# Patient Record
Sex: MALE | Race: WHITE | NOT HISPANIC OR LATINO | Employment: OTHER | URBAN - METROPOLITAN AREA
[De-identification: names, ages, dates, MRNs, and addresses within clinical notes are randomized per-mention and may not be internally consistent; named-entity substitution may affect disease eponyms.]

---

## 2017-02-21 DIAGNOSIS — Z09 SURGERY FOLLOW-UP EXAMINATION: ICD-10-CM

## 2017-02-22 RX ORDER — BACLOFEN 10 MG/1
TABLET ORAL
Qty: 270 TABLET | Refills: 1 | Status: SHIPPED | OUTPATIENT
Start: 2017-02-22 | End: 2017-12-06

## 2017-04-18 ENCOUNTER — HOSPITAL ENCOUNTER (OUTPATIENT)
Dept: GENERAL RADIOLOGY | Facility: HOSPITAL | Age: 72
Discharge: HOME | End: 2017-04-18
Attending: NEUROLOGICAL SURGERY | Admitting: NEUROLOGICAL SURGERY

## 2017-04-18 DIAGNOSIS — Z09 SURGERY FOLLOW-UP EXAMINATION: ICD-10-CM

## 2017-04-18 PROCEDURE — 72114 X-RAY EXAM L-S SPINE BENDING: CPT

## 2017-04-20 ENCOUNTER — OFFICE VISIT (OUTPATIENT)
Dept: NEUROSURGERY | Facility: CLINIC | Age: 72
End: 2017-04-20

## 2017-04-20 VITALS
HEIGHT: 72 IN | BODY MASS INDEX: 34.95 KG/M2 | WEIGHT: 258 LBS | DIASTOLIC BLOOD PRESSURE: 83 MMHG | HEART RATE: 68 BPM | SYSTOLIC BLOOD PRESSURE: 143 MMHG

## 2017-04-20 DIAGNOSIS — G89.29 CHRONIC MIDLINE LOW BACK PAIN WITHOUT SCIATICA: Primary | ICD-10-CM

## 2017-04-20 DIAGNOSIS — M54.50 CHRONIC MIDLINE LOW BACK PAIN WITHOUT SCIATICA: Primary | ICD-10-CM

## 2017-04-20 PROCEDURE — 99213 OFFICE O/P EST LOW 20 MIN: CPT | Performed by: NEUROLOGICAL SURGERY

## 2017-04-20 NOTE — PROGRESS NOTES
Subjective   Patient ID: Isaac Suarez is a 71 y.o. male is here today for follow-up. with new XR. He is almost 7 months out from a L4-5 lumbar discectomy and fusion on 9/23/16. He has back pain. It is increased when he ambulates longer distances.    History of Present Illness     This patient returns today.  He is feeling pretty good overall.  He does not have a lot of pain.  He does have some pain in his back when he walks any distance at all.    The following portions of the patient's history were reviewed and updated as appropriate: allergies, current medications, past family history, past medical history, past social history, past surgical history and problem list.    Review of Systems   Respiratory: Negative for chest tightness and shortness of breath.    Cardiovascular: Negative for chest pain.   Musculoskeletal: Positive for back pain.   All other systems reviewed and are negative.      Objective   Physical Exam   Constitutional: He is oriented to person, place, and time. He appears well-developed and well-nourished.   Neurological: He is oriented to person, place, and time.     Neurologic Exam     Mental Status   Oriented to person, place, and time.       Assessment/Plan   Independent Review of Radiographic Studies:      I reviewed his plain films done on April 18 myself.  These show good alignment of the construct at L4 5.  There appears to be good bone formation laterally over the transverse processes but not a lot of bone yet in the disc space.  There is no movement on flexion and extension.    Medical Decision Making:      I told the patient about the imaging.  I told him that from my point of view he should just stick with his exercise program.  He is to call me if any other problems develop.  Otherwise I will check him one more time in about 6 months.    Isaac was seen today for back pain.    Diagnoses and all orders for this visit:    Chronic midline low back pain without sciatica  -     XR Spine  Cervical Complete With Flex Ext; Future    Return in about 6 months (around 10/20/2017).

## 2017-09-20 ENCOUNTER — HOSPITAL ENCOUNTER (OUTPATIENT)
Dept: GENERAL RADIOLOGY | Facility: HOSPITAL | Age: 72
Discharge: HOME | End: 2017-09-20
Attending: NEUROLOGICAL SURGERY | Admitting: NEUROLOGICAL SURGERY

## 2017-09-20 ENCOUNTER — HOSPITAL ENCOUNTER (OUTPATIENT)
Dept: GENERAL RADIOLOGY | Facility: HOSPITAL | Age: 72
Discharge: HOME | End: 2017-09-20
Attending: NEUROLOGICAL SURGERY

## 2017-09-20 DIAGNOSIS — M54.50 CHRONIC MIDLINE LOW BACK PAIN WITHOUT SCIATICA: Primary | ICD-10-CM

## 2017-09-20 DIAGNOSIS — G89.29 CHRONIC MIDLINE LOW BACK PAIN WITHOUT SCIATICA: Primary | ICD-10-CM

## 2017-09-20 DIAGNOSIS — G89.29 CHRONIC MIDLINE LOW BACK PAIN WITHOUT SCIATICA: ICD-10-CM

## 2017-09-20 DIAGNOSIS — M54.50 CHRONIC MIDLINE LOW BACK PAIN WITHOUT SCIATICA: ICD-10-CM

## 2017-09-20 PROCEDURE — 72114 X-RAY EXAM L-S SPINE BENDING: CPT

## 2017-09-28 ENCOUNTER — OFFICE VISIT (OUTPATIENT)
Dept: NEUROSURGERY | Facility: CLINIC | Age: 72
End: 2017-09-28

## 2017-09-28 VITALS
BODY MASS INDEX: 36.57 KG/M2 | WEIGHT: 270 LBS | SYSTOLIC BLOOD PRESSURE: 116 MMHG | HEART RATE: 66 BPM | HEIGHT: 72 IN | DIASTOLIC BLOOD PRESSURE: 77 MMHG

## 2017-09-28 DIAGNOSIS — M54.50 CHRONIC MIDLINE LOW BACK PAIN WITHOUT SCIATICA: Primary | ICD-10-CM

## 2017-09-28 DIAGNOSIS — G89.29 CHRONIC MIDLINE LOW BACK PAIN WITHOUT SCIATICA: Primary | ICD-10-CM

## 2017-09-28 PROCEDURE — 99213 OFFICE O/P EST LOW 20 MIN: CPT | Performed by: NEUROLOGICAL SURGERY

## 2017-09-28 RX ORDER — ALPRAZOLAM 0.5 MG/1
TABLET ORAL
Qty: 3 TABLET | Refills: 0 | OUTPATIENT
Start: 2017-09-28 | End: 2017-11-21

## 2017-09-28 NOTE — PROGRESS NOTES
Subjective   Patient ID: Isaac Suarez is a 72 y.o. male is here today for follow-up with new XR. He is a year out from a L4-5 lumbar discectomy and fusion on 9/23/16. He had increased back pain with long distance walks. He also has bilateral hip pain that will wake him up while he is sleeping.    History of Present Illness    This patient is doing well overall.  He doesn't have any specific complaints other than if he has to walk more than about 50 yards he has to take a cane because he gets pain in his back.  He has pain in his hips it wakes him up at night and he has seen an orthopedic surgeon who says his hips are okay.    The following portions of the patient's history were reviewed and updated as appropriate: allergies, current medications, past family history, past medical history, past social history, past surgical history and problem list.    Review of Systems   Respiratory: Negative for chest tightness and shortness of breath.    Cardiovascular: Negative for chest pain.   Musculoskeletal: Positive for arthralgias (Bilateral hip pain) and back pain.   Neurological: Positive for numbness (Left big toe).   Psychiatric/Behavioral: Positive for sleep disturbance.   All other systems reviewed and are negative.      Objective   Physical Exam   Constitutional: He is oriented to person, place, and time. He appears well-developed and well-nourished.   Neurological: He is oriented to person, place, and time.     Neurologic Exam     Mental Status   Oriented to person, place, and time.       Assessment/Plan   Independent Review of Radiographic Studies:      I reviewed x-rays done of his lumbar spine last week.  These look okay with good bone formation laterally and also what appears to be good bone formation in the disc space.    Medical Decision Making:      I told the patient that I think a lot of the trouble he has walking is simply a result of damage done to the nerves prior to decompressing them.  Nonetheless I  think we should probably check a new MRI just to be sure that there isn't something else going on.  I will see him back once that has been done.    Isaac was seen today for back pain and hip pain.    Diagnoses and all orders for this visit:    Chronic midline low back pain without sciatica  -     MRI Lumbar Spine With & Without Contrast; Future      Return for After radiology test.

## 2017-09-28 NOTE — PATIENT INSTRUCTIONS

## 2017-10-03 ENCOUNTER — HOSPITAL ENCOUNTER (OUTPATIENT)
Dept: MRI IMAGING | Facility: HOSPITAL | Age: 72
Discharge: HOME | End: 2017-10-03
Attending: NEUROLOGICAL SURGERY

## 2017-10-09 ENCOUNTER — HOSPITAL ENCOUNTER (OUTPATIENT)
Dept: MRI IMAGING | Facility: HOSPITAL | Age: 72
Discharge: HOME | End: 2017-10-09
Attending: NEUROLOGICAL SURGERY | Admitting: NEUROLOGICAL SURGERY

## 2017-10-09 DIAGNOSIS — M54.50 CHRONIC MIDLINE LOW BACK PAIN WITHOUT SCIATICA: ICD-10-CM

## 2017-10-09 DIAGNOSIS — G89.29 CHRONIC MIDLINE LOW BACK PAIN WITHOUT SCIATICA: ICD-10-CM

## 2017-10-09 LAB — CREAT BLDA-MCNC: 1.4 MG/DL (ref 0.6–1.3)

## 2017-10-09 PROCEDURE — 72158 MRI LUMBAR SPINE W/O & W/DYE: CPT

## 2017-10-09 PROCEDURE — 82565 ASSAY OF CREATININE: CPT

## 2017-10-09 PROCEDURE — A9577 INJ MULTIHANCE: HCPCS | Performed by: NEUROLOGICAL SURGERY

## 2017-10-09 PROCEDURE — 0 GADOBENATE DIMEGLUMINE 529 MG/ML SOLUTION: Performed by: NEUROLOGICAL SURGERY

## 2017-10-09 RX ADMIN — GADOBENATE DIMEGLUMINE 20 ML: 529 INJECTION, SOLUTION INTRAVENOUS at 09:45

## 2017-11-21 ENCOUNTER — OFFICE VISIT (OUTPATIENT)
Dept: NEUROSURGERY | Facility: CLINIC | Age: 72
End: 2017-11-21

## 2017-11-21 VITALS — HEART RATE: 72 BPM | DIASTOLIC BLOOD PRESSURE: 77 MMHG | SYSTOLIC BLOOD PRESSURE: 129 MMHG

## 2017-11-21 DIAGNOSIS — M46.1 BILATERAL SACROILIITIS (HCC): Primary | ICD-10-CM

## 2017-11-21 PROCEDURE — 99213 OFFICE O/P EST LOW 20 MIN: CPT | Performed by: NEUROLOGICAL SURGERY

## 2017-11-21 NOTE — PROGRESS NOTES
Subjective   Patient ID: Isaac Suarez is a 72 y.o. male is here today for follow-up with a new Lumbar MRI ordered for increased back pain that radiates into his bilateral hips.    History of Present Illness    This patient continues with pain primarily in his hips.  He said it is particularly bad at night when he is lying on his hips.  After a couple of hours he has to turned over to the other hip.    The following portions of the patient's history were reviewed and updated as appropriate: allergies, current medications, past family history, past medical history, past social history, past surgical history and problem list.    Review of Systems   Respiratory: Negative for chest tightness and shortness of breath.    Cardiovascular: Negative for chest pain.   Musculoskeletal: Positive for arthralgias ( bilateral hip pain) and back pain.   Neurological: Positive for numbness.   All other systems reviewed and are negative.      Objective   Physical Exam   Constitutional: He is oriented to person, place, and time. He appears well-developed and well-nourished.   Neurological: He is oriented to person, place, and time.     Neurologic Exam     Mental Status   Oriented to person, place, and time.       Assessment/Plan   Independent Review of Radiographic Studies:      I again reviewed his plain films which were done on the 20th subsequent time her.  This shows good bone formation laterally at L4 5.  I reviewed his MRI which was done on 9 October.  This looks okay at T12-L1 and L1 2.  L2-3 is also fairly open.  L3 4 is a little bit downward but not much.  L4 5 is totally open and L5-S1 shows just some degenerative change.    Medical Decision Making:      I told the patient some of the pain could be coming from his sacroiliac joint.  I suggested that we go ahead and get someone to do an injection in that area. I will have him call me and let me know how they work once they have been done.  I also told him he has to lose  olayinka Gray was seen today for back pain.    Diagnoses and all orders for this visit:    Bilateral sacroiliitis  -     SI Joint Injection    Return for Recheck and call after treatment or consultation.

## 2017-12-06 ENCOUNTER — ANESTHESIA (OUTPATIENT)
Dept: PAIN MEDICINE | Facility: HOSPITAL | Age: 72
End: 2017-12-06

## 2017-12-06 ENCOUNTER — HOSPITAL ENCOUNTER (OUTPATIENT)
Dept: GENERAL RADIOLOGY | Facility: HOSPITAL | Age: 72
Discharge: HOME | End: 2017-12-06

## 2017-12-06 ENCOUNTER — HOSPITAL ENCOUNTER (OUTPATIENT)
Dept: PAIN MEDICINE | Facility: HOSPITAL | Age: 72
Discharge: HOME | End: 2017-12-06
Attending: NEUROLOGICAL SURGERY | Admitting: NEUROLOGICAL SURGERY

## 2017-12-06 ENCOUNTER — ANESTHESIA EVENT (OUTPATIENT)
Dept: PAIN MEDICINE | Facility: HOSPITAL | Age: 72
End: 2017-12-06

## 2017-12-06 VITALS
SYSTOLIC BLOOD PRESSURE: 140 MMHG | RESPIRATION RATE: 16 BRPM | WEIGHT: 275 LBS | TEMPERATURE: 98.1 F | OXYGEN SATURATION: 96 % | HEIGHT: 72 IN | BODY MASS INDEX: 37.25 KG/M2 | DIASTOLIC BLOOD PRESSURE: 87 MMHG | HEART RATE: 74 BPM

## 2017-12-06 DIAGNOSIS — R52 PAIN: ICD-10-CM

## 2017-12-06 DIAGNOSIS — M46.1 BILATERAL SACROILIITIS (HCC): ICD-10-CM

## 2017-12-06 PROCEDURE — 25010000002 MIDAZOLAM PER 1 MG: Performed by: ANESTHESIOLOGY

## 2017-12-06 PROCEDURE — 25010000002 FENTANYL CITRATE (PF) 100 MCG/2ML SOLUTION: Performed by: ANESTHESIOLOGY

## 2017-12-06 PROCEDURE — 25010000002 METHYLPREDNISOLONE PER 80 MG: Performed by: ANESTHESIOLOGY

## 2017-12-06 PROCEDURE — C1755 CATHETER, INTRASPINAL: HCPCS

## 2017-12-06 RX ORDER — SODIUM CHLORIDE 0.9 % (FLUSH) 0.9 %
1-10 SYRINGE (ML) INJECTION AS NEEDED
Status: DISCONTINUED | OUTPATIENT
Start: 2017-12-06 | End: 2017-12-07 | Stop reason: HOSPADM

## 2017-12-06 RX ORDER — LIDOCAINE HYDROCHLORIDE 10 MG/ML
0.5 INJECTION, SOLUTION INFILTRATION; PERINEURAL ONCE AS NEEDED
Status: CANCELLED | OUTPATIENT
Start: 2017-12-06

## 2017-12-06 RX ORDER — FENTANYL CITRATE 50 UG/ML
50 INJECTION, SOLUTION INTRAMUSCULAR; INTRAVENOUS AS NEEDED
Status: DISCONTINUED | OUTPATIENT
Start: 2017-12-06 | End: 2017-12-07 | Stop reason: HOSPADM

## 2017-12-06 RX ORDER — METHYLPREDNISOLONE ACETATE 80 MG/ML
80 INJECTION, SUSPENSION INTRA-ARTICULAR; INTRALESIONAL; INTRAMUSCULAR; SOFT TISSUE ONCE
Status: COMPLETED | OUTPATIENT
Start: 2017-12-06 | End: 2017-12-06

## 2017-12-06 RX ORDER — MIDAZOLAM HYDROCHLORIDE 1 MG/ML
1 INJECTION INTRAMUSCULAR; INTRAVENOUS AS NEEDED
Status: DISCONTINUED | OUTPATIENT
Start: 2017-12-06 | End: 2017-12-07 | Stop reason: HOSPADM

## 2017-12-06 RX ORDER — LIDOCAINE HYDROCHLORIDE 10 MG/ML
1 INJECTION, SOLUTION INFILTRATION; PERINEURAL ONCE AS NEEDED
Status: DISCONTINUED | OUTPATIENT
Start: 2017-12-06 | End: 2017-12-07 | Stop reason: HOSPADM

## 2017-12-06 RX ORDER — BUPIVACAINE HYDROCHLORIDE 5 MG/ML
10 INJECTION, SOLUTION EPIDURAL; INTRACAUDAL ONCE
Status: COMPLETED | OUTPATIENT
Start: 2017-12-06 | End: 2017-12-06

## 2017-12-06 RX ADMIN — Medication 1 MG: at 13:35

## 2017-12-06 RX ADMIN — FENTANYL CITRATE 100 MCG: 50 INJECTION INTRAMUSCULAR; INTRAVENOUS at 13:36

## 2017-12-06 RX ADMIN — METHYLPREDNISOLONE ACETATE 80 MG: 80 INJECTION, SUSPENSION INTRA-ARTICULAR; INTRALESIONAL; INTRAMUSCULAR; SOFT TISSUE at 13:43

## 2017-12-06 RX ADMIN — BUPIVACAINE HYDROCHLORIDE 10 ML: 5 INJECTION, SOLUTION EPIDURAL; INTRACAUDAL; PERINEURAL at 13:42

## 2017-12-06 NOTE — ANESTHESIA PROCEDURE NOTES
PAIN SI joint injection    Patient location during procedure: Pain Clinic  Start time: 12/6/2017 1:35 PM  Stop time: 12/6/2017 1:48 PM    Reason for block: procedure for pain  Performed by  Anesthesiologist: RAGHAV STEPHEN  Preanesthetic Checklist  Completed: patient identified, site marked, surgical consent, pre-op evaluation, timeout performed, IV checked, risks and benefits discussed and monitors and equipment checked  Prep:  Patient position: supine  Sterile barriers:mask, gloves and cap  Prep: ChloraPrep  Patient monitoring: blood pressure monitoring, continuous pulse oximetry and EKG  Procedure:  Sedation:yes  Guidance:fluoroscopy  Contrast Medium:no  Location:Bilateral  Needle Gauge:22 G  Medications:  Analgesia: 40 mg each side.  Comment:4 cc 0.5% bupivacaine each side    Post Assessment  Patient Tolerance:comfortable throughout block  Complications:no

## 2017-12-06 NOTE — H&P
[facility]    History and Physical    Patient Name: Isaac Suarez  :  1945  MRN:  [id number]  Date of Admission: 2017    Subjective     Patient is a 72 y.o. male presents with chief complaint of chronic, moderate, severe buttock pain.  Onset of symptoms was gradual starting several years ago.  Symptoms are associated/aggravated by activity or sleeping on side. Symptoms improve with TENS unit and OTC meds. He had a lumbar decompression one year ago and repeat workup didn't show any significant problem. Dr Frias suggested that it may be sacroiliitis and requested SI jt injections.    The following portions of the patients history were reviewed and updated as appropriate: current medications, allergies, past medical history, past surgical history, past family history, past social history and problem list                Objective     Past Medical History:   Past Medical History:   Diagnosis Date   • Arthritis    • Cancer     PROSTATE   • Chronic back pain     when walking and lying down   • History of prostate cancer     RADIATION TX   • Hyperlipidemia    • Hypertension    • Low back pain    • Lumbar stenosis      Past Surgical History:   Past Surgical History:   Procedure Laterality Date   • CATARACT EXTRACTION Right    • COLONOSCOPY N/A 8/15/2016    Procedure: COLONOSCOPY INTO CECUM WITH COLD SNARE POLYPECTOMY;  Surgeon: Loc Lee MD;  Location: [facility] ENDOSCOPY;  Service:    • HERNIA REPAIR      bilat inguinal   • LUMBAR DISCECTOMY FUSION INSTRUMENTATION N/A 2016    Procedure: L 4-5 LUMBAR DISCECTOMY FUSION WITH INSTRUMENTATION;  Surgeon: Michael Frias MD;  Location: [facility] OR;  Service:    • ROTATOR CUFF REPAIR Bilateral     metal in both   • TOTAL SHOULDER ARTHROPLASTY Bilateral      Family History:   Family History   Problem Relation Age of Onset   • Cancer Father      BRAIN     Social History:   Social History   Substance Use Topics   • Smoking status: Former Smoker      Packs/day: 2.00     Years: 12.00     Types: Cigarettes     Quit date: 1976   • Smokeless tobacco: Never Used   • Alcohol use No      Comment: SOCIALLY       Vital Signs Range for the last 24 hours  Temperature:     Temp Source:     BP:     Pulse:     Respirations:     SPO2:     O2 Amount (l/min):     O2 Devices     Weight:           --------------------------------------------------------------------------------    Current Outpatient Prescriptions   Medication Sig Dispense Refill   • furosemide (LASIX) 20 MG tablet Take 20 mg by mouth every morning.     • lisinopril (PRINIVIL,ZESTRIL) 10 MG tablet Take 10 mg by mouth every morning.     • aspirin 81 MG EC tablet Take 81 mg by mouth every morning. STOPPED 9/12/16     • bisacodyl (DULCOLAX) 10 MG suppository Insert 1 suppository into the rectum daily. 1 suppository 0     Current Facility-Administered Medications   Medication Dose Route Frequency Provider Last Rate Last Dose   • sodium chloride 0.9 % flush 1-10 mL  1-10 mL Intravenous PRN Oz Gomez MD           --------------------------------------------------------------------------------  Assessment/Plan      Anesthesia Evaluation            Airway   Mallampati: II  Dental - normal exam     Pulmonary     breath sounds clear to auscultation  Cardiovascular     Rate: normal        Neuro/Psych- neuro exam normal  GI/Hepatic/Renal/Endo      Musculoskeletal     (-) straight leg test  Abdominal    Substance History      OB/GYN          Other                                   Diagnosis and Plan    Treatment Plan  ASA 3      Procedures: Nerve block type: Sacro iliac Joint injections., With fluoroscopy,       Anesthetic plan and risks discussed with patient.          Diagnosis     * Bilateral sacroiliitis [M46.1]

## 2017-12-07 ENCOUNTER — TELEPHONE (OUTPATIENT)
Dept: NEUROSURGERY | Facility: CLINIC | Age: 72
End: 2017-12-07

## 2017-12-07 NOTE — TELEPHONE ENCOUNTER
Patient is aware of what Dr. Frias said. He will call us if he needs anything further.      ----- Message from Michael Frias MD sent at 12/7/2017  5:29 PM EST -----  Regarding: RE: SI INJECTION  That is great.  That means that is the source of the problem.  If the pain returns he should follow-up with pain management for further injections.  Please let him know.  ----- Message -----     From: Nika Giles MA     Sent: 12/7/2017   3:52 PM       To: Michael Frias MD  Subject: SI INJECTION                                     Patient called to report that he had his first SI injection yesterday and he has received a significant amount of relief. He stated that you wanted to know how the first one went.

## 2017-12-14 ENCOUNTER — TRANSCRIBE ORDERS (OUTPATIENT)
Dept: PAIN MEDICINE | Facility: HOSPITAL | Age: 72
End: 2017-12-14

## 2017-12-14 DIAGNOSIS — M46.1 BILATERAL SACROILIITIS (HCC): Primary | ICD-10-CM

## 2018-01-03 ENCOUNTER — HOSPITAL ENCOUNTER (OUTPATIENT)
Dept: PAIN MEDICINE | Facility: HOSPITAL | Age: 73
Discharge: HOME | End: 2018-01-03
Admitting: NEUROLOGICAL SURGERY

## 2018-01-03 ENCOUNTER — HOSPITAL ENCOUNTER (OUTPATIENT)
Dept: GENERAL RADIOLOGY | Facility: HOSPITAL | Age: 73
Discharge: HOME | End: 2018-01-03

## 2018-01-03 ENCOUNTER — ANESTHESIA EVENT (OUTPATIENT)
Dept: PAIN MEDICINE | Facility: HOSPITAL | Age: 73
End: 2018-01-03

## 2018-01-03 ENCOUNTER — ANESTHESIA (OUTPATIENT)
Dept: PAIN MEDICINE | Facility: HOSPITAL | Age: 73
End: 2018-01-03

## 2018-01-03 VITALS
HEART RATE: 74 BPM | OXYGEN SATURATION: 96 % | RESPIRATION RATE: 16 BRPM | BODY MASS INDEX: 37.24 KG/M2 | WEIGHT: 274.91 LBS | DIASTOLIC BLOOD PRESSURE: 76 MMHG | SYSTOLIC BLOOD PRESSURE: 128 MMHG | HEIGHT: 72 IN

## 2018-01-03 DIAGNOSIS — M46.1 BILATERAL SACROILIITIS (HCC): ICD-10-CM

## 2018-01-03 DIAGNOSIS — R52 PAIN: ICD-10-CM

## 2018-01-03 PROCEDURE — C1755 CATHETER, INTRASPINAL: HCPCS

## 2018-01-03 PROCEDURE — 25010000002 METHYLPREDNISOLONE PER 80 MG: Performed by: ANESTHESIOLOGY

## 2018-01-03 RX ORDER — LIDOCAINE HYDROCHLORIDE 10 MG/ML
1 INJECTION, SOLUTION INFILTRATION; PERINEURAL ONCE AS NEEDED
Status: DISCONTINUED | OUTPATIENT
Start: 2018-01-03 | End: 2018-01-04 | Stop reason: HOSPADM

## 2018-01-03 RX ORDER — FENTANYL CITRATE 50 UG/ML
50 INJECTION, SOLUTION INTRAMUSCULAR; INTRAVENOUS AS NEEDED
Status: DISCONTINUED | OUTPATIENT
Start: 2018-01-03 | End: 2018-01-04 | Stop reason: HOSPADM

## 2018-01-03 RX ORDER — SODIUM CHLORIDE 0.9 % (FLUSH) 0.9 %
1-10 SYRINGE (ML) INJECTION AS NEEDED
Status: DISCONTINUED | OUTPATIENT
Start: 2018-01-03 | End: 2018-01-04 | Stop reason: HOSPADM

## 2018-01-03 RX ORDER — METHYLPREDNISOLONE ACETATE 80 MG/ML
80 INJECTION, SUSPENSION INTRA-ARTICULAR; INTRALESIONAL; INTRAMUSCULAR; SOFT TISSUE ONCE
Status: COMPLETED | OUTPATIENT
Start: 2018-01-03 | End: 2018-01-03

## 2018-01-03 RX ORDER — BUPIVACAINE HYDROCHLORIDE 2.5 MG/ML
10 INJECTION, SOLUTION EPIDURAL; INFILTRATION; INTRACAUDAL ONCE
Status: COMPLETED | OUTPATIENT
Start: 2018-01-03 | End: 2018-01-03

## 2018-01-03 RX ORDER — MIDAZOLAM HYDROCHLORIDE 1 MG/ML
1 INJECTION INTRAMUSCULAR; INTRAVENOUS AS NEEDED
Status: DISCONTINUED | OUTPATIENT
Start: 2018-01-03 | End: 2018-01-04 | Stop reason: HOSPADM

## 2018-01-03 RX ADMIN — METHYLPREDNISOLONE ACETATE 80 MG: 80 INJECTION, SUSPENSION INTRA-ARTICULAR; INTRALESIONAL; INTRAMUSCULAR; SOFT TISSUE at 10:54

## 2018-01-03 RX ADMIN — BUPIVACAINE HYDROCHLORIDE 10 ML: 2.5 INJECTION, SOLUTION EPIDURAL; INFILTRATION; INTRACAUDAL at 10:54

## 2018-01-03 NOTE — H&P (VIEW-ONLY)
Psychiatric    History and Physical    Patient Name: Isaac Suarez  :  1945  MRN:  [id number]  Date of Admission: 2017    Subjective     Patient is a 72 y.o. male presents with chief complaint of chronic, moderate, severe buttock pain.  Onset of symptoms was gradual starting several years ago.  Symptoms are associated/aggravated by activity or sleeping on side. Symptoms improve with TENS unit and OTC meds. He had a lumbar decompression one year ago and repeat workup didn't show any significant problem. Dr Frias suggested that it may be sacroiliitis and requested SI jt injections.    The following portions of the patients history were reviewed and updated as appropriate: current medications, allergies, past medical history, past surgical history, past family history, past social history and problem list                Objective     Past Medical History:   Past Medical History:   Diagnosis Date   • Arthritis    • Cancer     PROSTATE   • Chronic back pain     when walking and lying down   • History of prostate cancer     RADIATION TX   • Hyperlipidemia    • Hypertension    • Low back pain    • Lumbar stenosis      Past Surgical History:   Past Surgical History:   Procedure Laterality Date   • CATARACT EXTRACTION Right    • COLONOSCOPY N/A 8/15/2016    Procedure: COLONOSCOPY INTO CECUM WITH COLD SNARE POLYPECTOMY;  Surgeon: Loc Lee MD;  Location: [facility] ENDOSCOPY;  Service:    • HERNIA REPAIR      bilat inguinal   • LUMBAR DISCECTOMY FUSION INSTRUMENTATION N/A 2016    Procedure: L 4-5 LUMBAR DISCECTOMY FUSION WITH INSTRUMENTATION;  Surgeon: Michael Frias MD;  Location: [facility] OR;  Service:    • ROTATOR CUFF REPAIR Bilateral     metal in both   • TOTAL SHOULDER ARTHROPLASTY Bilateral      Family History:   Family History   Problem Relation Age of Onset   • Cancer Father      BRAIN     Social History:   Social History   Substance Use Topics   • Smoking status: Former Smoker      Packs/day: 2.00     Years: 12.00     Types: Cigarettes     Quit date: 1976   • Smokeless tobacco: Never Used   • Alcohol use No      Comment: SOCIALLY       Vital Signs Range for the last 24 hours  Temperature:     Temp Source:     BP:     Pulse:     Respirations:     SPO2:     O2 Amount (l/min):     O2 Devices     Weight:           --------------------------------------------------------------------------------    Current Outpatient Prescriptions   Medication Sig Dispense Refill   • furosemide (LASIX) 20 MG tablet Take 20 mg by mouth every morning.     • lisinopril (PRINIVIL,ZESTRIL) 10 MG tablet Take 10 mg by mouth every morning.     • aspirin 81 MG EC tablet Take 81 mg by mouth every morning. STOPPED 9/12/16     • bisacodyl (DULCOLAX) 10 MG suppository Insert 1 suppository into the rectum daily. 1 suppository 0     Current Facility-Administered Medications   Medication Dose Route Frequency Provider Last Rate Last Dose   • sodium chloride 0.9 % flush 1-10 mL  1-10 mL Intravenous PRN Oz Gomez MD           --------------------------------------------------------------------------------  Assessment/Plan      Anesthesia Evaluation            Airway   Mallampati: II  Dental - normal exam     Pulmonary     breath sounds clear to auscultation  Cardiovascular     Rate: normal        Neuro/Psych- neuro exam normal  GI/Hepatic/Renal/Endo      Musculoskeletal     (-) straight leg test  Abdominal    Substance History      OB/GYN          Other                                   Diagnosis and Plan    Treatment Plan  ASA 3      Procedures: Nerve block type: Sacro iliac Joint injections., With fluoroscopy,       Anesthetic plan and risks discussed with patient.          Diagnosis     * Bilateral sacroiliitis [M46.1]

## 2018-01-03 NOTE — ANESTHESIA PROCEDURE NOTES
PAIN SI joint injection    Patient location during procedure: Pain Clinic  Start time: 1/3/2018 10:44 AM  Stop time: 1/3/2018 10:58 AM    Reason for block: procedure for pain  Performed by  Anesthesiologist: BILLY ORELLANA  Preanesthetic Checklist  Completed: patient identified, site marked, surgical consent, pre-op evaluation, timeout performed, IV checked, risks and benefits discussed and monitors and equipment checked  Prep:  Patient position: supine  Sterile barriers:mask, gloves and cap  Prep: ChloraPrep  Patient monitoring: blood pressure monitoring, continuous pulse oximetry and EKG  Procedure:  Sedation:no  Guidance:fluoroscopy  Contrast Medium:no  Location:Bilateral  Needle Type:Tuohy  Needle gauge: 20.  Aspiration:negative  Medications:  Depomedrol:40 mg (in each joint space)  Comment:Bupiv 0.25% 3cc in each joint space    Post Assessment  Injection Assessment: negative  Patient Tolerance:comfortable throughout block  Complications:no

## 2018-01-03 NOTE — DISCHARGE INSTRUCTIONS
Lumbar Epidural Steroid Injection Instructions  Plan includes:  1.  Sacro Iliac Joint  steroid injections, up to 3, spaced 1-2 weeks apart.  If pain control is acceptable after 1 or 2 injections, it was discussed with the patient that they may return for the subsequent injections if and when their pain returns.  The risks were discussed with the patient including failure of relief, worsening pain, Headache (post dural puncture headache), bleeding (epidural hematoma) and infection (epidural abscess or skin infection).  2.  Physical therapy exercises at home as prescribed by physical therapy or from the pain clinic handout (given to the patient).  Continuation of these exercises every day, or multiple times per week, even when the patient has good pain relief, was stressed to the patient as a preventative measure to decrease the frequency and severity of future pain episodes.  3.  Continue pain medicines as already prescribed.  If patient not currently taking any, it is recommended to begin Acetaminophen 1000 mg po q 8 hours.  If other medicines containing Acetaminophen are currently prescribed, maintain daily dose at 3000 mg.    4.  If they can tolerate NSAIDS, it is recommended to take Ibuprofen 600 mg po q 6 hours for 7 days during pain exacerbations.  Alternatively, they may substitute an NSAID of their choice (e.g. Aleve).  This may be taken at the same time as Acetaminophen.  5.  Heat and ice to the affected area as tolerated for pain control.  It was discussed that heating pads can cause burns.  6.  Daily low impact exercise such as walking or water exercise was recommended to maintain overall health and aid in weight control.   7.  Follow up as needed for subsequent injections.  8.  Patient was counseled to abstain from tobacco products.

## 2018-01-03 NOTE — INTERVAL H&P NOTE
[facility]  H&P reviewed. No changes since last visit.  Patient states   % improvement for over a week after the first procedure/injection.  The pain has since recurred but is not as bad as the first time    Diagnosis     * Bilateral sacroiliitis [M46.1]      Airway assessed since last visit. Airway class equals: 2.

## 2019-07-09 ENCOUNTER — TELEPHONE (OUTPATIENT)
Dept: FAMILY MEDICINE CLINIC | Facility: CLINIC | Age: 74
End: 2019-07-09

## 2019-07-09 DIAGNOSIS — R60.9 EDEMA, UNSPECIFIED TYPE: ICD-10-CM

## 2019-07-09 DIAGNOSIS — I10 ESSENTIAL HYPERTENSION: Primary | ICD-10-CM

## 2019-07-09 RX ORDER — FUROSEMIDE 20 MG/1
20 TABLET ORAL EVERY MORNING
Qty: 30 TABLET | Refills: 3 | Status: SHIPPED | OUTPATIENT
Start: 2019-07-09 | End: 2019-08-20 | Stop reason: SDUPTHER

## 2019-07-09 RX ORDER — LISINOPRIL 10 MG/1
10 TABLET ORAL EVERY MORNING
Qty: 30 TABLET | Refills: 2 | Status: SHIPPED | OUTPATIENT
Start: 2019-07-09 | End: 2019-08-20 | Stop reason: SDUPTHER

## 2019-07-09 NOTE — TELEPHONE ENCOUNTER
PATIENT IS UPSET & WANTS A CALL BACK FROM [facility] BEFORE THE END OF THE DAY AS WELL AS HIS LISINOPRIL 10MG & FUROSEMIDE 20MG CALLED INTO [facility] [phone]. HE STATES THAT HE HAS BEEN WITHOUT HIS MEDICATION FOR 6 DAYS & THAT THE PHARM HAS HAD TROUBLE FINDING US.

## 2019-08-20 ENCOUNTER — OFFICE VISIT (OUTPATIENT)
Dept: FAMILY MEDICINE CLINIC | Facility: CLINIC | Age: 74
End: 2019-08-20

## 2019-08-20 VITALS
BODY MASS INDEX: 31.77 KG/M2 | HEART RATE: 77 BPM | SYSTOLIC BLOOD PRESSURE: 118 MMHG | TEMPERATURE: 98.5 F | WEIGHT: 234.6 LBS | DIASTOLIC BLOOD PRESSURE: 74 MMHG | OXYGEN SATURATION: 97 % | HEIGHT: 72 IN

## 2019-08-20 DIAGNOSIS — R74.01 ELEVATED TRANSAMINASE LEVEL: ICD-10-CM

## 2019-08-20 DIAGNOSIS — I10 ESSENTIAL HYPERTENSION: ICD-10-CM

## 2019-08-20 DIAGNOSIS — R25.2 LEG CRAMPS: Primary | ICD-10-CM

## 2019-08-20 DIAGNOSIS — E78.5 HYPERLIPIDEMIA, UNSPECIFIED HYPERLIPIDEMIA TYPE: ICD-10-CM

## 2019-08-20 DIAGNOSIS — R60.9 EDEMA, UNSPECIFIED TYPE: ICD-10-CM

## 2019-08-20 PROCEDURE — 99214 OFFICE O/P EST MOD 30 MIN: CPT | Performed by: INTERNAL MEDICINE

## 2019-08-20 RX ORDER — FUROSEMIDE 20 MG/1
20 TABLET ORAL EVERY MORNING
Qty: 90 TABLET | Refills: 1 | Status: SHIPPED | OUTPATIENT
Start: 2019-08-20 | End: 2019-11-26 | Stop reason: SDUPTHER

## 2019-08-20 RX ORDER — LISINOPRIL 10 MG/1
10 TABLET ORAL EVERY MORNING
Qty: 90 TABLET | Refills: 1 | Status: SHIPPED | OUTPATIENT
Start: 2019-08-20 | End: 2020-04-29

## 2019-08-20 NOTE — PROGRESS NOTES
Subjective   Isaac Suarez is a 73 y.o. male.     Chief Complaint   Patient presents with   • Muscle Pain     both feet   • Med Refill   • Hyperlipidemia   • Hypertension       History of Present Illness   Follow-up for cholesterol.  Currently has been feeling well without any myalgias, muscle aches, weakness, numbness, chest pain, short of breath or other issues.  Currently is adherent with medication regimen and denies medication side effects. Is due for lab follow-up.  Follow-up for hypertension.  Currently has been feeling well and asymptomatic without any headaches,vision changes, cough, chest pain, shortness of breath, swelling, focal neurologic deficit, memory loss or syncope.  Has been taking the medications regularly and adherent with the regimen, Denies medication side effects and no significant interval events.  Home BP has been doing good but cannot remember the numbers.  Has been following a diet and loosing weight with his wife.  Patient has been having cramps and pain in the feet/ ankles and distal legs mainly at night in bed but sometimes during the day.  Has been worsening over the last several months.  Tried pickle juice, OTC creams.      The following portions of the patient's history were reviewed and updated as appropriate: allergies, current medications, past family history, past medical history, past social history, past surgical history and problem list.    Past Medical History:   Diagnosis Date   • Abnormal MRI, lumbar spine    • Allergic rhinitis    • Arthritis    • Bilateral hearing loss    • Borderline blood pressure    • BPH (benign prostatic hyperplasia)    • Cancer (CMS/HCC)     PROSTATE   • Chronic back pain     when walking and lying down   • Chronic kidney disease (CKD), stage I    • Cough    • Edema    • Elevated transaminase level    • Erectile dysfunction    • External hemorrhoids    • GERD (gastroesophageal reflux disease)    • High cholesterol    • History of colon polyps    •  History of depression    • History of prostate cancer     RADIATION [place]   • Hyperlipidemia    • Hypertension    • Leg cramps    • Low back pain    • Lumbar stenosis    • Muscle spasm    • Prostate cancer (CMS/HCC)    • Renal cyst    • Statin intolerance    • Vitamin D deficiency        Past Surgical History:   Procedure Laterality Date   • CATARACT EXTRACTION Right    • COLONOSCOPY N/A 8/15/2016    Procedure: COLONOSCOPY INTO CECUM WITH COLD SNARE POLYPECTOMY;  Surgeon: Loc Lee MD;  Location: [facility] ENDOSCOPY;  Service:    • HERNIA REPAIR      bilat inguinal   • INSERTION PROSTATE RADIATION SEED     • LUMBAR DISCECTOMY FUSION INSTRUMENTATION N/A 2016    Procedure: L 4-5 LUMBAR DISCECTOMY FUSION WITH INSTRUMENTATION;  Surgeon: Michael Frias MD;  Location: [facility] MAIN OR;  Service:    • PROSTATE BIOPSY      Needle biopsy   • ROTATOR CUFF REPAIR Bilateral     metal in both   • TOTAL SHOULDER ARTHROPLASTY Bilateral    • VASECTOMY         Family History   Problem Relation Age of Onset   • Cancer Father         BRAIN   • Esophageal cancer Other        Social History     Socioeconomic History   • Marital status:      Spouse name: Not on file   • Number of children: Not on file   • Years of education: 12TH GRADE   • Highest education level: Not on file   Occupational History   • Occupation: RETIRED   Tobacco Use   • Smoking status: Former Smoker     Packs/day: 2.00     Years: 12.00     Pack years: 24.00     Types: Cigarettes     Last attempt to quit:      Years since quittin.6   • Smokeless tobacco: Never Used   Substance and Sexual Activity   • Alcohol use: Yes     Comment: SOCIALLY-Drinks 7 or less drinks per week   • Drug use: No   • Sexual activity: Defer       Review of Systems   Constitutional: Negative for activity change, appetite change, fatigue, fever, unexpected weight gain and unexpected weight loss.   HENT: Negative for nosebleeds, rhinorrhea, trouble swallowing and voice  change.    Eyes: Negative for visual disturbance.   Respiratory: Negative for cough, chest tightness, shortness of breath and wheezing.    Cardiovascular: Negative for chest pain, palpitations and leg swelling.   Gastrointestinal: Negative for abdominal pain, blood in stool, constipation, diarrhea, nausea, vomiting, GERD and indigestion.   Genitourinary: Negative for dysuria, frequency and hematuria.   Musculoskeletal: Negative for arthralgias, back pain and myalgias.        Cramps in distal LE   Skin: Negative for rash and bruise.   Neurological: Negative for dizziness, tremors, weakness, light-headedness, numbness, headache and memory problem.   Hematological: Negative for adenopathy. Does not bruise/bleed easily.   Psychiatric/Behavioral: Negative for sleep disturbance and depressed mood. The patient is not nervous/anxious.        Objective   Vitals:    08/20/19 1716   BP: 118/74   Pulse: 77   Temp: 98.5 °F (36.9 °C)   SpO2: 97%     Body mass index is 31.82 kg/m².  Physical Exam   Constitutional: He is oriented to person, place, and time. He appears well-developed and well-nourished. No distress.   HENT:   Head: Normocephalic and atraumatic.   Right Ear: External ear normal.   Left Ear: External ear normal.   Nose: Nose normal.   Mouth/Throat: Oropharynx is clear and moist.   Eyes: Conjunctivae and EOM are normal. Pupils are equal, round, and reactive to light.   Neck: Normal range of motion. Neck supple. No tracheal deviation present. No thyromegaly present.   Cardiovascular: Normal rate, regular rhythm, normal heart sounds and intact distal pulses. Exam reveals no gallop and no friction rub.   No murmur heard.  Pulmonary/Chest: Effort normal and breath sounds normal. No respiratory distress.   Abdominal: Soft. Bowel sounds are normal. He exhibits no mass. There is no tenderness. There is no guarding.   Musculoskeletal: Normal range of motion. He exhibits no edema.   Lymphadenopathy:     He has no cervical  adenopathy.   Neurological: He is alert and oriented to person, place, and time. He displays normal reflexes. He exhibits normal muscle tone.   Skin: Skin is warm and dry. Capillary refill takes less than 2 seconds. No rash noted. He is not diaphoretic.   Psychiatric: He has a normal mood and affect. His behavior is normal. Judgment and thought content normal.   Nursing note and vitals reviewed.      Recent Results (from the past 2016 hour(s))   Comprehensive Metabolic Panel    Collection Time: 08/21/19  8:27 AM   Result Value Ref Range    Glucose 111 (H) 65 - 99 mg/dL    BUN 37 (H) 8 - 23 mg/dL    Creatinine 1.38 (H) 0.76 - 1.27 mg/dL    eGFR Non African Am 51 (L) >60 mL/min/1.73    eGFR African Am 61 >60 mL/min/1.73    BUN/Creatinine Ratio 26.8 (H) 7.0 - 25.0    Sodium 137 136 - 145 mmol/L    Potassium 4.9 3.5 - 5.2 mmol/L    Chloride 101 98 - 107 mmol/L    Total CO2 24.1 22.0 - 29.0 mmol/L    Calcium 9.3 8.6 - 10.5 mg/dL    Total Protein 6.6 6.0 - 8.5 g/dL    Albumin 4.20 3.50 - 5.20 g/dL    Globulin 2.4 gm/dL    A/G Ratio 1.8 g/dL    Total Bilirubin 0.4 0.2 - 1.2 mg/dL    Alkaline Phosphatase 60 39 - 117 U/L    AST (SGOT) 21 1 - 40 U/L    ALT (SGPT) 20 1 - 41 U/L     Assessment/Plan   Isaac was seen today for muscle pain, med refill, hyperlipidemia and hypertension.    Diagnoses and all orders for this visit:    Leg cramps  -     Comprehensive Metabolic Panel    Edema, unspecified type  -     furosemide (LASIX) 20 MG tablet; Take 1 tablet by mouth Every Morning.    Essential hypertension  -     lisinopril (PRINIVIL,ZESTRIL) 10 MG tablet; Take 1 tablet by mouth Every Morning.    Hyperlipidemia, unspecified hyperlipidemia type    Elevated transaminase level  -     Comprehensive Metabolic Panel

## 2019-08-21 ENCOUNTER — TELEPHONE (OUTPATIENT)
Dept: FAMILY MEDICINE CLINIC | Facility: CLINIC | Age: 74
End: 2019-08-21

## 2019-08-21 NOTE — TELEPHONE ENCOUNTER
Patient was seen on 8-20-19 and is checking on muscle relaxer to be called in to [facility]? Thinks it is Baclofen

## 2019-08-22 DIAGNOSIS — R25.2 LEG CRAMPS: Primary | ICD-10-CM

## 2019-08-22 LAB
ALBUMIN SERPL-MCNC: 4.2 G/DL (ref 3.5–5.2)
ALBUMIN/GLOB SERPL: 1.8 G/DL
ALP SERPL-CCNC: 60 U/L (ref 39–117)
ALT SERPL-CCNC: 20 U/L (ref 1–41)
AST SERPL-CCNC: 21 U/L (ref 1–40)
BILIRUB SERPL-MCNC: 0.4 MG/DL (ref 0.2–1.2)
BUN SERPL-MCNC: 37 MG/DL (ref 8–23)
BUN/CREAT SERPL: 26.8 (ref 7–25)
CALCIUM SERPL-MCNC: 9.3 MG/DL (ref 8.6–10.5)
CHLORIDE SERPL-SCNC: 101 MMOL/L (ref 98–107)
CO2 SERPL-SCNC: 24.1 MMOL/L (ref 22–29)
CREAT SERPL-MCNC: 1.38 MG/DL (ref 0.76–1.27)
GLOBULIN SER CALC-MCNC: 2.4 GM/DL
GLUCOSE SERPL-MCNC: 111 MG/DL (ref 65–99)
POTASSIUM SERPL-SCNC: 4.9 MMOL/L (ref 3.5–5.2)
PROT SERPL-MCNC: 6.6 G/DL (ref 6–8.5)
SODIUM SERPL-SCNC: 137 MMOL/L (ref 136–145)

## 2019-08-22 RX ORDER — BACLOFEN 10 MG/1
10 TABLET ORAL 2 TIMES DAILY PRN
Qty: 30 TABLET | Refills: 0 | Status: SHIPPED | OUTPATIENT
Start: 2019-08-22 | End: 2019-11-26

## 2019-09-23 ENCOUNTER — OFFICE VISIT (OUTPATIENT)
Dept: FAMILY MEDICINE CLINIC | Facility: CLINIC | Age: 74
End: 2019-09-23

## 2019-09-23 VITALS
WEIGHT: 224.2 LBS | SYSTOLIC BLOOD PRESSURE: 110 MMHG | TEMPERATURE: 97.5 F | OXYGEN SATURATION: 98 % | HEART RATE: 95 BPM | RESPIRATION RATE: 17 BRPM | BODY MASS INDEX: 30.41 KG/M2 | DIASTOLIC BLOOD PRESSURE: 80 MMHG

## 2019-09-23 DIAGNOSIS — J18.9 PNEUMONIA DUE TO INFECTIOUS ORGANISM, UNSPECIFIED LATERALITY, UNSPECIFIED PART OF LUNG: Primary | ICD-10-CM

## 2019-09-23 PROCEDURE — 99213 OFFICE O/P EST LOW 20 MIN: CPT | Performed by: FAMILY MEDICINE

## 2019-09-23 PROCEDURE — 71046 X-RAY EXAM CHEST 2 VIEWS: CPT | Performed by: FAMILY MEDICINE

## 2019-09-23 RX ORDER — DOXYCYCLINE HYCLATE 100 MG
100 TABLET ORAL 2 TIMES DAILY
Qty: 20 TABLET | Refills: 0 | Status: SHIPPED | OUTPATIENT
Start: 2019-09-23 | End: 2019-10-03

## 2019-09-23 RX ORDER — GUAIFENESIN 200 MG/10ML
200 LIQUID ORAL 3 TIMES DAILY PRN
Qty: 236 ML | Refills: 1 | Status: SHIPPED | OUTPATIENT
Start: 2019-09-23 | End: 2019-11-26

## 2019-09-23 NOTE — PROGRESS NOTES
Subjective   Isaac Suarez is a 74 y.o. male.     Chief Complaint   Patient presents with   • Pneumonia     went to the ER , [facility] on [address] , on 09/14/2019, got pnuemonia, was sent ATB and something for cough, but he is not any better    • chest congestion     all congestion on his chest        Cough since 9/17/19, got a Zpack and tessalon, clear sputum and no fever, non smoker, went to [place] no wheezing, + sneezing    Cough   This is a new problem. The current episode started 1 to 4 weeks ago. The problem has been unchanged. The problem occurs constantly. The cough is productive of sputum. Associated symptoms include nasal congestion and postnasal drip. Pertinent negatives include no chest pain, fever, hemoptysis or shortness of breath. Nothing aggravates the symptoms. He has tried nothing for the symptoms. The treatment provided no relief. His past medical history is significant for pneumonia. There is no history of asthma or COPD.          The following portions of the patient's history were reviewed and updated as appropriate: allergies, current medications, past family history, past medical history, past social history, past surgical history and problem list.    Past Medical History:   Diagnosis Date   • Abnormal MRI, lumbar spine    • Allergic rhinitis    • Arthritis    • Bilateral hearing loss    • Borderline blood pressure    • BPH (benign prostatic hyperplasia)    • Cancer (CMS/HCC)     PROSTATE   • Chronic back pain     when walking and lying down   • Chronic kidney disease (CKD), stage I    • Cough    • Edema    • Elevated transaminase level    • Erectile dysfunction    • External hemorrhoids    • GERD (gastroesophageal reflux disease)    • High cholesterol    • History of colon polyps    • History of depression    • History of prostate cancer     RADIATION [place]   • Hyperlipidemia    • Hypertension    • Leg cramps    • Low back pain    • Lumbar stenosis    • Muscle spasm    •  Prostate cancer (CMS/HCC)    • Renal cyst    • Statin intolerance    • Vitamin D deficiency        Past Surgical History:   Procedure Laterality Date   • CATARACT EXTRACTION Right    • COLONOSCOPY N/A 8/15/2016    Procedure: COLONOSCOPY INTO CECUM WITH COLD SNARE POLYPECTOMY;  Surgeon: Loc Lee MD;  Location: [facility] ENDOSCOPY;  Service:    • HERNIA REPAIR      bilat inguinal   • INSERTION PROSTATE RADIATION SEED     • LUMBAR DISCECTOMY FUSION INSTRUMENTATION N/A 2016    Procedure: L 4-5 LUMBAR DISCECTOMY FUSION WITH INSTRUMENTATION;  Surgeon: Michael Frias MD;  Location: [facility] MAIN OR;  Service:    • PROSTATE BIOPSY      Needle biopsy   • ROTATOR CUFF REPAIR Bilateral     metal in both   • TOTAL SHOULDER ARTHROPLASTY Bilateral    • VASECTOMY         Family History   Problem Relation Age of Onset   • Cancer Father         BRAIN   • Esophageal cancer Other        Social History     Socioeconomic History   • Marital status:      Spouse name: Not on file   • Number of children: Not on file   • Years of education: 12TH GRADE   • Highest education level: Not on file   Occupational History   • Occupation: RETIRED   Tobacco Use   • Smoking status: Former Smoker     Packs/day: 2.00     Years: 12.00     Pack years: 24.00     Types: Cigarettes     Last attempt to quit:      Years since quittin.7   • Smokeless tobacco: Never Used   Substance and Sexual Activity   • Alcohol use: Yes     Comment: SOCIALLY-Drinks 7 or less drinks per week   • Drug use: No   • Sexual activity: Defer       Review of Systems   Constitutional: Negative for fever.   HENT: Positive for postnasal drip.    Respiratory: Positive for cough. Negative for hemoptysis and shortness of breath.    Cardiovascular: Negative for chest pain.       Objective   Vitals:    19 1456   BP: 110/80   Pulse: 95   Resp: 17   Temp: 97.5 °F (36.4 °C)   SpO2: 98%     Body mass index is 30.41 kg/m².  Physical Exam   Constitutional: He  appears well-developed and well-nourished.   HENT:   Head: Normocephalic and atraumatic.   Right Ear: External ear normal.   Left Ear: External ear normal.   Mouth/Throat: Oropharynx is clear and moist.   Cardiovascular: Normal rate, regular rhythm and normal heart sounds. Exam reveals no gallop and no friction rub.   No murmur heard.  Pulmonary/Chest: Effort normal. No stridor. No respiratory distress. He has no wheezes. He has rales. He exhibits no tenderness.   + crackles Bilateral   Musculoskeletal: Normal range of motion.   Neurological: He is alert.   Skin: Skin is warm.   Nursing note and vitals reviewed.        Assessment/Plan   Isaac was seen today for pneumonia and chest congestion.    Diagnoses and all orders for this visit:    Pneumonia due to infectious organism, unspecified laterality, unspecified part of lung  -     XR Chest 2 View  -     guaifenesin (ROBITUSSIN) 100 MG/5ML liquid; Take 10 mL by mouth 3 (Three) Times a Day As Needed for Cough.  -     doxycycline (VIBRAMYICN) 100 MG tablet; Take 1 tablet by mouth 2 (Two) Times a Day for 10 days.      Patient agreed to go to the ER if no improvement

## 2019-10-01 ENCOUNTER — TELEPHONE (OUTPATIENT)
Dept: FAMILY MEDICINE CLINIC | Facility: CLINIC | Age: 74
End: 2019-10-01

## 2019-10-07 DIAGNOSIS — I10 ESSENTIAL HYPERTENSION: ICD-10-CM

## 2019-10-07 DIAGNOSIS — R60.9 EDEMA, UNSPECIFIED TYPE: ICD-10-CM

## 2019-10-07 RX ORDER — LISINOPRIL 10 MG/1
10 TABLET ORAL EVERY MORNING
Qty: 30 TABLET | Refills: 0 | Status: SHIPPED | OUTPATIENT
Start: 2019-10-07 | End: 2019-11-26

## 2019-10-07 RX ORDER — FUROSEMIDE 20 MG/1
20 TABLET ORAL EVERY MORNING
Qty: 30 TABLET | Refills: 0 | Status: SHIPPED | OUTPATIENT
Start: 2019-10-07 | End: 2019-11-26

## 2019-10-14 DIAGNOSIS — R60.9 EDEMA, UNSPECIFIED TYPE: ICD-10-CM

## 2019-10-14 RX ORDER — FUROSEMIDE 20 MG/1
20 TABLET ORAL EVERY MORNING
Qty: 90 TABLET | Refills: 0 | OUTPATIENT
Start: 2019-10-14

## 2019-11-26 ENCOUNTER — OFFICE VISIT (OUTPATIENT)
Dept: FAMILY MEDICINE CLINIC | Facility: CLINIC | Age: 74
End: 2019-11-26

## 2019-11-26 VITALS
HEART RATE: 56 BPM | WEIGHT: 238 LBS | TEMPERATURE: 97.6 F | HEIGHT: 72 IN | BODY MASS INDEX: 32.23 KG/M2 | DIASTOLIC BLOOD PRESSURE: 80 MMHG | SYSTOLIC BLOOD PRESSURE: 138 MMHG | OXYGEN SATURATION: 97 %

## 2019-11-26 DIAGNOSIS — E55.9 VITAMIN D DEFICIENCY: ICD-10-CM

## 2019-11-26 DIAGNOSIS — M62.838 MUSCLE SPASM: ICD-10-CM

## 2019-11-26 DIAGNOSIS — R25.2 LEG CRAMPS: ICD-10-CM

## 2019-11-26 DIAGNOSIS — E66.9 CLASS 1 OBESITY WITHOUT SERIOUS COMORBIDITY WITH BODY MASS INDEX (BMI) OF 32.0 TO 32.9 IN ADULT, UNSPECIFIED OBESITY TYPE: ICD-10-CM

## 2019-11-26 DIAGNOSIS — R60.9 EDEMA, UNSPECIFIED TYPE: ICD-10-CM

## 2019-11-26 DIAGNOSIS — Z00.00 MEDICARE ANNUAL WELLNESS VISIT, SUBSEQUENT: Primary | ICD-10-CM

## 2019-11-26 DIAGNOSIS — Z11.59 ENCOUNTER FOR HEPATITIS C SCREENING TEST FOR LOW RISK PATIENT: ICD-10-CM

## 2019-11-26 PROCEDURE — 99213 OFFICE O/P EST LOW 20 MIN: CPT | Performed by: INTERNAL MEDICINE

## 2019-11-26 PROCEDURE — G0439 PPPS, SUBSEQ VISIT: HCPCS | Performed by: INTERNAL MEDICINE

## 2019-11-26 RX ORDER — ROPINIROLE 0.25 MG/1
0.25 TABLET, FILM COATED ORAL NIGHTLY
Qty: 30 TABLET | Refills: 3 | Status: SHIPPED | OUTPATIENT
Start: 2019-11-26 | End: 2020-03-30

## 2019-11-26 RX ORDER — FUROSEMIDE 20 MG/1
20 TABLET ORAL EVERY MORNING
Qty: 90 TABLET | Refills: 1 | Status: SHIPPED | OUTPATIENT
Start: 2019-11-26 | End: 2020-05-26 | Stop reason: SDUPTHER

## 2019-11-26 NOTE — PATIENT INSTRUCTIONS
Exercising to Lose Weight  Exercise is structured, repetitive physical activity to improve fitness and health. Getting regular exercise is important for everyone. It is especially important if you are overweight. Being overweight increases your risk of heart disease, stroke, diabetes, high blood pressure, and several types of cancer. Reducing your calorie intake and exercising can help you lose weight.  Exercise is usually categorized as moderate or vigorous intensity. To lose weight, most people need to do a certain amount of moderate-intensity or vigorous-intensity exercise each week.  Moderate-intensity exercise    Moderate-intensity exercise is any activity that gets you moving enough to burn at least three times more energy (calories) than if you were sitting.  Examples of moderate exercise include:  · Walking a mile in 15 minutes.  · Doing light yard work.  · Biking at an easy pace.  Most people should get at least 150 minutes (2 hours and 30 minutes) a week of moderate-intensity exercise to maintain their body weight.  Vigorous-intensity exercise  Vigorous-intensity exercise is any activity that gets you moving enough to burn at least six times more calories than if you were sitting. When you exercise at this intensity, you should be working hard enough that you are not able to carry on a conversation.  Examples of vigorous exercise include:  · Running.  · Playing a team sport, such as football, basketball, and soccer.  · Jumping rope.  Most people should get at least 75 minutes (1 hour and 15 minutes) a week of vigorous-intensity exercise to maintain their body weight.  How can exercise affect me?  When you exercise enough to burn more calories than you eat, you lose weight. Exercise also reduces body fat and builds muscle. The more muscle you have, the more calories you burn. Exercise also:  · Improves mood.  · Reduces stress and tension.  · Improves your overall fitness, flexibility, and  endurance.  · Increases bone strength.  The amount of exercise you need to lose weight depends on:  · Your age.  · The type of exercise.  · Any health conditions you have.  · Your overall physical ability.  Talk to your health care provider about how much exercise you need and what types of activities are safe for you.  What actions can I take to lose weight?  Nutrition    · Make changes to your diet as told by your health care provider or diet and nutrition specialist (dietitian). This may include:  ? Eating fewer calories.  ? Eating more protein.  ? Eating less unhealthy fats.  ? Eating a diet that includes fresh fruits and vegetables, whole grains, low-fat dairy products, and lean protein.  ? Avoiding foods with added fat, salt, and sugar.  · Drink plenty of water while you exercise to prevent dehydration or heat stroke.  Activity  · Choose an activity that you enjoy and set realistic goals. Your health care provider can help you make an exercise plan that works for you.  · Exercise at a moderate or vigorous intensity most days of the week.  ? The intensity of exercise may vary from person to person. You can tell how intense a workout is for you by paying attention to your breathing and heartbeat. Most people will notice their breathing and heartbeat get faster with more intense exercise.  · Do resistance training twice each week, such as:  ? Push-ups.  ? Sit-ups.  ? Lifting weights.  ? Using resistance bands.  · Getting short amounts of exercise can be just as helpful as long structured periods of exercise. If you have trouble finding time to exercise, try to include exercise in your daily routine.  ? Get up, stretch, and walk around every 30 minutes throughout the day.  ? Go for a walk during your lunch break.  ? Park your car farther away from your destination.  ? If you take public transportation, get off one stop early and walk the rest of the way.  ? Make phone calls while standing up and walking  around.  ? Take the stairs instead of elevators or escalators.  · Wear comfortable clothes and shoes with good support.  · Do not exercise so much that you hurt yourself, feel dizzy, or get very short of breath.  Where to find more information  · [place] Department of Health and Human Services: www.hhs.gov  · Centers for Disease Control and Prevention (CDC): www.cdc.gov  Contact a health care provider:  · Before starting a new exercise program.  · If you have questions or concerns about your weight.  · If you have a medical problem that keeps you from exercising.  Get help right away if you have any of the following while exercising:  · Injury.  · Dizziness.  · Difficulty breathing or shortness of breath that does not go away when you stop exercising.  · Chest pain.  · Rapid heartbeat.  Summary  · Being overweight increases your risk of heart disease, stroke, diabetes, high blood pressure, and several types of cancer.  · Losing weight happens when you burn more calories than you eat.  · Reducing the amount of calories you eat in addition to getting regular moderate or vigorous exercise each week helps you lose weight.  This information is not intended to replace advice given to you by your health care provider. Make sure you discuss any questions you have with your health care provider.  Document Released: 01/20/2012 Document Revised: 12/31/2018 Document Reviewed: 12/31/2018  [employer] Patient Education © 2019 [employer]      Calorie Counting for Weight Loss  Calories are units of energy. Your body needs a certain amount of calories from food to keep you going throughout the day. When you eat more calories than your body needs, your body stores the extra calories as fat. When you eat fewer calories than your body needs, your body burns fat to get the energy it needs.  Calorie counting means keeping track of how many calories you eat and drink each day. Calorie counting can be helpful if you need to lose  weight. If you make sure to eat fewer calories than your body needs, you should lose weight. Ask your health care provider what a healthy weight is for you.  For calorie counting to work, you will need to eat the right number of calories in a day in order to lose a healthy amount of weight per week. A dietitian can help you determine how many calories you need in a day and will give you suggestions on how to reach your calorie goal.  · A healthy amount of weight to lose per week is usually 1-2 lb (0.5-0.9 kg). This usually means that your daily calorie intake should be reduced by 500-750 calories.  · Eating 1,200 - 1,500 calories per day can help most women lose weight.  · Eating 1,500 - 1,800 calories per day can help most men lose weight.  What is my plan?  My goal is to have __________ calories per day.  If I have this many calories per day, I should lose around __________ pounds per week.  What do I need to know about calorie counting?  In order to meet your daily calorie goal, you will need to:  · Find out how many calories are in each food you would like to eat. Try to do this before you eat.  · Decide how much of the food you plan to eat.  · Write down what you ate and how many calories it had. Doing this is called keeping a food log.  To successfully lose weight, it is important to balance calorie counting with a healthy lifestyle that includes regular activity. Aim for 150 minutes of moderate exercise (such as walking) or 75 minutes of vigorous exercise (such as running) each week.  Where do I find calorie information?    The number of calories in a food can be found on a Nutrition Facts label. If a food does not have a Nutrition Facts label, try to look up the calories online or ask your dietitian for help.  Remember that calories are listed per serving. If you choose to have more than one serving of a food, you will have to multiply the calories per serving by the amount of servings you plan to eat. For  "example, the label on a package of bread might say that a serving size is 1 slice and that there are 90 calories in a serving. If you eat 1 slice, you will have eaten 90 calories. If you eat 2 slices, you will have eaten 180 calories.  How do I keep a food log?  Immediately after each meal, record the following information in your food log:  · What you ate. Don't forget to include toppings, sauces, and other extras on the food.  · How much you ate. This can be measured in cups, ounces, or number of items.  · How many calories each food and drink had.  · The total number of calories in the meal.  Keep your food log near you, such as in a small notebook in your pocket, or use a mobile [place] or website. Some programs will calculate calories for you and show you how many calories you have left for the day to meet your goal.  What are some calorie counting tips?    · Use your calories on foods and drinks that will fill you up and not leave you hungry:  ? Some examples of foods that fill you up are nuts and nut butters, vegetables, lean proteins, and high-fiber foods like whole grains. High-fiber foods are foods with more than 5 g fiber per serving.  ? Drinks such as sodas, specialty coffee drinks, alcohol, and juices have a lot of calories, yet do not fill you up.  · Eat nutritious foods and avoid empty calories. Empty calories are calories you get from foods or beverages that do not have many vitamins or protein, such as candy, sweets, and soda. It is better to have a nutritious high-calorie food (such as an avocado) than a food with few nutrients (such as a bag of chips).  · Know how many calories are in the foods you eat most often. This will help you calculate calorie counts faster.  · Pay attention to calories in drinks. Low-calorie drinks include water and unsweetened drinks.  · Pay attention to nutrition labels for \"low fat\" or \"fat free\" foods. These foods sometimes have the same amount of calories or more calories " than the full fat versions. They also often have added sugar, starch, or salt, to make up for flavor that was removed with the fat.  · Find a way of tracking calories that works for you. Get creative. Try different apps or programs if writing down calories does not work for you.  What are some portion control tips?  · Know how many calories are in a serving. This will help you know how many servings of a certain food you can have.  · Use a measuring cup to measure serving sizes. You could also try weighing out portions on a kitchen scale. With time, you will be able to estimate serving sizes for some foods.  · Take some time to put servings of different foods on your favorite plates, bowls, and cups so you know what a serving looks like.  · Try not to eat straight from a bag or box. Doing this can lead to overeating. Put the amount you would like to eat in a cup or on a plate to make sure you are eating the right portion.  · Use smaller plates, glasses, and bowls to prevent overeating.  · Try not to multitask (for example, watch TV or use your computer) while eating. If it is time to eat, sit down at a table and enjoy your food. This will help you to know when you are full. It will also help you to be aware of what you are eating and how much you are eating.  What are tips for following this plan?  Reading food labels  · Check the calorie count compared to the serving size. The serving size may be smaller than what you are used to eating.  · Check the source of the calories. Make sure the food you are eating is high in vitamins and protein and low in saturated and trans fats.  Shopping  · Read nutrition labels while you shop. This will help you make healthy decisions before you decide to purchase your food.  · Make a grocery list and stick to it.  Cooking  · Try to cook your favorite foods in a healthier way. For example, try baking instead of frying.  · Use low-fat dairy products.  Meal planning  · Use more fruits  "and vegetables. Half of your plate should be fruits and vegetables.  · Include lean proteins like poultry and fish.  How do I count calories when eating out?  · Ask for smaller portion sizes.  · Consider sharing an entree and sides instead of getting your own entree.  · If you get your own entree, eat only half. Ask for a box at the beginning of your meal and put the rest of your entree in it so you are not tempted to eat it.  · If calories are listed on the menu, choose the lower calorie options.  · Choose dishes that include vegetables, fruits, whole grains, low-fat dairy products, and lean protein.  · Choose items that are boiled, broiled, grilled, or steamed. Stay away from items that are buttered, battered, fried, or served with cream sauce. Items labeled \"crispy\" are usually fried, unless stated otherwise.  · Choose water, low-fat milk, unsweetened iced tea, or other drinks without added sugar. If you want an alcoholic beverage, choose a lower calorie option such as a glass of wine or light beer.  · Ask for dressings, sauces, and syrups on the side. These are usually high in calories, so you should limit the amount you eat.  · If you want a salad, choose a garden salad and ask for grilled meats. Avoid extra toppings like viveros, cheese, or fried items. Ask for the dressing on the side, or ask for olive oil and vinegar or lemon to use as dressing.  · Estimate how many servings of a food you are given. For example, a serving of cooked rice is ½ cup or about the size of half a baseball. Knowing serving sizes will help you be aware of how much food you are eating at restaurants. The list below tells you how big or small some common portion sizes are based on everyday objects:  ? 1 oz--4 stacked dice.  ? 3 oz--1 deck of cards.  ? 1 tsp--1 die.  ? 1 Tbsp--½ a ping-pong ball.  ? 2 Tbsp--1 ping-pong ball.  ? ½ cup--½ baseball.  ? 1 cup--1 baseball.  Summary  · Calorie counting means keeping track of how many calories " you eat and drink each day. If you eat fewer calories than your body needs, you should lose weight.  · A healthy amount of weight to lose per week is usually 1-2 lb (0.5-0.9 kg). This usually means reducing your daily calorie intake by 500-750 calories.  · The number of calories in a food can be found on a Nutrition Facts label. If a food does not have a Nutrition Facts label, try to look up the calories online or ask your dietitian for help.  · Use your calories on foods and drinks that will fill you up, and not on foods and drinks that will leave you hungry.  · Use smaller plates, glasses, and bowls to prevent overeating.  This information is not intended to replace advice given to you by your health care provider. Make sure you discuss any questions you have with your health care provider.  Document Released: 2006 Document Revised: 2019 Document Reviewed: 2017  [employer] Interactive Patient Education © 2019 [employer]    Medicare Wellness  Personal Prevention Plan of Service     Date of Office Visit:  2019  Encounter Provider:  Guillermo Moran MD  Place of Service:  [facility]  Patient Name: Isaac Suarez  :  1945    As part of the Medicare Wellness portion of your visit today, we are providing you with this personalized preventive plan of services (PPPS). This plan is based upon recommendations of the [place] Preventive Services Task Force (USPSTF) and the Advisory Committee on Immunization Practices (ACIP).    This lists the preventive care services that should be considered, and provides dates of when you are due. Items listed as completed are up-to-date and do not require any further intervention.    Health Maintenance   Topic Date Due   • TDAP/TD VACCINES (1 - Tdap) 1965   • ZOSTER VACCINE (1 of 2) 1996   • HEPATITIS C SCREENING  2017   • MEDICARE ANNUAL WELLNESS  2019   • AAA SCREEN (ONE-TIME)  2019   •  COLONOSCOPY  08/15/2026   • INFLUENZA VACCINE  Completed   • PNEUMOCOCCAL VACCINES (65+ LOW/MEDIUM RISK)  Completed   • LIPID PANEL  Discontinued       Orders Placed This Encounter   Procedures   • Hepatitis C Antibody   • Vitamin D 25 Hydroxy   • Vitamin B12     Standing Status:   Future     Standing Expiration Date:   11/26/2020   • Folate     Standing Status:   Future     Standing Expiration Date:   11/26/2020   • CK     Standing Status:   Future     Standing Expiration Date:   11/26/2020   • CBC & Differential     Order Specific Question:   Manual Differential     Answer:   No       Return in about 6 months (around 5/26/2020) for Next scheduled follow up.

## 2019-11-26 NOTE — PROGRESS NOTES
Subsequent Medicare Wellness Visit   The ABC's of the Annual Wellness Visit    Chief Complaint   Patient presents with   • Spasms     feet and ankles    • Annual Exam       HPI:  Isaac Suarez, -1945, is a 74 y.o. male who presents for a Subsequent Medicare Wellness Visit.  Here for medicare subsequent visit.  Complains of many months of leg cramps increasing in frequency and duration.  Had CMP that was overall normal other than mild renal insufficiency.  Toes sometimes curl.  Cramps are never in the thigh.  Cannot take statins and is not taking any.  Tried stopping the lisinopril and the furosemide with no relief and he restarted them.  No rashes or sores.  Has 0-10 episodes a night that wake him from sleeping.  When sit still feels he needs to move the feet all the time.    Recent Hospitalizations:  No hospitalization(s) within the last year..    Current Medical Providers:  Patient Care Team:  Guillermo Moran MD as PCP - General (Internal Medicine)  Tyrel Molina MD (Inactive) as Consulting Physician (Pain Medicine)  Edward Starkey MD as Surgeon (Orthopedic Surgery)   Marisa - urology  Casseneli - spine surgery    Health Habits and Functional and Cognitive Screening and Depression Screening:  Functional & Cognitive Status 2019   Do you have difficulty preparing food and eating? No   Do you have difficulty bathing yourself, getting dressed or grooming yourself? No   Do you have difficulty using the toilet? No   Do you have difficulty moving around from place to place? No   Do you have trouble with steps or getting out of a bed or a chair? No   Current Diet Low Carb Diet   Exercise (times per week) 0 times per week   Do you need help using the phone?  No   Are you deaf or do you have serious difficulty hearing?  Yes   Do you need help with transportation? No   Do you need help shopping? No   Do you need help preparing meals?  No   Do you need help with housework?  No   Do you  need help with laundry? No   Do you need help taking your medications? No   Do you need help managing money? No   Do you ever drive or ride in a car without wearing a seat belt? No   Have you felt unusual stress, anger or loneliness in the last month? No   Who do you live with? Spouse   If you need help, do you have trouble finding someone available to you? No   Have you been bothered in the last four weeks by sexual problems? No   Do you have difficulty concentrating, remembering or making decisions? No       Compared to one year ago, the patient feels his physical health is the same and his mental health is the same.    Depression Screen:  PHQ-2/PHQ-9 Depression Screening 11/26/2019   Little interest or pleasure in doing things 0   Feeling down, depressed, or hopeless 0   Trouble falling or staying asleep, or sleeping too much 0   Feeling tired or having little energy 0   Poor appetite or overeating 0   Feeling bad about yourself - or that you are a failure or have let yourself or your family down 0   Trouble concentrating on things, such as reading the newspaper or watching television 0   Moving or speaking so slowly that other people could have noticed. Or the opposite - being so fidgety or restless that you have been moving around a lot more than usual 0   Thoughts that you would be better off dead, or of hurting yourself in some way 0   Total Score 0         Past Medical/Family/Social History:  The following portions of the patient's history were reviewed and updated as appropriate: allergies, current medications, past family history, past medical history, past social history, past surgical history and problem list.    Allergies   Allergen Reactions   • Crestor [Rosuvastatin] Myalgia   • Penicillins Hives     HIVES   • Zetia [Ezetimibe] Myalgia   • Zocor [Simvastatin] Myalgia         Current Outpatient Medications:   •  aspirin 81 MG EC tablet, Take 81 mg by mouth every morning. STOPPED 9/12/16, Disp: , Rfl:    •  furosemide (LASIX) 20 MG tablet, Take 1 tablet by mouth Every Morning., Disp: 90 tablet, Rfl: 1  •  lisinopril (PRINIVIL,ZESTRIL) 10 MG tablet, Take 1 tablet by mouth Every Morning., Disp: 90 tablet, Rfl: 1  •  rOPINIRole (REQUIP) 0.25 MG tablet, Take 1 tablet by mouth Every Night. Take 1 hour before bedtime., Disp: 30 tablet, Rfl: 3    Aspirin use counseling: Taking ASA appropriately as indicated    Current medication list contains no high risk medications.  No harmful drug interactions have been identified.     Family History   Problem Relation Age of Onset   • Cancer Father         BRAIN   • Esophageal cancer Other        Social History     Tobacco Use   • Smoking status: Former Smoker     Packs/day: 2.00     Years: 12.00     Pack years: 24.00     Types: Cigarettes     Last attempt to quit:      Years since quittin.9   • Smokeless tobacco: Never Used   Substance Use Topics   • Alcohol use: Yes     Comment: SOCIALLY-Drinks 7 or less drinks per week       Past Surgical History:   Procedure Laterality Date   • CATARACT EXTRACTION Right    • COLONOSCOPY N/A 8/15/2016    Procedure: COLONOSCOPY INTO CECUM WITH COLD SNARE POLYPECTOMY;  Surgeon: Loc Lee MD;  Location: [facility] ENDOSCOPY;  Service:    • HERNIA REPAIR      bilat inguinal   • INSERTION PROSTATE RADIATION SEED     • LUMBAR DISCECTOMY FUSION INSTRUMENTATION N/A 2016    Procedure: L 4-5 LUMBAR DISCECTOMY FUSION WITH INSTRUMENTATION;  Surgeon: Michael Frias MD;  Location: [facility] OR;  Service:    • PROSTATE BIOPSY      Needle biopsy   • ROTATOR CUFF REPAIR Bilateral     metal in both   • TOTAL SHOULDER ARTHROPLASTY Bilateral    • VASECTOMY         Patient Active Problem List   Diagnosis   • Chronic midline low back pain without sciatica   • Bilateral sacroiliitis (CMS/HCC)   • Hypertension   • Hyperlipidemia   • Leg cramps   • Elevated transaminase level   • Medicare annual wellness visit, subsequent   • Encounter for hepatitis  "C screening test for low risk patient   • Vitamin D deficiency   • Muscle spasm       Review of Systems   Constitutional: Negative for activity change, appetite change and unexpected weight change.   HENT: Negative for dental problem, tinnitus, trouble swallowing and voice change.    Eyes: Negative for visual disturbance.   Respiratory: Negative for cough, chest tightness and shortness of breath.    Cardiovascular: Negative for chest pain, palpitations and leg swelling.   Gastrointestinal: Negative for abdominal pain, blood in stool, constipation, diarrhea, nausea and vomiting.   Endocrine: Negative for polyuria.   Genitourinary: Negative for dysuria and hematuria.   Musculoskeletal: Negative for arthralgias and back pain.        Muscle cramps.   Skin: Negative for rash.   Neurological: Negative for dizziness, tremors, weakness, light-headedness, numbness and headaches.   Hematological: Negative for adenopathy. Does not bruise/bleed easily.   Psychiatric/Behavioral: Negative for confusion and sleep disturbance. The patient is not nervous/anxious.        Objective     Vitals:    11/26/19 0823   BP: 138/80   BP Location: Right arm   Patient Position: Sitting   Cuff Size: Adult   Pulse: 56   Temp: 97.6 °F (36.4 °C)   SpO2: 97%   Weight: 108 kg (238 lb)   Height: 182.9 cm (72.01\")       Patient's Body mass index is 32.27 kg/m². BMI is above normal parameters. Recommendations include: exercise counseling and nutrition counseling.      No exam data present    The patient has no evidence of cognitve impairment.     Physical Exam   Constitutional: He is oriented to person, place, and time. He appears well-developed and well-nourished. No distress.   HENT:   Head: Normocephalic and atraumatic.   Right Ear: External ear normal.   Left Ear: External ear normal.   Nose: Nose normal.   Mouth/Throat: Oropharynx is clear and moist.   Eyes: Conjunctivae and EOM are normal. Pupils are equal, round, and reactive to light.   Neck: " Normal range of motion. Neck supple. No tracheal deviation present. No thyromegaly present.   Cardiovascular: Normal rate, regular rhythm, normal heart sounds and intact distal pulses. Exam reveals no gallop and no friction rub.   No murmur heard.  Pulmonary/Chest: Effort normal and breath sounds normal. No respiratory distress.   Abdominal: Soft. Bowel sounds are normal. He exhibits no mass. There is no tenderness. There is no guarding.   Musculoskeletal: Normal range of motion. He exhibits no edema.   Lymphadenopathy:     He has no cervical adenopathy.   Neurological: He is alert and oriented to person, place, and time. He displays normal reflexes. He exhibits normal muscle tone.   Skin: Skin is warm and dry. Capillary refill takes less than 2 seconds. No rash noted. He is not diaphoretic.   Psychiatric: He has a normal mood and affect. His behavior is normal. Judgment and thought content normal.   Nursing note and vitals reviewed.      Recent Lab Results:  Lab Results   Component Value Date     (H) 08/21/2019          Assessment/Plan   Age-appropriate Screening Schedule:  Refer to the list below for future screening recommendations based on patient's age, sex and/or medical conditions.      Health Maintenance   Topic Date Due   • TDAP/TD VACCINES (1 - Tdap) 09/23/1965   • ZOSTER VACCINE (1 of 2) 09/23/1996   • COLONOSCOPY  08/15/2026   • INFLUENZA VACCINE  Completed   • PNEUMOCOCCAL VACCINES (65+ LOW/MEDIUM RISK)  Completed   • LIPID PANEL  Discontinued       Medicare Risks and Personalized Health Plan:  Advance Directive Discussion  Fall Risk  Glaucoma Risk  Hearing Problem  Obesity/Overweight       CMS-Preventive Services Quick Reference  Medicare Preventive Services Addressed:  Annual Wellness Visit (AWV)  Glaucoma screening (for individuals with diabetes mellitus, family history of glaucoma, -Americans (> or =) age 50, -Americans (> or =) age 65)  Hepatitis C Virus Screening  (beneficiaries must fall into one of the following categories to be eligible- high risk for HCV infection, born between 1073-[birth year]382, or history of blood transfusion before 1992)    Advance Care Planning:  Patient does not have an advance directive - information provided to the patient today    Diagnoses and all orders for this visit:    1. Medicare annual wellness visit, subsequent (Primary)    2. Encounter for hepatitis C screening test for low risk patient  -     Hepatitis C Antibody    3. Vitamin D deficiency  -     Vitamin D 25 Hydroxy    4. Leg cramps  -     CBC & Differential  -     Vitamin B12; Future  -     Folate; Future  -     CK; Future  -     rOPINIRole (REQUIP) 0.25 MG tablet; Take 1 tablet by mouth Every Night. Take 1 hour before bedtime.  Dispense: 30 tablet; Refill: 3    5. Muscle spasm  -     CBC & Differential  -     Vitamin B12; Future  -     Folate; Future  -     CK; Future  -     rOPINIRole (REQUIP) 0.25 MG tablet; Take 1 tablet by mouth Every Night. Take 1 hour before bedtime.  Dispense: 30 tablet; Refill: 3    6. Edema, unspecified type  -     furosemide (LASIX) 20 MG tablet; Take 1 tablet by mouth Every Morning.  Dispense: 90 tablet; Refill: 1    7. Class 1 obesity without serious comorbidity with body mass index (BMI) of 32.0 to 32.9 in adult, unspecified obesity type      Annual wellness visit reviewed with patient.  All past history medications and problem list were reviewed.  Discussed annual directive/living will with patient he currently does not have an advanced directive is not interested in additional information at this time.  Discussed colorectal cancer screening and he is currently up to date with colonoscopy till 2026.  Encouraged to have annual eye exam for glaucoma screening.  Discussed influenza vaccination and recommended annual flu shot in the fall.  Recommend the shingrix vaccination series.  Currently is up-to-date on his Prevnar 13 and Pneumovax 23 immunizations.  Discussed  prostate screening for which he is currently seeing urology.  Encourage diet exercise and weight loss for healthy weight goal.  Discussed fall risk in the home and recommend removal of all throw rugs from the home and the use of grab bars within the bathrooms in the house.  Will obtain appropriate screening tests today.  Discussed hearing issues that is chronic and is already using hearing aids.  No cholesterol screening as he cannot tolerate any treatments.    Discussed the leg cramps.  Will check the creatine kinase level, CBC and hep C screening.  CMP reviewed from 8/21/19.  Will try ropinorole for the legs.  Continue the current medications.  An After Visit Summary and PPPS with all of these plans were given to the patient.      Follow Up:  Return in about 6 months (around 5/26/2020) for Next scheduled follow up.

## 2019-11-27 DIAGNOSIS — R25.2 LEG CRAMPS: ICD-10-CM

## 2019-11-27 DIAGNOSIS — M62.838 MUSCLE SPASM: ICD-10-CM

## 2019-11-28 LAB
25(OH)D3+25(OH)D2 SERPL-MCNC: 32.8 NG/ML (ref 30–100)
BASOPHILS # BLD AUTO: 0.03 10*3/MM3 (ref 0–0.2)
BASOPHILS NFR BLD AUTO: 0.5 % (ref 0–1.5)
CK SERPL-CCNC: 301 U/L (ref 20–200)
EOSINOPHIL # BLD AUTO: 0.23 10*3/MM3 (ref 0–0.4)
EOSINOPHIL NFR BLD AUTO: 4.2 % (ref 0.3–6.2)
ERYTHROCYTE [DISTWIDTH] IN BLOOD BY AUTOMATED COUNT: 13.6 % (ref 12.3–15.4)
FOLATE SERPL-MCNC: 8.09 NG/ML (ref 4.78–24.2)
HCT VFR BLD AUTO: 36.2 % (ref 37.5–51)
HCV AB S/CO SERPL IA: <0.1 S/CO RATIO (ref 0–0.9)
HGB BLD-MCNC: 12 G/DL (ref 13–17.7)
IMM GRANULOCYTES # BLD AUTO: 0.1 10*3/MM3 (ref 0–0.05)
IMM GRANULOCYTES NFR BLD AUTO: 1.8 % (ref 0–0.5)
LYMPHOCYTES # BLD AUTO: 0.95 10*3/MM3 (ref 0.7–3.1)
LYMPHOCYTES NFR BLD AUTO: 17.2 % (ref 19.6–45.3)
MCH RBC QN AUTO: 30.9 PG (ref 26.6–33)
MCHC RBC AUTO-ENTMCNC: 33.1 G/DL (ref 31.5–35.7)
MCV RBC AUTO: 93.3 FL (ref 79–97)
MONOCYTES # BLD AUTO: 0.45 10*3/MM3 (ref 0.1–0.9)
MONOCYTES NFR BLD AUTO: 8.1 % (ref 5–12)
NEUTROPHILS # BLD AUTO: 3.77 10*3/MM3 (ref 1.7–7)
NEUTROPHILS NFR BLD AUTO: 68.2 % (ref 42.7–76)
NRBC BLD AUTO-RTO: 0 /100 WBC (ref 0–0.2)
PLATELET # BLD AUTO: 244 10*3/MM3 (ref 140–450)
RBC # BLD AUTO: 3.88 10*6/MM3 (ref 4.14–5.8)
VIT B12 SERPL-MCNC: >2000 PG/ML (ref 211–946)
WBC # BLD AUTO: 5.53 10*3/MM3 (ref 3.4–10.8)

## 2020-03-30 DIAGNOSIS — M62.838 MUSCLE SPASM: ICD-10-CM

## 2020-03-30 DIAGNOSIS — R25.2 LEG CRAMPS: ICD-10-CM

## 2020-03-30 RX ORDER — ROPINIROLE 0.25 MG/1
TABLET, FILM COATED ORAL
Qty: 30 TABLET | Refills: 3 | Status: SHIPPED | OUTPATIENT
Start: 2020-03-30 | End: 2020-05-26 | Stop reason: SDUPTHER

## 2020-04-28 DIAGNOSIS — I10 ESSENTIAL HYPERTENSION: ICD-10-CM

## 2020-04-29 RX ORDER — LISINOPRIL 10 MG/1
10 TABLET ORAL EVERY MORNING
Qty: 30 TABLET | Refills: 1 | Status: SHIPPED | OUTPATIENT
Start: 2020-04-29 | End: 2020-05-26 | Stop reason: SDUPTHER

## 2020-05-26 ENCOUNTER — TELEMEDICINE (OUTPATIENT)
Dept: FAMILY MEDICINE CLINIC | Facility: CLINIC | Age: 75
End: 2020-05-26

## 2020-05-26 DIAGNOSIS — I10 ESSENTIAL HYPERTENSION: Primary | ICD-10-CM

## 2020-05-26 DIAGNOSIS — R25.2 LEG CRAMPS: ICD-10-CM

## 2020-05-26 DIAGNOSIS — M62.838 MUSCLE SPASM: ICD-10-CM

## 2020-05-26 DIAGNOSIS — G25.81 RLS (RESTLESS LEGS SYNDROME): ICD-10-CM

## 2020-05-26 DIAGNOSIS — G62.9 NEUROPATHY: ICD-10-CM

## 2020-05-26 DIAGNOSIS — R60.9 EDEMA, UNSPECIFIED TYPE: ICD-10-CM

## 2020-05-26 PROBLEM — Z98.1 S/P LUMBAR SPINAL FUSION: Status: ACTIVE | Noted: 2018-06-01

## 2020-05-26 PROCEDURE — 99214 OFFICE O/P EST MOD 30 MIN: CPT | Performed by: INTERNAL MEDICINE

## 2020-05-26 RX ORDER — FUROSEMIDE 20 MG/1
20 TABLET ORAL EVERY MORNING
Qty: 90 TABLET | Refills: 1 | Status: SHIPPED | OUTPATIENT
Start: 2020-05-26 | End: 2020-11-20

## 2020-05-26 RX ORDER — ROPINIROLE 0.25 MG/1
0.25 TABLET, FILM COATED ORAL DAILY
Qty: 90 TABLET | Refills: 1 | Status: ON HOLD | OUTPATIENT
Start: 2020-05-26 | End: 2021-08-06 | Stop reason: SDUPTHER

## 2020-05-26 RX ORDER — LISINOPRIL 10 MG/1
10 TABLET ORAL EVERY MORNING
Qty: 90 TABLET | Refills: 1 | Status: SHIPPED | OUTPATIENT
Start: 2020-05-26 | End: 2020-11-20

## 2020-05-26 NOTE — PROGRESS NOTES
Subjective   Isaac Suarez is a 74 y.o. male.     Chief Complaint   Patient presents with   • Hypertension   • RLS   • neuropathy in the feet       History of Present Illness   You have chosen to receive care through a telehealth visit.  Do you consent to use a video/audio connection for your medical care today? Yes  Video visit completed utilizing [employer] video visit.    Follow-up for hypertension.  Currently has been feeling well and asymptomatic without any headaches,vision changes, cough, chest pain, shortness of breath, swelling, focal neurologic deficit, memory loss or syncope.  Has been taking the medications regularly and adherent with the regimen of lisinopril 10 mg and furosemide 20 mg QAM.  Denies medication side effects and no significant interval events.     Follow-up RLS.  Doing well on ropinirole 0.25 mg daily.    Patient continues to have cramping in the feet and legs.  States has some pins and needles in the feet.  Rides the bike 2-3 miles with no difficulty till gets off and then feet unsteady on him for 5-6 minutes then resolves.  Also, occurs when standing for too long.  Not when walking.  Has had some intermittent sciatic pain in the past and some back surgery but not really having back pain.  No medication changes and states is chronic.    The following portions of the patient's history were reviewed and updated as appropriate: allergies, current medications, past family history, past medical history, past social history, past surgical history and problem list.    Depression Screen:  PHQ-2/PHQ-9 Depression Screening 11/26/2019   Little interest or pleasure in doing things 0   Feeling down, depressed, or hopeless 0   Trouble falling or staying asleep, or sleeping too much 0   Feeling tired or having little energy 0   Poor appetite or overeating 0   Feeling bad about yourself - or that you are a failure or have let yourself or your family down 0   Trouble concentrating on things, such as reading  the newspaper or watching television 0   Moving or speaking so slowly that other people could have noticed. Or the opposite - being so fidgety or restless that you have been moving around a lot more than usual 0   Thoughts that you would be better off dead, or of hurting yourself in some way 0   Total Score 0       Past Medical History:   Diagnosis Date   • Abnormal MRI, lumbar spine    • Allergic rhinitis    • Arthritis    • Bilateral hearing loss    • Borderline blood pressure    • BPH (benign prostatic hyperplasia)    • Cancer (CMS/HCC)     PROSTATE   • Chronic back pain     when walking and lying down   • Chronic kidney disease (CKD), stage I    • Cough    • Edema    • Elevated transaminase level    • Erectile dysfunction    • External hemorrhoids    • GERD (gastroesophageal reflux disease)    • High cholesterol    • History of colon polyps    • History of depression    • History of prostate cancer     RADIATION [place]   • Hyperlipidemia    • Hypertension    • Leg cramps    • Low back pain    • Lumbar stenosis    • Muscle spasm    • Prostate cancer (CMS/HCC)    • Renal cyst    • RLS (restless legs syndrome)    • Statin intolerance    • Vitamin D deficiency        Past Surgical History:   Procedure Laterality Date   • CATARACT EXTRACTION Right    • COLONOSCOPY N/A 8/15/2016    Procedure: COLONOSCOPY INTO CECUM WITH COLD SNARE POLYPECTOMY;  Surgeon: Loc Lee MD;  Location: [facility] ENDOSCOPY;  Service:    • HERNIA REPAIR      bilat inguinal   • INSERTION PROSTATE RADIATION SEED     • LUMBAR DISCECTOMY FUSION INSTRUMENTATION N/A 9/23/2016    Procedure: L 4-5 LUMBAR DISCECTOMY FUSION WITH INSTRUMENTATION;  Surgeon: Michael Frias MD;  Location: [facility] OR;  Service:    • PROSTATE BIOPSY      Needle biopsy   • ROTATOR CUFF REPAIR Bilateral     metal in both   • TOTAL SHOULDER ARTHROPLASTY Bilateral    • VASECTOMY         Family History   Problem Relation Age of Onset   • Cancer Father         BRAIN   •  Esophageal cancer Other        Social History     Socioeconomic History   • Marital status:      Spouse name: Not on file   • Number of children: Not on file   • Years of education: 12TH GRADE   • Highest education level: Not on file   Occupational History   • Occupation: RETIRED   Tobacco Use   • Smoking status: Former Smoker     Packs/day: 2.00     Years: 12.00     Pack years: 24.00     Types: Cigarettes     Last attempt to quit:      Years since quittin.4   • Smokeless tobacco: Never Used   Substance and Sexual Activity   • Alcohol use: Yes     Comment: SOCIALLY-Drinks 7 or less drinks per week   • Drug use: No   • Sexual activity: Defer       Current Outpatient Medications   Medication Sig Dispense Refill   • aspirin 81 MG EC tablet Take 81 mg by mouth every morning. STOPPED 16     • furosemide (LASIX) 20 MG tablet Take 1 tablet by mouth Every Morning. 90 tablet 1   • lisinopril (PRINIVIL,ZESTRIL) 10 MG tablet Take 1 tablet by mouth Every Morning. 90 tablet 1   • rOPINIRole (REQUIP) 0.25 MG tablet Take 1 tablet by mouth Daily. Take 1 hour before bedtime. 90 tablet 1     No current facility-administered medications for this visit.        Review of Systems   Constitutional: Negative for activity change, appetite change, fatigue, fever, unexpected weight gain and unexpected weight loss.   HENT: Negative for nosebleeds, rhinorrhea, trouble swallowing and voice change.    Eyes: Negative for visual disturbance.   Respiratory: Negative for cough, chest tightness, shortness of breath and wheezing.    Cardiovascular: Negative for chest pain, palpitations and leg swelling.   Gastrointestinal: Negative for abdominal pain, blood in stool, constipation, diarrhea, nausea, vomiting, GERD and indigestion.   Genitourinary: Negative for dysuria, frequency and hematuria.   Musculoskeletal: Negative for arthralgias, back pain and myalgias.   Skin: Negative for rash and bruise.   Neurological: Negative for  dizziness, tremors, weakness, light-headedness, numbness, headache and memory problem.        Bilateral foot pain and pins and needles.   Hematological: Negative for adenopathy. Does not bruise/bleed easily.   Psychiatric/Behavioral: Negative for sleep disturbance and depressed mood. The patient is not nervous/anxious.        Objective   There were no vitals taken for this visit.    Physical Exam   Constitutional: He appears well-developed and well-nourished. No distress.   Pulmonary/Chest: Effort normal.  No respiratory distress.  Neurological: He is alert.   Skin: He is not diaphoretic.   Psychiatric: He has a normal mood and affect. His behavior is normal. Thought content is normal. He does not express abnormal judgement.       No results found for this or any previous visit (from the past 2016 hour(s)).  Assessment/Plan   Isaac was seen today for hypertension, rls and neuropathy in the feet.    Diagnoses and all orders for this visit:    Essential hypertension  -     lisinopril (PRINIVIL,ZESTRIL) 10 MG tablet; Take 1 tablet by mouth Every Morning.  -     furosemide (LASIX) 20 MG tablet; Take 1 tablet by mouth Every Morning.    Leg cramps  -     rOPINIRole (REQUIP) 0.25 MG tablet; Take 1 tablet by mouth Daily. Take 1 hour before bedtime.  -     EMG & Nerve Conduction Test; Future    Neuropathy  -     EMG & Nerve Conduction Test; Future    Muscle spasm  -     rOPINIRole (REQUIP) 0.25 MG tablet; Take 1 tablet by mouth Daily. Take 1 hour before bedtime.    Edema, unspecified type  -     furosemide (LASIX) 20 MG tablet; Take 1 tablet by mouth Every Morning.    RLS (restless legs syndrome)    Discussed the controlled hypertension and swelling.  Continue the current medications unchanged.  RLS controlled with requip.  No changes in treatment regimen for these.  The foot pain and neuropathic pain is concerning and worsening.  With normal vit b12, vitamin D and folic acid levels will check the nerve conduction studies  to determine if this is peripheral or if central from his back.      Video visit completed.  Time of visit in discussion and care of patient and one-on-one care not including charting time of 25 minutes.

## 2020-07-15 ENCOUNTER — HOSPITAL ENCOUNTER (OUTPATIENT)
Dept: INFUSION THERAPY | Facility: HOSPITAL | Age: 75
Discharge: HOME | End: 2020-07-15
Admitting: PSYCHIATRY & NEUROLOGY

## 2020-07-15 DIAGNOSIS — R25.2 LEG CRAMPS: ICD-10-CM

## 2020-07-15 DIAGNOSIS — G62.9 NEUROPATHY: ICD-10-CM

## 2020-07-15 PROCEDURE — 95886 MUSC TEST DONE W/N TEST COMP: CPT

## 2020-07-15 PROCEDURE — 95909 NRV CNDJ TST 5-6 STUDIES: CPT

## 2020-07-15 PROCEDURE — 95886 MUSC TEST DONE W/N TEST COMP: CPT | Performed by: PSYCHIATRY & NEUROLOGY

## 2020-07-15 PROCEDURE — 95909 NRV CNDJ TST 5-6 STUDIES: CPT | Performed by: PSYCHIATRY & NEUROLOGY

## 2020-07-15 NOTE — PROCEDURES
EMG and Nerve Conduction Studies    I.      Instrument used: Neuromax 1002  II.     Please see data sheets for tabular summary of NCS and details on methods, temperatures and lab standards.   III.    EMG muscles tested for upper extremity studies include the deltoid, biceps, triceps, pronator teres, extensor digitorum communis, first dorsal interosseous and abductor pollicis brevis.    IV.   EMG muscles tested for lower extremity studies include the vastus lateralis, tibialis anterior, peroneus longus, medial gastrocnemius and extensor digitorum brevis.    V.    Additional muscles tested as needed.  Paraspinal muscles tested as needed.   VI.   Please see data sheets for tabular summary of EMG findings.   VII. The complete report includes the data sheets.      Indication: Numbness with needle pains in the feet along with chronic low back pain status post lumbar decompression 4 years ago at L4/5 without a lot of benefit.  He states gabapentin helps some but he is not currently prescribed it.  He describes considerable cramping in the feet and ankles  History: 74-year-old male with history as above.  He denies history of diabetes      Ht: 182.9 cm  Wt: Not reported  HbA1C: No results found for: HGBA1C  TSH: No results found for: TSH    Technical summary:  Nerve conduction studies were obtained in the left leg with 1 comparison on the right.  The feet were extremely cold down around 28 °C at the ankles.  The feet were then warmed but could not always be maintained above 32 °C.  Temperature correction was used if indicated.  Needle examination was obtained on selected muscles in both legs.    Results:  1.  Prolonged left sural sensory latency at 4.5 ms with low amplitude of 4 µV.  2.  Normal left superficial peroneal sensory latency with low amplitude of 2.6 µV.  Absent right superficial peroneal sensory potential.  3.  Slow left peroneal motor velocity below the knee at 27.2 m/s with normal velocity in the short segment  across the fibular head.  Prolonged distal latency with temperature correction at 6.3 ms with very low amplitude of 0.667 mV from ankle stimulation.  4.  Slow left tibial motor velocity at 27.2 m/s with normal distal latency and low amplitude of 1.2 mV from ankle stimulation at 6 cm and difficult to record potential from the popliteal fossa  5.  Needle examination of selected muscles in both legs showed fibrillations and positive sharp waves in the right tibialis anterior and peroneus longus with increased firing rates and reduced interference patterns.  The medial gastrocnemius muscles appear to have much fat replacement and so the lateral gastrocnemius muscles were used instead.  The lateral gastrocnemius muscles and extensor digitorum brevis muscles showed increased insertional activities with fibrillations and positive sharp waves.  There was increased number of large motor units with increased firing rates and reduced interference patterns much worse in the extensor digitorum brevis muscles.  Lumbar paraspinals were not studied due to previous surgery.    Impression:  Abnormal study showing severe peripheral neuropathy with significant axonal loss.  I cannot entirely exclude superimposed radiculopathies primarily right L5 or S1 on either side.  Clinical correlation is suggested.  Study results were discussed with the patient.    Pawan Gomez M.D.              Dictated utilizing Dragon dictation.

## 2020-07-16 DIAGNOSIS — G60.9 IDIOPATHIC PERIPHERAL NEUROPATHY: Primary | ICD-10-CM

## 2020-07-16 RX ORDER — PREGABALIN 75 MG/1
75 CAPSULE ORAL 2 TIMES DAILY
Qty: 60 CAPSULE | Refills: 1 | Status: SHIPPED | OUTPATIENT
Start: 2020-07-16 | End: 2020-08-17

## 2020-08-17 ENCOUNTER — TELEPHONE (OUTPATIENT)
Dept: FAMILY MEDICINE CLINIC | Facility: CLINIC | Age: 75
End: 2020-08-17

## 2020-08-17 ENCOUNTER — OFFICE VISIT (OUTPATIENT)
Dept: FAMILY MEDICINE CLINIC | Facility: CLINIC | Age: 75
End: 2020-08-17

## 2020-08-17 VITALS
TEMPERATURE: 98.3 F | BODY MASS INDEX: 32.78 KG/M2 | DIASTOLIC BLOOD PRESSURE: 80 MMHG | HEIGHT: 72 IN | HEART RATE: 60 BPM | OXYGEN SATURATION: 98 % | WEIGHT: 242 LBS | SYSTOLIC BLOOD PRESSURE: 128 MMHG

## 2020-08-17 DIAGNOSIS — G25.81 RLS (RESTLESS LEGS SYNDROME): ICD-10-CM

## 2020-08-17 DIAGNOSIS — G60.9 IDIOPATHIC PERIPHERAL NEUROPATHY: Primary | ICD-10-CM

## 2020-08-17 PROCEDURE — 99214 OFFICE O/P EST MOD 30 MIN: CPT | Performed by: INTERNAL MEDICINE

## 2020-08-17 RX ORDER — PREGABALIN 150 MG/1
150 CAPSULE ORAL 2 TIMES DAILY
Qty: 60 CAPSULE | Refills: 3 | Status: SHIPPED | OUTPATIENT
Start: 2020-08-17 | End: 2020-11-30 | Stop reason: SDUPTHER

## 2020-08-17 NOTE — TELEPHONE ENCOUNTER
Pt pharmacy ask that you return their call. They want to verify a dose increase on the following medication   pregabalin (LYRICA) 150 MG capsule        Sig: Take 1 capsule by mouth 2 (Two) Times a Day.

## 2020-08-17 NOTE — PROGRESS NOTES
Subjective   Isaac Suarez is a 74 y.o. male.     Chief Complaint   Patient presents with   • Peripheral Neuropathy     med check up       Neurologic Problem   The patient's primary symptoms include clumsiness, focal sensory loss, focal weakness and a loss of balance. The patient's pertinent negatives include no altered mental status, memory loss, near-syncope, slurred speech, syncope, visual change or weakness. This is a recurrent problem. The current episode started more than 1 year ago. The neurological problem developed gradually. The problem has been gradually worsening since onset. There was lower extremity focality noted. Associated symptoms include back pain. Pertinent negatives include no abdominal pain, auditory change, aura, bladder incontinence, bowel incontinence, chest pain, confusion, diaphoresis, dizziness, fatigue, fever, headaches, light-headedness, nausea, neck pain, palpitations, shortness of breath, vertigo or vomiting. Past treatments include medication. The treatment provided mild relief.      Answers for HPI/ROS submitted by the patient on 8/10/2020   Neurologic complaint  What is the primary reason for your visit?: Neurological Problem  Patient states the cramps are much improved and less needle feelings.  Still with issues of balance on uneven surfaces like outside in the yard and garden.  Currently, on the lyrica 75 mg BID and has improved with it but still with the sharp pain.  No fatigue, sleepiness, dizziness, passing out or strange feeling, concentrating well and no increased fogginess.    Patient has undergone EMG testing by Dr. Gomez showing and  abnormal study showing severe peripheral neuropathy with significant axonal loss.  I cannot entirely exclude superimposed radiculopathies primarily right L5 or S1 on either side.  Clinical correlation is suggested.   Has seen podiatry and referred to physical therapy for the balance.    Follow-up for hypertension.  Currently has been  feeling well and asymptomatic without any headaches,vision changes, cough, chest pain, shortness of breath, swelling, focal neurologic deficit, memory loss or syncope.  Has been taking the medications regularly and adherent with the regimen of lisinopril 10 mg and furosemide 20 mg QAM.  Denies medication side effects and no significant interval events.      Follow-up RLS.  Doing well on ropinirole 0.25 mg daily.     The following portions of the patient's history were reviewed and updated as appropriate: allergies, current medications, past family history, past medical history, past social history, past surgical history and problem list.    Depression Screen:  PHQ-2/PHQ-9 Depression Screening 11/26/2019   Little interest or pleasure in doing things 0   Feeling down, depressed, or hopeless 0   Trouble falling or staying asleep, or sleeping too much 0   Feeling tired or having little energy 0   Poor appetite or overeating 0   Feeling bad about yourself - or that you are a failure or have let yourself or your family down 0   Trouble concentrating on things, such as reading the newspaper or watching television 0   Moving or speaking so slowly that other people could have noticed. Or the opposite - being so fidgety or restless that you have been moving around a lot more than usual 0   Thoughts that you would be better off dead, or of hurting yourself in some way 0   Total Score 0       Past Medical History:   Diagnosis Date   • Abnormal MRI, lumbar spine    • Allergic rhinitis    • Arthritis    • Bilateral hearing loss    • Borderline blood pressure    • BPH (benign prostatic hyperplasia)    • Cancer (CMS/HCC)     PROSTATE   • Chronic back pain     when walking and lying down   • Chronic kidney disease (CKD), stage I    • Cough    • Edema    • Elevated transaminase level    • Erectile dysfunction    • External hemorrhoids    • GERD (gastroesophageal reflux disease)    • High cholesterol    • History of colon polyps    •  History of depression    • History of prostate cancer     RADIATION TX   • Hyperlipidemia    • Hypertension    • Leg cramps    • Low back pain    • Lumbar stenosis    • Muscle spasm    • Prostate cancer (CMS/HCC)    • Renal cyst    • RLS (restless legs syndrome)    • Statin intolerance    • Vitamin D deficiency        Past Surgical History:   Procedure Laterality Date   • CATARACT EXTRACTION Right    • COLONOSCOPY N/A 8/15/2016    Procedure: COLONOSCOPY INTO CECUM WITH COLD SNARE POLYPECTOMY;  Surgeon: Loc Lee MD;  Location: [facility] ENDOSCOPY;  Service:    • HERNIA REPAIR      bilat inguinal   • INSERTION PROSTATE RADIATION SEED     • LUMBAR DISCECTOMY FUSION INSTRUMENTATION N/A 2016    Procedure: L 4-5 LUMBAR DISCECTOMY FUSION WITH INSTRUMENTATION;  Surgeon: Michael Frias MD;  Location: [facility] MAIN OR;  Service:    • PROSTATE BIOPSY      Needle biopsy   • ROTATOR CUFF REPAIR Bilateral     metal in both   • TOTAL SHOULDER ARTHROPLASTY Bilateral    • VASECTOMY         Family History   Problem Relation Age of Onset   • Cancer Father         BRAIN   • Esophageal cancer Other        Social History     Socioeconomic History   • Marital status:      Spouse name: Not on file   • Number of children: Not on file   • Years of education: 12TH GRADE   • Highest education level: Not on file   Occupational History   • Occupation: RETIRED   Tobacco Use   • Smoking status: Former Smoker     Packs/day: 2.00     Years: 12.00     Pack years: 24.00     Types: Cigarettes     Last attempt to quit:      Years since quittin.6   • Smokeless tobacco: Never Used   Substance and Sexual Activity   • Alcohol use: Yes     Comment: SOCIALLY-Drinks 7 or less drinks per week   • Drug use: No   • Sexual activity: Defer       Current Outpatient Medications   Medication Sig Dispense Refill   • aspirin 81 MG EC tablet Take 81 mg by mouth every morning. STOPPED 16     • furosemide (LASIX) 20 MG tablet Take 1 tablet  "by mouth Every Morning. 90 tablet 1   • lisinopril (PRINIVIL,ZESTRIL) 10 MG tablet Take 1 tablet by mouth Every Morning. 90 tablet 1   • pregabalin (LYRICA) 150 MG capsule Take 1 capsule by mouth 2 (Two) Times a Day. 60 capsule 3   • rOPINIRole (REQUIP) 0.25 MG tablet Take 1 tablet by mouth Daily. Take 1 hour before bedtime. 90 tablet 1     No current facility-administered medications for this visit.        Review of Systems   Constitutional: Negative for diaphoresis, fatigue and fever.   Respiratory: Negative for shortness of breath.    Cardiovascular: Negative for chest pain, palpitations and near-syncope.   Gastrointestinal: Negative for abdominal pain, bowel incontinence, nausea and vomiting.   Genitourinary: Negative for urinary incontinence.   Musculoskeletal: Positive for back pain. Negative for neck pain.   Neurological: Positive for focal weakness and loss of balance. Negative for dizziness, vertigo, syncope, weakness, light-headedness and confusion.   Psychiatric/Behavioral: Negative for memory loss.       Objective   /80 (BP Location: Left arm, Patient Position: Sitting, Cuff Size: Adult)   Pulse 60   Temp 98.3 °F (36.8 °C) (Temporal)   Ht 182.9 cm (72.01\")   Wt 110 kg (242 lb)   SpO2 98%   BMI 32.81 kg/m²     Physical Exam   Constitutional: He is oriented to person, place, and time. He appears well-developed and well-nourished. No distress.   HENT:   Head: Normocephalic and atraumatic.   Right Ear: External ear normal.   Left Ear: External ear normal.   Nose: Nose normal.   Mouth/Throat: Oropharynx is clear and moist.   Eyes: Pupils are equal, round, and reactive to light. Conjunctivae and EOM are normal.   Neck: Normal range of motion. Neck supple. No tracheal deviation present. No thyromegaly present.   Cardiovascular: Normal rate, regular rhythm, normal heart sounds and intact distal pulses. Exam reveals no gallop and no friction rub.   No murmur heard.  Pulmonary/Chest: Effort normal and " breath sounds normal. No respiratory distress.   Abdominal: Soft. Bowel sounds are normal. He exhibits no mass. There is no tenderness. There is no guarding.   Musculoskeletal: Normal range of motion. He exhibits no edema.   Lymphadenopathy:     He has no cervical adenopathy.   Neurological: He is alert and oriented to person, place, and time. He displays normal reflexes. He exhibits normal muscle tone.   Skin: Skin is warm and dry. Capillary refill takes less than 2 seconds. No rash noted. He is not diaphoretic.   Psychiatric: He has a normal mood and affect. His behavior is normal. Judgment and thought content normal.   Nursing note and vitals reviewed.      No results found for this or any previous visit (from the past 2016 hour(s)).  Assessment/Plan   Isaac was seen today for peripheral neuropathy.    Diagnoses and all orders for this visit:    Idiopathic peripheral neuropathy  -     pregabalin (LYRICA) 150 MG capsule; Take 1 capsule by mouth 2 (Two) Times a Day.    RLS (restless legs syndrome)    Discussed with patient and persistent neuropathic pain and off balance feeling.  I did discuss with him the importance that the physical therapy may make and has balance issues while he is walking.  We will also try to increase his Lyrica 250 mg twice a day to relieve some of his further discomfort.  I did explain to him that this likely will not cause his balance to actually improve with his walking.  RLS is definitely improved continue with the current ropinirole unchanged.

## 2020-11-19 DIAGNOSIS — I10 ESSENTIAL HYPERTENSION: ICD-10-CM

## 2020-11-19 DIAGNOSIS — R60.9 EDEMA, UNSPECIFIED TYPE: ICD-10-CM

## 2020-11-20 RX ORDER — LISINOPRIL 10 MG/1
10 TABLET ORAL EVERY MORNING
Qty: 90 TABLET | Refills: 1 | Status: SHIPPED | OUTPATIENT
Start: 2020-11-20 | End: 2021-05-27 | Stop reason: SDUPTHER

## 2020-11-20 RX ORDER — FUROSEMIDE 20 MG/1
20 TABLET ORAL EVERY MORNING
Qty: 90 TABLET | Refills: 1 | Status: SHIPPED | OUTPATIENT
Start: 2020-11-20 | End: 2021-05-21 | Stop reason: SDUPTHER

## 2020-11-30 DIAGNOSIS — G60.9 IDIOPATHIC PERIPHERAL NEUROPATHY: ICD-10-CM

## 2020-11-30 RX ORDER — PREGABALIN 150 MG/1
150 CAPSULE ORAL 2 TIMES DAILY
Qty: 60 CAPSULE | Refills: 3 | Status: SHIPPED | OUTPATIENT
Start: 2020-11-30 | End: 2020-12-28 | Stop reason: SDUPTHER

## 2020-11-30 NOTE — TELEPHONE ENCOUNTER
Caller: Isaac Suarez    Relationship: Self    Best call back number: [phone]     Medication needed:   Requested Prescriptions     Pending Prescriptions Disp Refills   • pregabalin (LYRICA) 150 MG capsule 60 capsule 3     Sig: Take 1 capsule by mouth 2 (Two) Times a Day.       When do you need the refill by: ASAP    What details did the patient provide when requesting the medication:PATIENT HAS 3 DAYS LEFT.  PATIENT HAS VACATION HOUSE IN [place] AND TRANSFERRED MEDICATION TO RX THERE.  UPON RETURN, LOCAL RX STATED THEY NEED A PRESCRIPTION.    Does the patient have less than a 3 day supply:  [x] Yes  [x] No    What is the patient's preferred pharmacy: [id number] [address] AT [facility] [phone] [facility] [phone]

## 2020-12-28 ENCOUNTER — OFFICE VISIT (OUTPATIENT)
Dept: FAMILY MEDICINE CLINIC | Facility: CLINIC | Age: 75
End: 2020-12-28

## 2020-12-28 VITALS
SYSTOLIC BLOOD PRESSURE: 128 MMHG | WEIGHT: 269.2 LBS | OXYGEN SATURATION: 97 % | HEIGHT: 72 IN | BODY MASS INDEX: 36.46 KG/M2 | DIASTOLIC BLOOD PRESSURE: 82 MMHG | TEMPERATURE: 97.3 F | HEART RATE: 58 BPM

## 2020-12-28 DIAGNOSIS — Z00.00 MEDICARE ANNUAL WELLNESS VISIT, SUBSEQUENT: Primary | ICD-10-CM

## 2020-12-28 DIAGNOSIS — I10 ESSENTIAL HYPERTENSION: ICD-10-CM

## 2020-12-28 DIAGNOSIS — E78.5 HYPERLIPIDEMIA, UNSPECIFIED HYPERLIPIDEMIA TYPE: ICD-10-CM

## 2020-12-28 DIAGNOSIS — G60.9 IDIOPATHIC PERIPHERAL NEUROPATHY: ICD-10-CM

## 2020-12-28 PROCEDURE — G0439 PPPS, SUBSEQ VISIT: HCPCS | Performed by: INTERNAL MEDICINE

## 2020-12-28 RX ORDER — PREGABALIN 150 MG/1
150 CAPSULE ORAL 2 TIMES DAILY
Qty: 180 CAPSULE | Refills: 1 | Status: SHIPPED | OUTPATIENT
Start: 2020-12-28 | End: 2021-06-28 | Stop reason: SDUPTHER

## 2020-12-28 NOTE — PROGRESS NOTES
Subsequent Medicare Wellness Visit   The ABC's of the Annual Wellness Visit    Chief Complaint   Patient presents with   • Annual Exam       HPI:  Isaac Suarez, -1945, is a 75 y.o. male who presents for a Subsequent Medicare Wellness Visit.    Patient states the cramps are much improved and no cramps for several months.  Still with issues of balance on uneven surfaces like outside in the yard and garden when gets up.  Currently, on the lyrica 150 mg BID and has improved with it and the sharp pain much better.  No fatigue, sleepiness, dizziness, passing out or strange feeling, concentrating well and no increased fogginess.     Patient has undergone EMG testing by Dr. Gomez showing and  abnormal study showing severe peripheral neuropathy with significant axonal loss.  I cannot entirely exclude superimposed radiculopathies primarily right L5 or S1 on either side.  Clinical correlation is suggested.   Has seen podiatry and referred to physical therapy for the balance.  Doing much better on the lyrica 150 mg BID.     Follow-up for hypertension.  Currently, has been feeling well and asymptomatic without any headaches,vision changes, cough, chest pain, shortness of breath, swelling, focal neurologic deficit, memory loss or syncope.  Has been taking the medications regularly and adherent with the regimen of lisinopril 10 mg and furosemide 20 mg QAM.  Denies medication side effects and no significant interval events.      Follow-up RLS.  Doing well on ropinirole 0.25 mg daily.     Recent Hospitalizations:  No hospitalization(s) within the last year..    Current Medical Providers:  Patient Care Team:  Guillermo Moran MD as PCP - General (Internal Medicine)  Tyrel Molina MD (Inactive) as Consulting Physician (Pain Medicine)  Edward Starkey MD as Surgeon (Orthopedic Surgery)  Sue Fraga DPM as Consulting Physician (Podiatry)  Adam Cunningham MD as Consulting Physician  (Urology)    Health Habits and Functional and Cognitive Screening and Depression Screening:  Functional & Cognitive Status 12/28/2020   Do you have difficulty preparing food and eating? No   Do you have difficulty bathing yourself, getting dressed or grooming yourself? No   Do you have difficulty using the toilet? No   Do you have difficulty moving around from place to place? No   Do you have trouble with steps or getting out of a bed or a chair? No   Current Diet Well Balanced Diet   Dental Exam Up to date   Eye Exam Up to date   Exercise (times per week) 0 times per week   Do you need help using the phone?  No   Are you deaf or do you have serious difficulty hearing?  Yes   Do you need help with transportation? No   Do you need help shopping? No   Do you need help preparing meals?  No   Do you need help with housework?  No   Do you need help with laundry? No   Do you need help taking your medications? No   Do you need help managing money? No   Do you ever drive or ride in a car without wearing a seat belt? No   Have you felt unusual stress, anger or loneliness in the last month? No   Who do you live with? Spouse   If you need help, do you have trouble finding someone available to you? No   Have you been bothered in the last four weeks by sexual problems? No   Do you have difficulty concentrating, remembering or making decisions? No       Compared to one year ago, the patient feels his physical health is the same and his mental health is the same.    Depression Screen:  PHQ-2/PHQ-9 Depression Screening 12/28/2020   Little interest or pleasure in doing things 0   Feeling down, depressed, or hopeless 0   Trouble falling or staying asleep, or sleeping too much 0   Feeling tired or having little energy 0   Poor appetite or overeating 0   Feeling bad about yourself - or that you are a failure or have let yourself or your family down 0   Trouble concentrating on things, such as reading the newspaper or watching  television 0   Moving or speaking so slowly that other people could have noticed. Or the opposite - being so fidgety or restless that you have been moving around a lot more than usual 0   Thoughts that you would be better off dead, or of hurting yourself in some way -   Total Score 0       Falls Risk Assessment:  ROBERT Fall Risk Clinician Key Questions   Have you fallen in the past year?: No  Do you feel unsteady with walking?: Yes    Past Medical/Family/Social History:  The following portions of the patient's history were reviewed and updated as appropriate: allergies, current medications, past family history, past medical history, past social history, past surgical history and problem list.    Allergies   Allergen Reactions   • Crestor [Rosuvastatin] Myalgia   • Gabapentin Other (See Comments)     nightmares   • Penicillins Hives     HIVES   • Zetia [Ezetimibe] Myalgia   • Zocor [Simvastatin] Myalgia       Current Outpatient Medications:   •  aspirin 81 MG EC tablet, Take 81 mg by mouth every morning. STOPPED 9/12/16, Disp: , Rfl:   •  furosemide (LASIX) 20 MG tablet, TAKE 1 TABLET BY MOUTH EVERY MORNING, Disp: 90 tablet, Rfl: 1  •  lisinopril (PRINIVIL,ZESTRIL) 10 MG tablet, TAKE 1 TABLET BY MOUTH EVERY MORNING, Disp: 90 tablet, Rfl: 1  •  pregabalin (LYRICA) 150 MG capsule, Take 1 capsule by mouth 2 (Two) Times a Day., Disp: 180 capsule, Rfl: 1  •  rOPINIRole (REQUIP) 0.25 MG tablet, Take 1 tablet by mouth Daily. Take 1 hour before bedtime., Disp: 90 tablet, Rfl: 1    Aspirin use counseling: Taking ASA appropriately as indicated    Current medication list contains no high risk medications.  No harmful drug interactions have been identified.     Family History   Problem Relation Age of Onset   • Cancer Father         BRAIN   • Esophageal cancer Other        Social History     Tobacco Use   • Smoking status: Former Smoker     Packs/day: 2.00     Years: 12.00     Pack years: 24.00     Types: Cigarettes     Quit  date:      Years since quittin.0   • Smokeless tobacco: Never Used   Substance Use Topics   • Alcohol use: Yes     Comment: SOCIALLY-Drinks 7 or less drinks per week       Past Surgical History:   Procedure Laterality Date   • CATARACT EXTRACTION Right    • COLONOSCOPY N/A 8/15/2016    Procedure: COLONOSCOPY INTO CECUM WITH COLD SNARE POLYPECTOMY;  Surgeon: Loc Lee MD;  Location: [facility] ENDOSCOPY;  Service:    • HERNIA REPAIR      bilat inguinal   • INSERTION PROSTATE RADIATION SEED     • LUMBAR DISCECTOMY FUSION INSTRUMENTATION N/A 2016    Procedure: L 4-5 LUMBAR DISCECTOMY FUSION WITH INSTRUMENTATION;  Surgeon: Michael Frias MD;  Location: [facility] MAIN OR;  Service:    • PROSTATE BIOPSY      Needle biopsy   • ROTATOR CUFF REPAIR Bilateral     metal in both   • TOTAL SHOULDER ARTHROPLASTY Bilateral    • VASECTOMY         Patient Active Problem List   Diagnosis   • Chronic midline low back pain without sciatica   • Bilateral sacroiliitis (CMS/HCC)   • Hypertension   • Hyperlipidemia   • Leg cramps   • Elevated transaminase level   • Medicare annual wellness visit, subsequent   • Encounter for hepatitis C screening test for low risk patient   • Vitamin D deficiency   • Muscle spasm   • RLS (restless legs syndrome)   • S/P lumbar spinal fusion   • S/P shoulder replacement   • Idiopathic peripheral neuropathy       Review of Systems   Constitutional: Negative for activity change, appetite change, fatigue, fever, unexpected weight gain and unexpected weight loss.   HENT: Negative for nosebleeds, rhinorrhea, trouble swallowing and voice change.    Eyes: Negative for visual disturbance.   Respiratory: Negative for cough, chest tightness, shortness of breath and wheezing.    Cardiovascular: Negative for chest pain, palpitations and leg swelling.   Gastrointestinal: Negative for abdominal pain, blood in stool, constipation, diarrhea, nausea, vomiting, GERD and indigestion.   Genitourinary: Negative  "for dysuria, frequency and hematuria.   Musculoskeletal: Positive for back pain. Negative for arthralgias and myalgias.   Skin: Negative for rash and bruise.   Neurological: Positive for weakness. Negative for dizziness, tremors, light-headedness, numbness, headache and memory problem.        Loss of balance   Hematological: Negative for adenopathy. Does not bruise/bleed easily.   Psychiatric/Behavioral: Negative for sleep disturbance and depressed mood. The patient is not nervous/anxious.        Objective     Vitals:    12/28/20 0950   BP: 128/82   BP Location: Left arm   Patient Position: Sitting   Cuff Size: Adult   Pulse: 58   Temp: 97.3 °F (36.3 °C)   TempSrc: Temporal   SpO2: 97%   Weight: 122 kg (269 lb 3.2 oz)   Height: 182.9 cm (72.01\")       Patient's Body mass index is 36.5 kg/m². BMI is above normal parameters. Recommendations include: exercise counseling and nutrition counseling.      No exam data present    The patient has no evidence of cognitve impairment.     Physical Exam  Vitals signs and nursing note reviewed.   Constitutional:       General: He is not in acute distress.     Appearance: He is well-developed. He is not diaphoretic.   HENT:      Head: Normocephalic and atraumatic.      Right Ear: External ear normal.      Left Ear: External ear normal.      Nose: Nose normal.   Eyes:      Conjunctiva/sclera: Conjunctivae normal.      Pupils: Pupils are equal, round, and reactive to light.   Neck:      Musculoskeletal: Normal range of motion and neck supple.      Thyroid: No thyromegaly.      Trachea: No tracheal deviation.   Cardiovascular:      Rate and Rhythm: Normal rate and regular rhythm.      Heart sounds: Normal heart sounds. No murmur. No friction rub. No gallop.    Pulmonary:      Effort: Pulmonary effort is normal. No respiratory distress.      Breath sounds: Normal breath sounds.   Abdominal:      General: Bowel sounds are normal.      Palpations: Abdomen is soft. There is no mass.      " Tenderness: There is no abdominal tenderness. There is no guarding.   Musculoskeletal: Normal range of motion.   Lymphadenopathy:      Cervical: No cervical adenopathy.   Skin:     General: Skin is warm and dry.      Capillary Refill: Capillary refill takes less than 2 seconds.      Findings: No rash.   Neurological:      Mental Status: He is alert and oriented to person, place, and time.      Motor: No abnormal muscle tone.      Deep Tendon Reflexes: Reflexes normal.   Psychiatric:         Behavior: Behavior normal.         Thought Content: Thought content normal.         Judgment: Judgment normal.         Recent Lab Results:  Lab Results   Component Value Date     (H) 08/21/2019          Assessment/Plan   Age-appropriate Screening Schedule:  Refer to the list below for future screening recommendations based on patient's age, sex and/or medical conditions.      Health Maintenance   Topic Date Due   • TDAP/TD VACCINES (1 - Tdap) 09/23/1965   • ZOSTER VACCINE (1 of 2) 09/23/1996   • COLONOSCOPY  08/15/2026   • INFLUENZA VACCINE  Completed   • LIPID PANEL  Discontinued       Medicare Risks and Personalized Health Plan:  Advance Directive Discussion  Fall Risk  Glaucoma Risk  Immunizations Discussed/Encouraged (specific immunizations; adacel Tdap and Shingrix )  Obesity/Overweight       CMS-Preventive Services Quick Reference  Medicare Preventive Services Addressed:  Annual Wellness Visit (AWV)  Glaucoma screening (for individuals with diabetes mellitus, family history of glaucoma, -Americans (> or =) age 50, -Americans (> or =) age 65)    Advance Care Planning:  ACP discussion was held with the patient during this visit. Patient does not have an advance directive, information provided.  Diagnoses and all orders for this visit:    1. Medicare annual wellness visit, subsequent (Primary)    2. Essential hypertension  -     Comprehensive Metabolic Panel  -     Lipid Panel    3. Hyperlipidemia,  unspecified hyperlipidemia type  -     Lipid Panel    4. Idiopathic peripheral neuropathy  -     pregabalin (LYRICA) 150 MG capsule; Take 1 capsule by mouth 2 (Two) Times a Day.  Dispense: 180 capsule; Refill: 1      Annual wellness visit reviewed with patient.  All past history, medications, social history, and problem list were reviewed.  Discussed advanced directives and living will.  Patient has living will: Living will: no and information packet given to patient to complete at home and bring copy to office.  Discussed fall risk and precautions encourage removing throw rugs and using grab bars within the home and bathroom.  Will check the labs as ordered above to evaluate the blood sugars, kidney, liver, cholesterol for screening.  Discussed flu shot recommended to get the high-dose influenza vaccine annually in the fall.  Prevnar-13 and pneumovax-23 up to date and appropriate.  Shingrix vaccination series recommended.  Encourage follow-up with the eye doctor on annual basis for glaucoma evaluation.  Discussed weight and encouraged exercise as tolerated while following a healthy diet.  Colon cancer screening discussed and current status: colonoscopy completed 8/15/16.  Discussed prostate screening for which he is currently seeing urology.  Follow up with current specialists as needed.    Doing well with controlled hypertension.  Will check the labs as ordered.  Neuropathy and RLS are doing much better.  No changes are needed at this time.  Discussed the hearing and he does have hearing aids but is afraid to wear them outside with fear of loosing them.    NEHA run and reviewed.  Risks of the medication Lyrica include but are not limited to fatigue, somnolence, increased risk of falls, constipation, allergic reaction, dependence, and addiction.      An After Visit Summary and PPPS with all of these plans were given to the patient.      Follow Up:  Return in about 6 months (around 6/28/2021) for Next scheduled  follow up.

## 2020-12-28 NOTE — PATIENT INSTRUCTIONS
Medicare Wellness  Personal Prevention Plan of Service     Date of Office Visit:  2020  Encounter Provider:  Guillermo Moran MD  Place of Service:  [facility]  Patient Name: Isaac Suarez  :  1945    As part of the Medicare Wellness portion of your visit today, we are providing you with this personalized preventive plan of services (PPPS). This plan is based upon recommendations of the [place] Preventive Services Task Force (USPSTF) and the Advisory Committee on Immunization Practices (ACIP).    This lists the preventive care services that should be considered, and provides dates of when you are due. Items listed as completed are up-to-date and do not require any further intervention.    Health Maintenance   Topic Date Due   • TDAP/TD VACCINES (1 - Tdap) 1965   • ZOSTER VACCINE (1 of 2) 1996   • AAA SCREEN (ONE-TIME)  2019   • ANNUAL WELLNESS VISIT  2020   • COLONOSCOPY  08/15/2026   • HEPATITIS C SCREENING  Completed   • INFLUENZA VACCINE  Completed   • Pneumococcal Vaccine 65+  Completed   • LIPID PANEL  Discontinued       Orders Placed This Encounter   Procedures   • Comprehensive Metabolic Panel   • Lipid Panel       Return in about 6 months (around 2021) for Next scheduled follow up.

## 2020-12-29 LAB
ALBUMIN SERPL-MCNC: 4.3 G/DL (ref 3.5–5.2)
ALBUMIN/GLOB SERPL: 1.6 G/DL
ALP SERPL-CCNC: 69 U/L (ref 39–117)
ALT SERPL-CCNC: 18 U/L (ref 1–41)
AST SERPL-CCNC: 21 U/L (ref 1–40)
BILIRUB SERPL-MCNC: 0.3 MG/DL (ref 0–1.2)
BUN SERPL-MCNC: 30 MG/DL (ref 8–23)
BUN/CREAT SERPL: 20.8 (ref 7–25)
CALCIUM SERPL-MCNC: 10.1 MG/DL (ref 8.6–10.5)
CHLORIDE SERPL-SCNC: 108 MMOL/L (ref 98–107)
CHOLEST SERPL-MCNC: 319 MG/DL (ref 0–200)
CO2 SERPL-SCNC: 25.2 MMOL/L (ref 22–29)
CREAT SERPL-MCNC: 1.44 MG/DL (ref 0.76–1.27)
GLOBULIN SER CALC-MCNC: 2.7 GM/DL
GLUCOSE SERPL-MCNC: 100 MG/DL (ref 65–99)
HDLC SERPL-MCNC: 46 MG/DL (ref 40–60)
LDLC SERPL CALC-MCNC: 227 MG/DL (ref 0–100)
POTASSIUM SERPL-SCNC: 5.4 MMOL/L (ref 3.5–5.2)
PROT SERPL-MCNC: 7 G/DL (ref 6–8.5)
SODIUM SERPL-SCNC: 143 MMOL/L (ref 136–145)
TRIGL SERPL-MCNC: 227 MG/DL (ref 0–150)
VLDLC SERPL CALC-MCNC: 46 MG/DL (ref 5–40)

## 2020-12-30 DIAGNOSIS — E87.5 HYPERKALEMIA: ICD-10-CM

## 2020-12-30 DIAGNOSIS — E78.5 HYPERLIPIDEMIA, UNSPECIFIED HYPERLIPIDEMIA TYPE: Primary | ICD-10-CM

## 2020-12-30 RX ORDER — BEMPEDOIC ACID 180 MG/1
1 TABLET, FILM COATED ORAL DAILY
Qty: 30 TABLET | Refills: 3 | Status: ON HOLD | OUTPATIENT
Start: 2020-12-30 | End: 2021-08-06 | Stop reason: SDUPTHER

## 2021-01-04 ENCOUNTER — TELEPHONE (OUTPATIENT)
Dept: FAMILY MEDICINE CLINIC | Facility: CLINIC | Age: 76
End: 2021-01-04

## 2021-01-04 DIAGNOSIS — E78.5 HYPERLIPIDEMIA, UNSPECIFIED HYPERLIPIDEMIA TYPE: Primary | ICD-10-CM

## 2021-01-04 RX ORDER — EVOLOCUMAB 140 MG/ML
140 INJECTION, SOLUTION SUBCUTANEOUS
Qty: 2 ML | Refills: 5 | Status: ON HOLD | OUTPATIENT
Start: 2021-01-04 | End: 2021-08-06

## 2021-01-04 NOTE — TELEPHONE ENCOUNTER
Please call patient inform him of the insurances decision.  See if he would like to try the Repatha injection which is once every 2 weeks and see if the insurance will cover that.  It is not a statin and I do not believe he has had this 1 before for anything in this family.

## 2021-01-04 NOTE — TELEPHONE ENCOUNTER
Patient is calling to check the status of PA for Nexletol RX.   Patientt states the pharmacy told him they are waiting for the PA.    Please advise.    Patient call back [phone]     [id number] - [place], [place] - [address] AT [facility] - [phone] [facility] [phone]   [phone]

## 2021-01-06 ENCOUNTER — TELEPHONE (OUTPATIENT)
Dept: FAMILY MEDICINE CLINIC | Facility: CLINIC | Age: 76
End: 2021-01-06

## 2021-01-06 NOTE — TELEPHONE ENCOUNTER
I have spoke with patients insurance to appeal the Nexletol due to it being denied and also the repatha being denied because it is not being written by a specialist. I am not sure what to do from here. Please advise.

## 2021-01-06 NOTE — TELEPHONE ENCOUNTER
LM about medication PA. Insurance will not fill covered meds because it is not being prescribed by a specialist. Dr. Moran and I are going to work on this in the meantime. I did apeal the first medication we tried Nexletol since the Repatha has to be prescribed by a specialist

## 2021-01-06 NOTE — TELEPHONE ENCOUNTER
Unfortunately they denied the Nexletol saying that he needs to be on Repatha then denied Repatha saying the need to get it from the cardiologist.  We can appeal the Nexletol now I guess and see if they will go through.  Please inform patient.

## 2021-01-06 NOTE — TELEPHONE ENCOUNTER
Caller: Isaac Suarez    Relationship to patient: Self    Best call back number: [phone]    Patient is needing:PATIENT IS CALLING TO GET HIS  REPATHA MEDICATION IT NEEDS A PRIOR AUTHORIZATION.       . PATIENT IS USING [id number] [address] AT [facility] [phone] [facility] [phone] [employer]         PATIENT IS GOING OUT OF TOWN TOMORROW.

## 2021-02-17 ENCOUNTER — TELEPHONE (OUTPATIENT)
Dept: FAMILY MEDICINE CLINIC | Facility: CLINIC | Age: 76
End: 2021-02-17

## 2021-03-09 DIAGNOSIS — Z23 IMMUNIZATION DUE: ICD-10-CM

## 2021-05-04 ENCOUNTER — OFFICE VISIT (OUTPATIENT)
Dept: FAMILY MEDICINE CLINIC | Facility: CLINIC | Age: 76
End: 2021-05-04

## 2021-05-04 VITALS
TEMPERATURE: 97.7 F | WEIGHT: 271 LBS | HEIGHT: 72 IN | SYSTOLIC BLOOD PRESSURE: 126 MMHG | BODY MASS INDEX: 36.7 KG/M2 | OXYGEN SATURATION: 97 % | DIASTOLIC BLOOD PRESSURE: 84 MMHG | HEART RATE: 67 BPM

## 2021-05-04 DIAGNOSIS — M54.16 LUMBAR RADICULOPATHY: ICD-10-CM

## 2021-05-04 DIAGNOSIS — G89.29 CHRONIC RIGHT-SIDED LOW BACK PAIN WITH RIGHT-SIDED SCIATICA: Primary | ICD-10-CM

## 2021-05-04 DIAGNOSIS — M54.41 CHRONIC RIGHT-SIDED LOW BACK PAIN WITH RIGHT-SIDED SCIATICA: Primary | ICD-10-CM

## 2021-05-04 PROCEDURE — 99214 OFFICE O/P EST MOD 30 MIN: CPT | Performed by: INTERNAL MEDICINE

## 2021-05-20 ENCOUNTER — TELEPHONE (OUTPATIENT)
Dept: FAMILY MEDICINE CLINIC | Facility: CLINIC | Age: 76
End: 2021-05-20

## 2021-05-20 NOTE — TELEPHONE ENCOUNTER
Caller: Isaac Suarez    Relationship: Self    Best call back number: [phone]    What is the best time to reach you: ANY    What was the call regarding: PATIENT WOULD LIKE A CALLBACK TO SCHEDULE HIS MRI, HE HAS NOT HEARD FROM ANYONE. PLEASE CALL TO ADVISE.     Do you require a callback: YES

## 2021-05-21 ENCOUNTER — TELEPHONE (OUTPATIENT)
Dept: FAMILY MEDICINE CLINIC | Facility: CLINIC | Age: 76
End: 2021-05-21

## 2021-05-21 DIAGNOSIS — R60.9 EDEMA, UNSPECIFIED TYPE: ICD-10-CM

## 2021-05-21 DIAGNOSIS — I10 ESSENTIAL HYPERTENSION: ICD-10-CM

## 2021-05-21 NOTE — TELEPHONE ENCOUNTER
Pt is requesting a refill on the following medication,       furosemide (LASIX) 20 MG tablet        Sig: TAKE 1 TABLET BY MOUTH EVERY MORNING        Sent to pharmacy as: Furosemide 20 MG Oral Tablet (LASIX)                Viviana [phone]

## 2021-05-24 RX ORDER — FUROSEMIDE 20 MG/1
20 TABLET ORAL EVERY MORNING
Qty: 90 TABLET | Refills: 1 | Status: SHIPPED | OUTPATIENT
Start: 2021-05-24 | End: 2021-11-17

## 2021-05-25 DIAGNOSIS — M54.16 LUMBAR RADICULOPATHY: ICD-10-CM

## 2021-05-25 DIAGNOSIS — G89.29 CHRONIC RIGHT-SIDED LOW BACK PAIN WITH RIGHT-SIDED SCIATICA: ICD-10-CM

## 2021-05-25 DIAGNOSIS — M54.41 CHRONIC RIGHT-SIDED LOW BACK PAIN WITH RIGHT-SIDED SCIATICA: ICD-10-CM

## 2021-05-27 DIAGNOSIS — M48.062 SPINAL STENOSIS OF LUMBAR REGION WITH NEUROGENIC CLAUDICATION: Primary | ICD-10-CM

## 2021-05-27 DIAGNOSIS — I10 ESSENTIAL HYPERTENSION: ICD-10-CM

## 2021-05-27 RX ORDER — METHYLPREDNISOLONE 4 MG/1
TABLET ORAL
Qty: 21 TABLET | Refills: 0 | Status: SHIPPED | OUTPATIENT
Start: 2021-05-27 | End: 2021-07-27

## 2021-05-27 RX ORDER — LISINOPRIL 10 MG/1
10 TABLET ORAL EVERY MORNING
Qty: 90 TABLET | Refills: 1 | Status: SHIPPED | OUTPATIENT
Start: 2021-05-27 | End: 2021-11-17

## 2021-05-27 NOTE — TELEPHONE ENCOUNTER
Patient called to check on the status of his lisinopril refill.  He said he got a email message from [facility] [employer] [facility] [address] that it needed authorization.  His number is [phone].  Also, he said he missed a call from someone at the office, and he thinks it might have been from Judy.  I didn't see any messages in the chart.

## 2021-06-28 ENCOUNTER — OFFICE VISIT (OUTPATIENT)
Dept: FAMILY MEDICINE CLINIC | Facility: CLINIC | Age: 76
End: 2021-06-28

## 2021-06-28 VITALS
HEART RATE: 62 BPM | OXYGEN SATURATION: 98 % | TEMPERATURE: 98.6 F | BODY MASS INDEX: 37.93 KG/M2 | HEIGHT: 72 IN | DIASTOLIC BLOOD PRESSURE: 86 MMHG | SYSTOLIC BLOOD PRESSURE: 136 MMHG | WEIGHT: 280 LBS

## 2021-06-28 DIAGNOSIS — G89.29 CHRONIC MIDLINE LOW BACK PAIN WITHOUT SCIATICA: Primary | ICD-10-CM

## 2021-06-28 DIAGNOSIS — M48.062 SPINAL STENOSIS OF LUMBAR REGION WITH NEUROGENIC CLAUDICATION: ICD-10-CM

## 2021-06-28 DIAGNOSIS — G60.9 IDIOPATHIC PERIPHERAL NEUROPATHY: ICD-10-CM

## 2021-06-28 DIAGNOSIS — Z98.1 S/P LUMBAR SPINAL FUSION: ICD-10-CM

## 2021-06-28 DIAGNOSIS — M54.50 CHRONIC MIDLINE LOW BACK PAIN WITHOUT SCIATICA: Primary | ICD-10-CM

## 2021-06-28 PROCEDURE — 99214 OFFICE O/P EST MOD 30 MIN: CPT | Performed by: INTERNAL MEDICINE

## 2021-06-28 RX ORDER — PREGABALIN 150 MG/1
150 CAPSULE ORAL 2 TIMES DAILY
Qty: 180 CAPSULE | Refills: 1 | Status: SHIPPED | OUTPATIENT
Start: 2021-06-28 | End: 2021-12-29 | Stop reason: SDUPTHER

## 2021-06-28 NOTE — PROGRESS NOTES
Subjective   Isaac Suarez is a 75 y.o. male.     Chief Complaint   Patient presents with   • peripheral neuropathy     med refill   • Back Pain     discussion MRi results       History of Present Illness   Here for follow-up with low back pain that radiates down the right leg with weakness in the right leg in the past and intermittent numbness.  Had been seen by Dr. Frias 6 years ago and has had EMG with Dr. Gomez showing abnormal study slowing severe peripheral neuropathy with significant axonal loss.  MRI on 10/9/2017 showed degenerative disc disease L4-L5 fusion.  Having difficulty walking secondary to pain and can walk only approximately 50 yards.  Seen by pain management at [facility] pain management in distant past and underwent radiofrequency ablation which did help some.  Has not been seen for 6 years.  MRI was ordered that showed protruding disc at the L3-L4 level with a moderate central canal stenosis and some arthritis with swelling on both sides of the disc causing narrowing of the space that the nerve exits the spine on both sides.  Discussed this after he received his MRI and he tried a course of oral steroids.  The steroids did help calm it down with the pain at night but still with pain with walking.  Is already currently on Lyrica 150 mg twice a day and has only about 15 pills remaining.    Follow-up for hypertension.  Currently, has been feeling well and asymptomatic without any headaches, vision changes, cough, chest pain, shortness of breath, swelling, focal neurologic deficit, memory loss or syncope.  Has been taking the medications regularly and adherent with the regimen of furosemide 20 mg in the morning and lisinopril 10 mg daily.  Denies medication side effects and no significant interval events.      Follow-up for cholesterol.  Currently, has been feeling well without any myalgias, muscle aches, weakness, numbness, chest pain, short of breath or other issues.  Currently, is adherent with  medication regimen of Nexletol 180 mg daily and Repatha every 14 days and denies medication side effects. Is due for lab follow-up.      The following portions of the patient's history were reviewed and updated as appropriate: allergies, current medications, past family history, past medical history, past social history, past surgical history and problem list.    Depression Screen:  PHQ-2/PHQ-9 Depression Screening 6/28/2021   Little interest or pleasure in doing things 0   Feeling down, depressed, or hopeless 0   Trouble falling or staying asleep, or sleeping too much -   Feeling tired or having little energy -   Poor appetite or overeating -   Feeling bad about yourself - or that you are a failure or have let yourself or your family down -   Trouble concentrating on things, such as reading the newspaper or watching television -   Moving or speaking so slowly that other people could have noticed. Or the opposite - being so fidgety or restless that you have been moving around a lot more than usual -   Thoughts that you would be better off dead, or of hurting yourself in some way -   Total Score 0       Past Medical History:   Diagnosis Date   • Abnormal MRI, lumbar spine    • Allergic rhinitis    • Arthritis    • Bilateral hearing loss    • Borderline blood pressure    • BPH (benign prostatic hyperplasia)    • Cancer (CMS/HCC)     PROSTATE   • Chronic back pain     when walking and lying down   • Chronic kidney disease (CKD), stage I    • Cough    • Edema    • Elevated transaminase level    • Erectile dysfunction    • External hemorrhoids    • GERD (gastroesophageal reflux disease)    • High cholesterol    • History of colon polyps    • History of depression    • History of prostate cancer     RADIATION TX   • Hyperlipidemia    • Hypertension    • Leg cramps    • Low back pain    • Lumbar stenosis    • Muscle spasm    • Prostate cancer (CMS/HCC)    • Renal cyst    • RLS (restless legs syndrome)    • Statin  intolerance    • Vitamin D deficiency        Past Surgical History:   Procedure Laterality Date   • CATARACT EXTRACTION Right    • COLONOSCOPY N/A 8/15/2016    Procedure: COLONOSCOPY INTO CECUM WITH COLD SNARE POLYPECTOMY;  Surgeon: Loc Lee MD;  Location: [facility] ENDOSCOPY;  Service:    • HERNIA REPAIR      bilat inguinal   • INSERTION PROSTATE RADIATION SEED     • LUMBAR DISCECTOMY FUSION INSTRUMENTATION N/A 2016    Procedure: L 4-5 LUMBAR DISCECTOMY FUSION WITH INSTRUMENTATION;  Surgeon: Michael Frias MD;  Location: [facility] MAIN OR;  Service:    • PROSTATE BIOPSY      Needle biopsy   • ROTATOR CUFF REPAIR Bilateral     metal in both   • TOTAL SHOULDER ARTHROPLASTY Bilateral    • VASECTOMY         Family History   Problem Relation Age of Onset   • Cancer Father         BRAIN   • Esophageal cancer Other        Social History     Socioeconomic History   • Marital status:      Spouse name: Not on file   • Number of children: Not on file   • Years of education: 12TH GRADE   • Highest education level: Not on file   Tobacco Use   • Smoking status: Former Smoker     Packs/day: 2.00     Years: 12.00     Pack years: 24.00     Types: Cigarettes     Quit date:      Years since quittin.5   • Smokeless tobacco: Never Used   Substance and Sexual Activity   • Alcohol use: Yes     Comment: SOCIALLY-Drinks 7 or less drinks per week   • Drug use: No   • Sexual activity: Defer       Current Outpatient Medications   Medication Sig Dispense Refill   • aspirin 81 MG EC tablet Take 81 mg by mouth every morning. STOPPED 16     • furosemide (LASIX) 20 MG tablet Take 1 tablet by mouth Every Morning. 90 tablet 1   • lisinopril (PRINIVIL,ZESTRIL) 10 MG tablet Take 1 tablet by mouth Every Morning. 90 tablet 1   • pregabalin (LYRICA) 150 MG capsule Take 1 capsule by mouth 2 (Two) Times a Day. 180 capsule 1   • Bempedoic Acid (Nexletol) 180 MG tablet Take 1 tablet by mouth Daily. 30 tablet 3   • Evolocumab  "(Repatha) solution prefilled syringe injection Inject 1 mL under the skin into the appropriate area as directed Every 14 (Fourteen) Days. 2 mL 5   • methylPREDNISolone (MEDROL) 4 MG dose pack Take as directed on package instructions. 21 tablet 0   • rOPINIRole (REQUIP) 0.25 MG tablet Take 1 tablet by mouth Daily. Take 1 hour before bedtime. 90 tablet 1     No current facility-administered medications for this visit.       Review of Systems   Constitutional: Negative for activity change, appetite change, fatigue, fever, unexpected weight gain and unexpected weight loss.   HENT: Negative for nosebleeds, rhinorrhea, trouble swallowing and voice change.    Eyes: Negative for visual disturbance.   Respiratory: Negative for cough, chest tightness, shortness of breath and wheezing.    Cardiovascular: Negative for chest pain, palpitations and leg swelling.   Gastrointestinal: Negative for abdominal pain, blood in stool, constipation, diarrhea, nausea, vomiting, GERD and indigestion.   Genitourinary: Negative for dysuria, frequency and hematuria.   Musculoskeletal: Positive for back pain and gait problem. Negative for arthralgias and myalgias.        Right sciatica   Skin: Negative for rash and bruise.   Neurological: Positive for weakness and numbness. Negative for dizziness, tremors, light-headedness, headache and memory problem.   Hematological: Negative for adenopathy. Does not bruise/bleed easily.   Psychiatric/Behavioral: Negative for sleep disturbance and depressed mood. The patient is not nervous/anxious.        Objective   /86 (BP Location: Left arm, Patient Position: Sitting, Cuff Size: Adult)   Pulse 62   Temp 98.6 °F (37 °C) (Infrared)   Ht 182.9 cm (72.01\")   Wt 127 kg (280 lb)   SpO2 98%   BMI 37.96 kg/m²     Physical Exam  Vitals and nursing note reviewed.   Constitutional:       General: He is not in acute distress.     Appearance: He is well-developed. He is obese. He is not diaphoretic.   HENT: "      Head: Normocephalic and atraumatic.      Right Ear: External ear normal.      Left Ear: External ear normal.      Nose: Nose normal.   Eyes:      Conjunctiva/sclera: Conjunctivae normal.      Pupils: Pupils are equal, round, and reactive to light.   Neck:      Thyroid: No thyromegaly.      Trachea: No tracheal deviation.   Cardiovascular:      Rate and Rhythm: Normal rate and regular rhythm.      Heart sounds: Normal heart sounds. No murmur heard.   No friction rub. No gallop.    Pulmonary:      Effort: Pulmonary effort is normal. No respiratory distress.      Breath sounds: Normal breath sounds.   Abdominal:      General: Bowel sounds are normal.      Palpations: Abdomen is soft. There is no mass.      Tenderness: There is no abdominal tenderness. There is no guarding.   Musculoskeletal:         General: Normal range of motion.      Cervical back: Normal range of motion and neck supple.      Comments: Slow to stand and favors the right leg with walking.  Negative straight leg raise.    Lymphadenopathy:      Cervical: No cervical adenopathy.   Skin:     General: Skin is warm and dry.      Capillary Refill: Capillary refill takes less than 2 seconds.      Findings: No rash.   Neurological:      Mental Status: He is alert and oriented to person, place, and time.      Motor: No abnormal muscle tone.      Deep Tendon Reflexes: Reflexes normal.   Psychiatric:         Behavior: Behavior normal.         Thought Content: Thought content normal.         Judgment: Judgment normal.         No results found for this or any previous visit (from the past 2016 hour(s)).  Assessment/Plan   Diagnoses and all orders for this visit:    1. Chronic midline low back pain without sciatica (Primary)  -     Ambulatory Referral to Pain Management    2. Spinal stenosis of lumbar region with neurogenic claudication  -     Ambulatory Referral to Pain Management    3. S/P lumbar spinal fusion  -     Ambulatory Referral to Pain  Management    4. Idiopathic peripheral neuropathy  -     pregabalin (LYRICA) 150 MG capsule; Take 1 capsule by mouth 2 (Two) Times a Day.  Dispense: 180 capsule; Refill: 1    Will continue the current medication and lyrica.  NEHA run and reviewed.  Risks of the medication include but are not limited to fatigue, somnolence, increased risk of falls, constipation, allergic reaction, dependence, and addiction.  Will refer to pain management.  Patient requests [facility].      I spent 24 minutes caring for Isaac on this date of service. This time includes time spent by me in the following activities:preparing for the visit, reviewing tests, performing a medically appropriate examination and/or evaluation , counseling and educating the patient/family/caregiver, ordering medications, tests, or procedures and documenting information in the medical record     · COVID-19 Precautions - Patient was compliant in wearing a mask. When I saw the patient, I used appropriate personal protective equipment (PPE) including mask and eye shield (standard procedure).  Additionally, I used gown and gloves if indicated.  Hand hygiene was completed before and after seeing the patient.  · Dictated utilizing Dragon Dictation

## 2021-07-26 NOTE — PROGRESS NOTES
The patient has a pain history of the following:  Chronic low back pain  Lumbar stenosis   History of Lumbar Surgery   Peripheral neuropathy    Previous interventions that the patient has received include:   Radiofrequency ablation - helped some   Sacroiliac joint injections 1/3/18, 12/6/17  Right trochanteric bursa injection   Lumbar epidural steroid injections     Pain medications include:  Lyrica   Advil     Previously: Medrol dose pack    Other conservative modalities which the patient reports using include:  Physical Therapy: yes - no improvement  Neck or back surgery: yes  Past pain management: yes - [facility]    Past Significant Surgical History:  L4-5 fusion - Dr. Frias   Right sacroiliac joint fusion     HPI:       CHIEF COMPLAINT: Back Pain      Isaac Suarez is a 75 y.o. male referred here by Guillermo Moran MD. Isaac Suarez presents to the office for evaluation and treatment of Back Pain      Onset:  Many years ago, but recently worsened   Inciting Event:  Started after working on his house   Location:  Bilateral buttocks/legs  Pain: Pain described as ache, heaviness, numbness, but sometimes sharp. Located in the bilateral buttocks and does radiate into bilateral lower extremities.  Severity:  Pain rated as a 0 /10.  Symptoms have been episodic.  Exacerbation:  Walking.   Alleviation:  Sitting/resting.  His pain almost resolves when walking with a grocery cart.   Associated Symptoms:   He denies any new onset of bowel or bladder weakness, significant leg weakness or saddle anesthesia. Positive for balance problems or lower extremity incoordination.  Ambulates: Without assistive device, but uses a cane for long distances.    He presents today stating that he does not want to be on opioids unless his pain becomes completely unbearable.  He was very unhappy with his previous pain clinic experience and relays that information to me today.  His goal is a permanent fix for his back and leg issues.         PEG Assessment   What number best describes your pain on average in the past week?7  What number best describes how, during the past week, pain has interfered with your enjoyment of life?0  What number best describes how, during the past week, pain has interfered with your general activity?  5        Current Outpatient Medications:   •  aspirin 81 MG EC tablet, Take 81 mg by mouth every morning. STOPPED 9/12/16, Disp: , Rfl:   •  furosemide (LASIX) 20 MG tablet, Take 1 tablet by mouth Every Morning., Disp: 90 tablet, Rfl: 1  •  Ibuprofen-Acetaminophen (ADVIL DUAL ACTION PO), Take  by mouth., Disp: , Rfl:   •  levocetirizine (XYZAL) 5 MG tablet, Take 5 mg by mouth Every Evening., Disp: , Rfl:   •  lisinopril (PRINIVIL,ZESTRIL) 10 MG tablet, Take 1 tablet by mouth Every Morning., Disp: 90 tablet, Rfl: 1  •  pregabalin (LYRICA) 150 MG capsule, Take 1 capsule by mouth 2 (Two) Times a Day., Disp: 180 capsule, Rfl: 1  •  Bempedoic Acid (Nexletol) 180 MG tablet, Take 1 tablet by mouth Daily., Disp: 30 tablet, Rfl: 3  •  Evolocumab (Repatha) solution prefilled syringe injection, Inject 1 mL under the skin into the appropriate area as directed Every 14 (Fourteen) Days., Disp: 2 mL, Rfl: 5  •  rOPINIRole (REQUIP) 0.25 MG tablet, Take 1 tablet by mouth Daily. Take 1 hour before bedtime., Disp: 90 tablet, Rfl: 1    The following portions of the patient's history were reviewed and updated as appropriate: allergies, current medications, past family history, past medical history, past social history, past surgical history and problem list.      REVIEW OF PERTINENT MEDICAL DATA          1/5/21 Creatinine 1.75    11/27/19 Platelets 244 (10*3)    Review of Systems   Constitutional: Positive for activity change (decreased). Negative for chills, fatigue and fever.   HENT: Negative for congestion.    Eyes: Negative for visual disturbance.   Respiratory: Negative for chest tightness and shortness of breath.    Cardiovascular:  "Negative for chest pain.   Gastrointestinal: Negative for abdominal pain, constipation and diarrhea.   Genitourinary: Negative for difficulty urinating and dysuria.   Musculoskeletal: Positive for back pain.   Neurological: Positive for numbness (right leg). Negative for dizziness, weakness, light-headedness and headaches.   Psychiatric/Behavioral: Positive for agitation and sleep disturbance. The patient is not nervous/anxious.      I have reviewed and confirmed the accuracy of the ROS as documented by the MA/LPN/RN Irasema Loco MD      Vitals:    07/27/21 0854   BP: 145/83   Pulse: 81   Resp: 16   Temp: 96.6 °F (35.9 °C)   SpO2: 96%   Weight: 124 kg (274 lb 6.4 oz)   Height: 182.9 cm (72\")   PainSc: 0-No pain         Objective   Physical Exam  Vitals reviewed.   Constitutional:       General: He is not in acute distress.  Pulmonary:      Effort: Pulmonary effort is normal. No respiratory distress.   Musculoskeletal:      Comments: Ambulation: Without assistive device  Lumbar Exam:  Appearance: Scoliotic curve absent and scarring present  Palpated over lumbosacral paravertebral regions and transverse processes with negative tenderness appreciated, Bilateral.   Sacroiliac joints are not tender, Bilateral.  Trochanteric bursa are not tender, Bilateral.  Straight leg raise is negative radiculopathy, Bilateral.  Slump test is negative  radiculopathy, Bilateral.  Facet loading is positive for pain, Bilateral.  Paraspinal/adjacent lumbar musculature are not tender to palpation, Bilateral.   Skin:     General: Skin is warm and dry.   Neurological:      General: No focal deficit present.      Mental Status: He is alert.      Deep Tendon Reflexes:      Reflex Scores:       Patellar reflexes are 2+ on the right side and 2+ on the left side.       Achilles reflexes are 2+ on the right side and 2+ on the left side.     Comments: Negative clonus bilaterally   Psychiatric:         Mood and Affect: Mood normal.         Thought " "Content: Thought content normal.         Assessment/Plan   Diagnoses and all orders for this visit:    1. Spinal stenosis of lumbar region with neurogenic claudication (Primary)  -     Case Request    2. Osteoarthritis of lumbar spine, unspecified spinal osteoarthritis complication status    3. Lumbar radiculopathy        - Baseline urine drug screen was obtained.  - Pertinent labs reviewed.   - Pertinent imaging reviewed.   - Has failed to control pain with PT and Lyrica.  - Explained that there may not be a \"permanent\" fix for his back.  Epidural steroid injection and/or decompression surgery would be options to treat his stenosis.  He understands.   - Educated on stenosis and signs/symptoms that would warrant ER visit.  He understands.   - Will schedule for L3-4 Epidural steroid injection.  Risks discussed including but not limited to bleeding, bruising, infection, damage to surrounding structures, headache, and rare things such as being paralyzed, seizure, stroke, heart attack and death.  The risk of steroid medications include but are not limited to immunosuppression, which can increase the risk of elijah an infectious disease as well as decrease the immune response to a vaccine.      --- Follow-up for L3-4 Epidural steroid injection and office visit 4 weeks after.            NEHA REPORT    As the clinician, I personally reviewed the NEHA from 7/27/21 while the patient was in the office today.    While examining this patient, I wore protective equipment including a mask, eye shield and gloves.  I washed my hands before and after this patient encounter.  The patient wore a mask throughout the visit as well.     Irasema Loco MD  Pain Management  "

## 2021-07-27 ENCOUNTER — TRANSCRIBE ORDERS (OUTPATIENT)
Dept: SURGERY | Facility: SURGERY CENTER | Age: 76
End: 2021-07-27

## 2021-07-27 ENCOUNTER — PREP FOR SURGERY (OUTPATIENT)
Dept: SURGERY | Facility: SURGERY CENTER | Age: 76
End: 2021-07-27

## 2021-07-27 ENCOUNTER — OFFICE VISIT (OUTPATIENT)
Dept: PAIN MEDICINE | Facility: CLINIC | Age: 76
End: 2021-07-27

## 2021-07-27 VITALS
HEIGHT: 72 IN | OXYGEN SATURATION: 96 % | RESPIRATION RATE: 16 BRPM | HEART RATE: 81 BPM | TEMPERATURE: 96.6 F | SYSTOLIC BLOOD PRESSURE: 145 MMHG | WEIGHT: 274.4 LBS | BODY MASS INDEX: 37.17 KG/M2 | DIASTOLIC BLOOD PRESSURE: 83 MMHG

## 2021-07-27 DIAGNOSIS — M48.062 SPINAL STENOSIS OF LUMBAR REGION WITH NEUROGENIC CLAUDICATION: Primary | ICD-10-CM

## 2021-07-27 DIAGNOSIS — M47.816 OSTEOARTHRITIS OF LUMBAR SPINE, UNSPECIFIED SPINAL OSTEOARTHRITIS COMPLICATION STATUS: ICD-10-CM

## 2021-07-27 DIAGNOSIS — M54.16 LUMBAR RADICULOPATHY: ICD-10-CM

## 2021-07-27 LAB
POC AMPHETAMINES: NEGATIVE
POC BARBITURATES: NEGATIVE
POC BENZODIAZEPHINES: NEGATIVE
POC COCAINE: NEGATIVE
POC METHADONE: NEGATIVE
POC METHAMPHETAMINE SCREEN URINE: NEGATIVE
POC OPIATES: NEGATIVE
POC OXYCODONE: NEGATIVE
POC PHENCYCLIDINE: NEGATIVE
POC PROPOXYPHENE: NEGATIVE
POC THC: NEGATIVE
POC TRICYCLIC ANTIDEPRESSANTS: NEGATIVE

## 2021-07-27 PROCEDURE — 99204 OFFICE O/P NEW MOD 45 MIN: CPT | Performed by: ANESTHESIOLOGY

## 2021-07-27 PROCEDURE — 80305 DRUG TEST PRSMV DIR OPT OBS: CPT | Performed by: ANESTHESIOLOGY

## 2021-07-27 RX ORDER — SODIUM CHLORIDE 0.9 % (FLUSH) 0.9 %
10 SYRINGE (ML) INJECTION EVERY 12 HOURS SCHEDULED
Status: CANCELLED | OUTPATIENT
Start: 2021-07-27

## 2021-07-27 RX ORDER — LEVOCETIRIZINE DIHYDROCHLORIDE 5 MG/1
5 TABLET, FILM COATED ORAL EVERY EVENING
COMMUNITY

## 2021-07-27 RX ORDER — SODIUM CHLORIDE 0.9 % (FLUSH) 0.9 %
10 SYRINGE (ML) INJECTION AS NEEDED
Status: CANCELLED | OUTPATIENT
Start: 2021-07-27

## 2021-07-27 NOTE — PATIENT INSTRUCTIONS
What to expect if we're setting up an injection/procedure    - I have placed the order today, we'll start speaking to your insurance for authorization (this can sometimes take a few weeks).   - You should be scheduled for your procedure before you leave the office.  If you were not, please call our office to schedule.   - A COVID test is no longer required for procedures (except spinal cord stimulator procedures).   - LIGHT Intravenous (IV) sedation is offered for some procedures: you will NOT be put to sleep.  If you plan to have sedation, do not eat or drink anything on the day of your injection.   - If we're using steroid in your injection, it should be scheduled at least 2 weeks before or after your COVID vaccine(s).  - Most procedures require having someone drive you.  Please make sure you arrange a  unless told otherwise.         Epidural Steroid Injection    An epidural steroid injection is a shot of steroid medicine and numbing medicine that is given into the space between the spinal cord and the bones of the back (epidural space). The shot helps relieve pain caused by an irritated or swollen nerve root.  The amount of pain relief you get from the injection depends on what is causing the nerve to be swollen and irritated, and how long your pain lasts. You are more likely to benefit from this injection if your pain is strong and comes on suddenly rather than if you have had long-term (chronic) pain.  Tell a health care provider about:  · Any allergies you have.  · All medicines you are taking, including vitamins, herbs, eye drops, creams, and over-the-counter medicines.  · Any problems you or family members have had with anesthetic medicines.  · Any blood disorders you have.  · Any surgeries you have had.  · Any medical conditions you have.  · Whether you are pregnant or may be pregnant.  What are the risks?  Generally, this is a safe procedure. However, problems may occur,  including:  · Headache.  · Bleeding.  · Infection.  · Allergic reaction to medicines.  · Nerve damage.  What happens before the procedure?  Staying hydrated  Follow instructions from your health care provider about hydration, which may include:  · Up to 2 hours before the procedure - you may continue to drink clear liquids, such as water, clear fruit juice, black coffee, and plain tea.  Eating and drinking restrictions  Follow instructions from your health care provider about eating and drinking, which may include:  · 8 hours before the procedure - stop eating heavy meals or foods, such as meat, fried foods, or fatty foods.  · 6 hours before the procedure - stop eating light meals or foods, such as toast or cereal.  · 6 hours before the procedure - stop drinking milk or drinks that contain milk.  · 2 hours before the procedure - stop drinking clear liquids.  Medicines  · You may be given medicines to lower anxiety.  · Ask your health care provider about:  ? Changing or stopping your regular medicines. This is especially important if you are taking diabetes medicines or blood thinners.  ? Taking medicines such as aspirin and ibuprofen. These medicines can thin your blood. Do not take these medicines unless your health care provider tells you to take them.  ? Taking over-the-counter medicines, vitamins, herbs, and supplements.  · Ask your health care provider what steps will be taken to prevent infection.  General instructions  · Plan to have someone take you home from the hospital or clinic.  · If you will be going home right after the procedure, plan to have someone with you for 24 hours.  What happens during the procedure?  · An IV will be inserted into one of your veins.  · You will be given one or more of the following:  ? A medicine to help you relax (sedative).  ? A medicine to numb the area (local anesthetic).  · You will be asked to lie on your abdomen or sit.  · The injection site will be cleaned.  · A  needle will be inserted through your skin into the epidural space. This may cause you some discomfort. An X-ray machine will be used to guide the needle as close as possible to the affected nerve.  · A steroid medicine and a local anesthetic will be injected into the epidural space.  · The needle and IV will be removed.  · A bandage (dressing) will be put over the injection site.  The procedure may vary among health care providers and hospitals.  What can I expect after the procedure?  Follow these instructions at home:  Injection site care  · You may remove the bandage (dressing) after 24 hours.  · Check your injection site every day for signs of infection. Check for:  ? Redness, swelling, or pain.  ? Fluid or blood.  ? Warmth.  ? Pus or a bad smell.  Managing pain, stiffness, and swelling  · For 24 hours after the procedure:  ? Avoid using heat on the injection site.  ? Do not take baths, swim, or use a hot tub until your health care provider approves. Ask your health care provider if you may take a shower. You may only be allowed to take sponge baths.  · If directed, put ice on the injection site. To do this:  ? Put ice in a plastic bag.  ? Place a towel between your skin and the bag.  ? Leave the ice on for 20 minutes, 2-3 times a day.    Activity  · Do not drive for 24 hours if you were given a sedative during your procedure.  · Return to your normal activities as told by your health care provider. Ask your health care provider what activities are safe for you.  General instructions  · Your blood pressure, heart rate, breathing rate, and blood oxygen level will be monitored until you leave the hospital or clinic.  · Your arm or leg may feel weak or numb for a few hours.  · The injection site may feel sore.  · Take over-the-counter and prescription medicines only as told by your health care provider.  · Drink enough fluid to keep your urine pale yellow.  · Keep all follow-up visits as told by your health care  "provider. This is important.  Contact a health care provider if:  · You have any of these signs of infection:  ? Redness, swelling, or pain around your injection site.  ? Fluid or blood coming from your injection site.  ? Warmth coming from your injection site.  ? Pus or a bad smell coming from your injection site.  ? A fever.  · You continue to have pain and soreness around the injection site, even after taking over-the-counter pain medicine.  · You have severe, sudden, or lasting nausea or vomiting.  Get help right away if:  · You have severe pain at the injection site that is not relieved by medicines.  · You develop a severe headache or a stiff neck.  · You become sensitive to light.  · You have any new numbness or weakness in your legs or arms.  · You lose control of your bladder or bowel movements.  · You have trouble breathing.  Summary  · An epidural steroid injection is a shot of steroid medicine and numbing medicine that is given into the epidural space.  · The shot helps relieve pain caused by an irritated or swollen nerve root.  · You are more likely to benefit from this injection if your pain is strong and comes on suddenly rather than if you have had chronic pain.  This information is not intended to replace advice given to you by your health care provider. Make sure you discuss any questions you have with your health care provider.  Document Revised: 06/29/2020 Document Reviewed: 06/29/2020  [employer] Patient Education © 2021 [employer]        [employer] Floyd neurological surgery (7th ed., pp. 5355-3274). Herminia PA: Elsevier.\"> [employer] Floyd neurological surgery (7th ed., pp. 7508-5196). Herminia PA: Elsevier.\">   Spinal Stenosis    Spinal stenosis is a condition that happens when the spinal canal narrows. The spinal canal is the space between the bones of your spine (vertebrae). This narrowing puts pressure on the spinal cord or nerves. Spinal stenosis can affect the vertebrae in " the neck, upper back, and lower back.  This condition can range from mild to severe. In some cases, there are no symptoms.  What are the causes?  This condition is caused by areas of bone pushing into the spinal canal. This condition may be present at birth (congenital), or it may be caused by:  · Slow breakdown of your vertebrae (spinal degeneration). This usually starts around 50 years of age.  · Injury (trauma) to your spine.  · Tumors in your spine.  · Calcium deposits in your spine.  What increases the risk?  The following factors may make you more likely to develop this condition:  · Being older than age 50.  · Having a problem present at birth with an abnormally shaped spine (congenitalspinal deformity), such as scoliosis.  · Having arthritis.  What are the signs or symptoms?  Symptoms of this condition include:  · Pain in the neck or back that is generally worse with activities, particularly when you stand or walk.  · Numbness, tingling, hot or cold sensations, weakness, or tiredness (fatigue) in your leg or legs.  · Pain going from the buttock, down the thigh, and to the calf (sciatica). This can happen in one or both legs.  · Frequent episodes of falling.  · A foot-slapping gait that leads to muscle weakness.  In more severe cases, you may develop:  · Problems having a bowel movement or urinating.  · Difficulty having sex.  · Loss of feeling in your legs and inability to walk.  Symptoms may come on slowly and get worse over time.  In some cases, there are no symptoms.  How is this diagnosed?  This condition is diagnosed based on your medical history and a physical exam. You will also have tests, such as an MRI, a CT scan, or an X-ray.  How is this treated?  Treatment for this condition often focuses on managing your pain and any other symptoms. Treatment may include:  · Practicing good posture to lessen pressure on your nerves.  · Exercising to strengthen muscles, build endurance, improve balance, and  maintain range of motion. This may include physical therapy to restore movement and strength to your back.  · Losing weight, if needed.  · Medicines to reduce inflammation or pain. This may include a medicine that is injected into your spine (steroidinjection).  · Assistive devices, such as a corset or brace.  In some cases, surgery may be needed. The most common procedure is decompression laminectomy. This is done to remove excess bone that puts pressure on your nerve roots.  Follow these instructions at home:  Managing pain, stiffness, and swelling    · Practice good posture. If you were given a brace or a corset, wear it as told by your health care provider.  · Maintain a healthy weight. Talk with your health care provider if you need help losing weight.  · If directed, apply heat to the affected area as often as told by your health care provider. Use the heat source that your health care provider recommends, such as a moist heat pack or a heating pad.  ? Place a towel between your skin and the heat source.  ? Leave the heat on for 20-30 minutes.  ? Remove the heat if your skin turns bright red. This is especially important if you are unable to feel pain, heat, or cold. You may have a greater risk of getting burned.  Activity  · Do all exercises and stretches as told by your health care provider.  · Do not do any activities that cause pain. Ask your health care provider what activities are safe for you.  · Do not lift anything that is heavier than 10 lb (4.5 kg), or the limit that you are told by your health care provider.  · Return to your normal activities as told by your health care provider. Ask your health care provider what activities are safe for you.  General instructions  · Take over-the-counter and prescription medicines only as told by your health care provider.  · Do not use any products that contain nicotine or tobacco, such as cigarettes, e-cigarettes, and chewing tobacco. If you need help quitting,  ask your health care provider.  · Eat a healthy diet. This includes plenty of fruits and vegetables, whole grains, and low-fat (lean) protein.  · Keep all follow-up visits as told by your health care provider. This is important.  Contact a health care provider if:  · Your symptoms do not get better or they get worse.  · You have a fever.  Get help right away if:  · You have new pain or symptoms of severe pain, such as:  ? New or worsening pain in your neck or upper back.  ? Severe pain that cannot be controlled with medicines.  ? A severe headache that gets worse when you stand.  · You are dizzy.  · You have vision problems, such as blurred vision or double vision.  · You have nausea or you vomit.  · You develop new or worsening numbness or tingling in your back or legs.  · You have pain, redness, swelling, or warmth in your arm or leg.  Summary  · Spinal stenosis is a condition that happens when the spinal canal narrows. The spinal canal is the space between the bones of your spine (vertebrae). This narrowing puts pressure on the spinal cord or nerves.  · This condition may be caused by a birth defect, breakdown of your vertebrae, trauma, tumors, or calcium deposits.  · Spinal stenosis can cause numbness, weakness, or pain in the buttocks, neck, back, and legs.  · This condition is usually diagnosed with your medical history, a physical exam, and tests, such as an MRI, a CT scan, or an X-ray.  This information is not intended to replace advice given to you by your health care provider. Make sure you discuss any questions you have with your health care provider.  Document Revised: 10/15/2020 Document Reviewed: 10/15/2020  [employer]vier Patient Education © 2021 [employer]

## 2021-07-29 ENCOUNTER — PATIENT ROUNDING (BHMG ONLY) (OUTPATIENT)
Dept: PAIN MEDICINE | Facility: CLINIC | Age: 76
End: 2021-07-29

## 2021-08-04 ENCOUNTER — APPOINTMENT (OUTPATIENT)
Dept: GENERAL RADIOLOGY | Facility: SURGERY CENTER | Age: 76
End: 2021-08-04

## 2021-08-05 NOTE — DISCHARGE INSTRUCTIONS
[facility] - Post-procedure Instructions          --  While there are no absolute restrictions, it is recommended that you do not perform strenuous activity today. In the morning, you may resume your level of activity as before your block.    --  If you have a band-aid at your injection site, please remove it later today. Observe the area for any redness, swelling, pus-like drainage, or a temperature over 101°. If any of these symptoms occur, please call your doctor at [phone]. If after office hours, leave a message and the on-call provider will return your call.    --  Ice may be applied to your injection site. It is recommended you avoid direct heat (heating pad; hot tub) for 1-2 days.    --  Call [facility] at [phone] if you experience persistent headache, persistent bleeding from the injection site, or severe pain not relieved by heat or oral medication.    --  Do not make important decisions today.    --  Due to the effects of the block and/or the I.V. Sedation, DO NOT drive or operate hazardous machinery for 12 hours.  Local anesthetics may cause numbness after procedure and precautions must be taken with regards to operating equipment as well as with walking, even if ambulating with assistance of another person or with an assistive device.    --  Do not drink alcohol for 12 hours.    -- You may return to work tomorrow, or as directed by your referring doctor.    --  Occasionally you may notice a slight increase in your pain after the procedure. This should start to improve within the next 24-48 hours. Radiofrequency ablation procedure pain may last 3-4 weeks.    --  It may take as long as 3-4 days before you notice a gradual improvement in your pain and/or other symptoms.    -- You may continue to take your prescribed pain medication as needed.    --  Some normal possible side effects of steroid use could include fluid retention, increased blood sugar, dull headache,  increased sweating, increased appetite, mood swings and flushing.    --  Diabetics are recommended to watch their blood glucose level closely for 24-48 hours after the injection.    --  Must stay in PACU for 20 min upon arrival and prove no leg weakness before being discharged.    --  IN THE EVENT OF A LIFE THREATENING EMERGENCY, (CHEST PAIN, BREATHING DIFFICULTIES, PARALYSIS…) YOU SHOULD GO TO YOUR NEAREST EMERGENCY ROOM.    --  You should be contacted by our office within 2-3 days to schedule follow up or next appointment date.  If not contacted within 7 days, please call the office at [phone]

## 2021-08-06 ENCOUNTER — HOSPITAL ENCOUNTER (OUTPATIENT)
Dept: GENERAL RADIOLOGY | Facility: SURGERY CENTER | Age: 76
Setting detail: HOSPITAL OUTPATIENT SURGERY
End: 2021-08-06

## 2021-08-06 ENCOUNTER — HOSPITAL ENCOUNTER (OUTPATIENT)
Facility: SURGERY CENTER | Age: 76
Setting detail: HOSPITAL OUTPATIENT SURGERY
Discharge: HOME | End: 2021-08-06
Attending: ANESTHESIOLOGY | Admitting: ANESTHESIOLOGY

## 2021-08-06 VITALS
SYSTOLIC BLOOD PRESSURE: 123 MMHG | RESPIRATION RATE: 17 BRPM | DIASTOLIC BLOOD PRESSURE: 66 MMHG | TEMPERATURE: 97.4 F | HEART RATE: 58 BPM | OXYGEN SATURATION: 98 %

## 2021-08-06 DIAGNOSIS — M48.062 SPINAL STENOSIS OF LUMBAR REGION WITH NEUROGENIC CLAUDICATION: ICD-10-CM

## 2021-08-06 DIAGNOSIS — M54.16 LUMBAR RADICULOPATHY: ICD-10-CM

## 2021-08-06 PROCEDURE — 25010000002 DEXAMETHASONE SODIUM PHOSPHATE 100 MG/10ML SOLUTION 10 ML VIAL: Performed by: ANESTHESIOLOGY

## 2021-08-06 PROCEDURE — B01BZZZ FLUOROSCOPY OF SPINAL CORD: ICD-10-PCS | Performed by: ANESTHESIOLOGY

## 2021-08-06 PROCEDURE — 0 IOHEXOL 300 MG/ML SOLUTION 10 ML VIAL: Performed by: ANESTHESIOLOGY

## 2021-08-06 PROCEDURE — 62323 NJX INTERLAMINAR LMBR/SAC: CPT | Performed by: ANESTHESIOLOGY

## 2021-08-06 PROCEDURE — 3E0R33Z INTRODUCTION OF ANTI-INFLAMMATORY INTO SPINAL CANAL, PERCUTANEOUS APPROACH: ICD-10-PCS | Performed by: ANESTHESIOLOGY

## 2021-08-06 RX ORDER — SODIUM CHLORIDE 0.9 % (FLUSH) 0.9 %
10 SYRINGE (ML) INJECTION EVERY 12 HOURS SCHEDULED
Status: DISCONTINUED | OUTPATIENT
Start: 2021-08-06 | End: 2021-08-06 | Stop reason: HOSPADM

## 2021-08-06 RX ORDER — SODIUM CHLORIDE 0.9 % (FLUSH) 0.9 %
10 SYRINGE (ML) INJECTION AS NEEDED
Status: DISCONTINUED | OUTPATIENT
Start: 2021-08-06 | End: 2021-08-06 | Stop reason: HOSPADM

## 2021-08-06 NOTE — OP NOTE
L2/L3 Interlaminar Lumbar Epidural Steroid Injection (Level changed under fluoro due to visibility/surgery)  [facility]    PREOPERATIVE DIAGNOSIS:   Lumbar Spinal Stenosis WITH Neurogenic Claudication and Lumbar Radiculopathy  POSTOPERATIVE DIAGNOSIS:  Same as preop diagnosis    PROCEDURE:   Lumbar Epidural Steroid Injection, Therapeutic Interlaminar Injection, with epidurogram, at  L2/L3 level    PRE-PROCEDURE DISCUSSION WITH PATIENT:    Risks and complications were discussed with the patient prior to starting the procedure and informed consent was obtained.  We discussed various topics including but not limited to bleeding, infection, injury, paralysis, nerve injury, dural puncture, coma, death, worsening of clinical picture, lack of pain relief, and postprocedural soreness.    SURGEON:  Irasema Loco MD    REASON FOR PROCEDURE:    Stenotic area is noted, and is likely contributing to this chronic &/or recurrent pain.     SEDATION:  Patient declined administration of moderate sedation    ANESTHETIC:  Marcaine 0.5%  STEROID:   15mg dexamethasone    DESCRIPTON OF PROCEDURE:    After obtaining informed consent, I.V. was not started in the preop area.   The patient was taken to the operating room and placed in the prone position.  EKG, blood pressure, and pulse oximeter were monitored throughout, and sedation was provided as needed by the RN under my guidance. All pressure points were well padded.  The lumbar spine area was prepped with Chloraprep and draped in a sterile fashion.      AP fluoroscopic image was used to visualize the L2/L3 interspace.  The skin and subcutaneous tissue over the area was anesthetized with 1% Lidocaine.  An 18-Gauge Tuohy needle was then advanced through the anesthetized skin tract under fluoroscopic guidance in a coaxial view using a loss of resistance technique.  Lateral fluoroscopy was used to verify appropriate needle depth.  Once the needle tip was felt to be in  the posterior epidural space, aspiration was noted to be negative for blood or CSF.  A volume of 1mL of Omnipaque 180 was then injected under live fluoroscopy in an AP view which produced good epidural spread with no evidence of loculation, vascular run-off or intrathecal spread.  Subsequently, a total volume of 5mL consisting of 15mg of dexamethasone, 0.5mL of 0.5% bupivcacaine and normal saline was injected without resistance.  The needle was removed intact.     ESTIMATED BLOOD LOSS:  <5 mL  SPECIMENS:  None    COMPLICATIONS:     No complications were noted., There was no indication of vascular uptake on live injection of contrast dye. and There was no indication of intrathecal uptake on live injection of contrast dye.    TOLERANCE & DISCHARGE CONDITION:    The patient tolerated the procedure well.  The patient was transported to the recovery area without difficulties.  The patient was discharged to home under the care of family in stable and satisfactory condition.    PLAN OF CARE:  1. The patient was given our standard instruction sheet.  2. The patient will Return to clinic 3-4 wks  3. The patient will resume all medications as per the medication reconciliation sheet.  4. Will consider bilateral L3-4 Transforaminal epidural steroid injection if he does not have good results with this approach.

## 2021-09-01 ENCOUNTER — PREP FOR SURGERY (OUTPATIENT)
Dept: SURGERY | Facility: SURGERY CENTER | Age: 76
End: 2021-09-01

## 2021-09-01 ENCOUNTER — OFFICE VISIT (OUTPATIENT)
Dept: PAIN MEDICINE | Facility: CLINIC | Age: 76
End: 2021-09-01

## 2021-09-01 ENCOUNTER — HOSPITAL ENCOUNTER (OUTPATIENT)
Dept: GENERAL RADIOLOGY | Facility: HOSPITAL | Age: 76
Discharge: HOME | End: 2021-09-01
Admitting: NURSE PRACTITIONER

## 2021-09-01 ENCOUNTER — TRANSCRIBE ORDERS (OUTPATIENT)
Dept: SURGERY | Facility: SURGERY CENTER | Age: 76
End: 2021-09-01

## 2021-09-01 VITALS
HEART RATE: 81 BPM | SYSTOLIC BLOOD PRESSURE: 146 MMHG | HEIGHT: 72 IN | OXYGEN SATURATION: 98 % | BODY MASS INDEX: 37.65 KG/M2 | DIASTOLIC BLOOD PRESSURE: 81 MMHG | WEIGHT: 278 LBS | RESPIRATION RATE: 20 BRPM | TEMPERATURE: 96.9 F

## 2021-09-01 DIAGNOSIS — M54.16 LUMBAR RADICULOPATHY: ICD-10-CM

## 2021-09-01 DIAGNOSIS — M48.062 SPINAL STENOSIS OF LUMBAR REGION WITH NEUROGENIC CLAUDICATION: Primary | ICD-10-CM

## 2021-09-01 DIAGNOSIS — M25.562 LEFT KNEE PAIN, UNSPECIFIED CHRONICITY: Primary | ICD-10-CM

## 2021-09-01 DIAGNOSIS — M25.562 LEFT KNEE PAIN, UNSPECIFIED CHRONICITY: ICD-10-CM

## 2021-09-01 DIAGNOSIS — M47.816 OSTEOARTHRITIS OF LUMBAR SPINE, UNSPECIFIED SPINAL OSTEOARTHRITIS COMPLICATION STATUS: ICD-10-CM

## 2021-09-01 DIAGNOSIS — M71.22 SYNOVIAL CYST OF LEFT POPLITEAL SPACE: ICD-10-CM

## 2021-09-01 DIAGNOSIS — M48.062 SPINAL STENOSIS OF LUMBAR REGION WITH NEUROGENIC CLAUDICATION: ICD-10-CM

## 2021-09-01 DIAGNOSIS — G89.29 OTHER CHRONIC PAIN: Primary | ICD-10-CM

## 2021-09-01 PROCEDURE — 73562 X-RAY EXAM OF KNEE 3: CPT

## 2021-09-01 PROCEDURE — 99214 OFFICE O/P EST MOD 30 MIN: CPT | Performed by: NURSE PRACTITIONER

## 2021-09-01 PROCEDURE — 96372 THER/PROPH/DIAG INJ SC/IM: CPT | Performed by: NURSE PRACTITIONER

## 2021-09-01 RX ORDER — KETOROLAC TROMETHAMINE 30 MG/ML
60 INJECTION, SOLUTION INTRAMUSCULAR; INTRAVENOUS ONCE
Status: COMPLETED | OUTPATIENT
Start: 2021-09-01 | End: 2021-09-01

## 2021-09-01 RX ORDER — KETOROLAC TROMETHAMINE 30 MG/ML
30 INJECTION, SOLUTION INTRAMUSCULAR; INTRAVENOUS ONCE
Status: SHIPPED | OUTPATIENT
Start: 2021-09-01 | End: 2021-09-06

## 2021-09-01 RX ORDER — BIMATOPROST 0.01 %
DROPS OPHTHALMIC (EYE)
COMMUNITY
Start: 2021-08-17

## 2021-09-01 RX ORDER — SODIUM CHLORIDE 0.9 % (FLUSH) 0.9 %
10 SYRINGE (ML) INJECTION AS NEEDED
Status: CANCELLED | OUTPATIENT
Start: 2021-09-01

## 2021-09-01 RX ORDER — SODIUM CHLORIDE 0.9 % (FLUSH) 0.9 %
10 SYRINGE (ML) INJECTION EVERY 12 HOURS SCHEDULED
Status: CANCELLED | OUTPATIENT
Start: 2021-09-01

## 2021-09-01 RX ADMIN — KETOROLAC TROMETHAMINE 60 MG: 30 INJECTION, SOLUTION INTRAMUSCULAR; INTRAVENOUS at 09:16

## 2021-09-01 NOTE — PROGRESS NOTES
Left knee xr shows nothing acute such as fracture. Shows arthritis on inside and top of knee. THere also appears to be a baker's cyst in the posterior knee. Can refer to orthopedist if he wishes. Let me know. Thanks. CALLIE

## 2021-09-01 NOTE — H&P (VIEW-ONLY)
CHIEF COMPLAINT  F/u back pain. Pt had LUMBAR EPIDURAL and received relief x 2 weeks then pain returned to baseline. Pt sts increased pain in left knee recently.    Subjective   Isaac Suarez is a 75 y.o. male  who presents for follow-up.  He has a history of chronic back pain. Reports this is improved since last evaluation.    Patient presents for follow-up of PROCEDURE. Patient had a LESI L2-3(was initially planned for 3-4 but was unable to enter area) performed by Dr. HUNT on 8-6-21 for management of LOW BACK PAIN. Patient reports 30% ONGOING relief from the procedure. With the first two weeks, he had 80-90% relief. Noted improvement in activity with ability to walk.    Is leaving for [place] October 1st, returning after thanksgiving.     He has left knee pain. This started 6 days ago but significantly increased 3 days ago. Does not remember any injury or activity. Pain is significant. Pain increases with walking, standing, laying flat in bed(having to now sleep in recliner); pain decreases with rest, medication and ice. Has been taking Ibuprofen  mg 2 BID. Unsure of relief.  The knee pain is so bad, it has made him walk with a cane.     Was initially evaluated as a new patient by Dr. Irasema Hunt on 7/27/2021.  He was referred here by his PCP Dr. Li off it for evaluation of back pain.  Previously was seen at [facility] and was unhappy with his previous pain experience.  He states that he wants to avoid opioids unless absolutely necessary.  Baseline urine drug screen was obtained.  Plan for L3-L4 epidural steroid injection.  Plan to follow-up 4 weeks after procedure or sooner if needed.    Patient remained masked during entire encounter. No cough present. I donned a mask and eye protection throughout entire visit. Prior to donning mask and eye protection, hand hygiene was performed, as well as when it was doffed.  I was closer than 6 feet, but not for an extended period of time. No obvious  exposure to any bodily fluids.    Back Pain  This is a chronic problem. The current episode started more than 1 year ago. The problem occurs constantly. The problem has been waxing and waning since onset. The pain is present in the lumbar spine. The quality of the pain is described as aching. The pain is at a severity of 4/10. The pain is worse during the day. The symptoms are aggravated by bending, position, standing and twisting. Pertinent negatives include no bladder incontinence, bowel incontinence, chest pain, fever, headaches, numbness or weakness.   Knee Pain   The incident occurred 5 to 7 days ago. There was no injury mechanism. The pain is present in the left knee. The quality of the pain is described as stabbing. The pain is at a severity of 9/10. The pain is severe. The pain has been worsening since onset. Associated symptoms include an inability to bear weight and a loss of motion. Pertinent negatives include no numbness. The symptoms are aggravated by movement and weight bearing.      The patient has a pain history of the following:  Chronic low back pain  Lumbar stenosis   History of Lumbar Surgery   Peripheral neuropathy     Previous interventions that the patient has received include:   L2-L3 LESI- 8-6-21  Radiofrequency ablation - helped some   Sacroiliac joint injections 1/3/18, 12/6/17  Right trochanteric bursa injection   Lumbar epidural steroid injections      Pain medications include:  Lyrica   Advil      Previously: Medrol dose pack     Other conservative modalities which the patient reports using include:  Physical Therapy: yes - no improvement  Neck or back surgery: yes  Past pain management: yes - [facility]     Past Significant Surgical History:  L4-5 fusion - Dr. Frias   Right sacroiliac joint fusion       PEG Assessment   What number best describes your pain on average in the past week?4  What number best describes how, during the past week, pain has interfered with your enjoyment of  "life?4  What number best describes how, during the past week, pain has interfered with your general activity?  4      The following portions of the patient's history were reviewed and updated as appropriate: allergies, current medications, past family history, past medical history, past social history, past surgical history and problem list.    Review of Systems   Constitutional: Positive for activity change (dec). Negative for fatigue and fever.   HENT: Negative for congestion.    Eyes: Negative for visual disturbance.   Respiratory: Negative for cough and shortness of breath.    Cardiovascular: Negative for chest pain.   Gastrointestinal: Negative for bowel incontinence, constipation and diarrhea.   Genitourinary: Negative for bladder incontinence and difficulty urinating.   Musculoskeletal: Positive for arthralgias (left knee), back pain and gait problem (cane).   Allergic/Immunologic: Negative for immunocompromised state.   Neurological: Negative for dizziness, weakness, light-headedness, numbness and headaches.   Psychiatric/Behavioral: Positive for sleep disturbance (occ). Negative for agitation and suicidal ideas. The patient is not nervous/anxious.      I have reviewed and confirmed the accuracy of the ROS as documented by the MA/LPN/RN ROSIO Mcfarland    Vitals:    09/01/21 0850   BP: 146/81   Pulse: 81   Resp: 20   Temp: 96.9 °F (36.1 °C)   SpO2: 98%   Weight: 126 kg (278 lb)   Height: 182.9 cm (72\")   PainSc:   4   PainLoc: Back         Objective   Physical Exam  Vitals and nursing note reviewed.   Constitutional:       Appearance: He is well-developed.   HENT:      Head: Normocephalic and atraumatic.   Musculoskeletal:      Lumbar back: Tenderness present. Decreased range of motion. Positive left straight leg raise test.      Left knee: Swelling and bony tenderness present. Decreased range of motion.      Comments: Surgical scar of lumbar spine   Neurological:      Mental Status: He is alert.     "  Gait: Gait abnormal (walking with cane--- due to knee pain).   Psychiatric:         Speech: Speech normal.         Behavior: Behavior normal.         Thought Content: Thought content normal.         Judgment: Judgment normal.             Assessment/Plan   Diagnoses and all orders for this visit:    1. Other chronic pain (Primary)    2. Spinal stenosis of lumbar region with neurogenic claudication    3. Osteoarthritis of lumbar spine, unspecified spinal osteoarthritis complication status    4. Lumbar radiculopathy    5. Left knee pain, unspecified chronicity  -     XR Knee 3 View Left; Future  -     ketorolac (TORADOL) injection 30 mg      --- Toradol 30 mg IM now for knee pain. Reviewed no ibuprofen for 24 hours.  --- Left knee xray. Depending on results, may refer to orthopedist. Reviewed with patient.   --- Repeat LESI(prior to leaving for [place]). No blood thinners. Reviewed the procedure at length with the patient.  Included in the review was expectations, complications, risk and benefits.The procedure was described in detail and the risks, benefits and alternatives were discussed with the patient (including but not limited to: bleeding, infection, nerve damage, worsening of pain, inability to perform injection, paralysis, seizures, and death) who agreed to proceed.  Discussed the potential for sedation if warranted/wanted.  The procedure will plan to be performed at [facility] with fluoroscopic guidance(unless ultrasound is indicated) and could potentially have steroids and contrast dye used. Questions were answered and in a way the patient could understand.  Patient verbalized understanding and wishes to proceed.  This intervention will be ordered.  Discussed with patient that all procedures are part of a multimodal plan of care and include either formal PT or a home exercise program.  Patient has no evidence of coagulopathy or current infection.  --- Follow-up after procedure or sooner  if needed     NEHA REPORT  As the clinician, I personally reviewed the NEHA from 9-1-21 while the patient was in the office today.           Dictated utilizing Dragon dictation.

## 2021-09-01 NOTE — PROGRESS NOTES
CHIEF COMPLAINT  F/u back pain. Pt had LUMBAR EPIDURAL and received relief x 2 weeks then pain returned to baseline. Pt sts increased pain in left knee recently.    Subjective   Isaac Suarez is a 75 y.o. male  who presents for follow-up.  He has a history of chronic back pain. Reports this is improved since last evaluation.    Patient presents for follow-up of PROCEDURE. Patient had a LESI L2-3(was initially planned for 3-4 but was unable to enter area) performed by Dr. HUNT on 8-6-21 for management of LOW BACK PAIN. Patient reports 30% ONGOING relief from the procedure. With the first two weeks, he had 80-90% relief. Noted improvement in activity with ability to walk.    Is leaving for [place] October 1st, returning after thanksgiving.     He has left knee pain. This started 6 days ago but significantly increased 3 days ago. Does not remember any injury or activity. Pain is significant. Pain increases with walking, standing, laying flat in bed(having to now sleep in recliner); pain decreases with rest, medication and ice. Has been taking Ibuprofen  mg 2 BID. Unsure of relief.  The knee pain is so bad, it has made him walk with a cane.     Was initially evaluated as a new patient by Dr. Irasema Hunt on 7/27/2021.  He was referred here by his PCP Dr. Li off it for evaluation of back pain.  Previously was seen at [facility] and was unhappy with his previous pain experience.  He states that he wants to avoid opioids unless absolutely necessary.  Baseline urine drug screen was obtained.  Plan for L3-L4 epidural steroid injection.  Plan to follow-up 4 weeks after procedure or sooner if needed.    Patient remained masked during entire encounter. No cough present. I donned a mask and eye protection throughout entire visit. Prior to donning mask and eye protection, hand hygiene was performed, as well as when it was doffed.  I was closer than 6 feet, but not for an extended period of time. No obvious  exposure to any bodily fluids.    Back Pain  This is a chronic problem. The current episode started more than 1 year ago. The problem occurs constantly. The problem has been waxing and waning since onset. The pain is present in the lumbar spine. The quality of the pain is described as aching. The pain is at a severity of 4/10. The pain is worse during the day. The symptoms are aggravated by bending, position, standing and twisting. Pertinent negatives include no bladder incontinence, bowel incontinence, chest pain, fever, headaches, numbness or weakness.   Knee Pain   The incident occurred 5 to 7 days ago. There was no injury mechanism. The pain is present in the left knee. The quality of the pain is described as stabbing. The pain is at a severity of 9/10. The pain is severe. The pain has been worsening since onset. Associated symptoms include an inability to bear weight and a loss of motion. Pertinent negatives include no numbness. The symptoms are aggravated by movement and weight bearing.      The patient has a pain history of the following:  Chronic low back pain  Lumbar stenosis   History of Lumbar Surgery   Peripheral neuropathy     Previous interventions that the patient has received include:   L2-L3 LESI- 8-6-21  Radiofrequency ablation - helped some   Sacroiliac joint injections 1/3/18, 12/6/17  Right trochanteric bursa injection   Lumbar epidural steroid injections      Pain medications include:  Lyrica   Advil      Previously: Medrol dose pack     Other conservative modalities which the patient reports using include:  Physical Therapy: yes - no improvement  Neck or back surgery: yes  Past pain management: yes - [facility]     Past Significant Surgical History:  L4-5 fusion - Dr. Frais   Right sacroiliac joint fusion       PEG Assessment   What number best describes your pain on average in the past week?4  What number best describes how, during the past week, pain has interfered with your enjoyment of  "life?4  What number best describes how, during the past week, pain has interfered with your general activity?  4      The following portions of the patient's history were reviewed and updated as appropriate: allergies, current medications, past family history, past medical history, past social history, past surgical history and problem list.    Review of Systems   Constitutional: Positive for activity change (dec). Negative for fatigue and fever.   HENT: Negative for congestion.    Eyes: Negative for visual disturbance.   Respiratory: Negative for cough and shortness of breath.    Cardiovascular: Negative for chest pain.   Gastrointestinal: Negative for bowel incontinence, constipation and diarrhea.   Genitourinary: Negative for bladder incontinence and difficulty urinating.   Musculoskeletal: Positive for arthralgias (left knee), back pain and gait problem (cane).   Allergic/Immunologic: Negative for immunocompromised state.   Neurological: Negative for dizziness, weakness, light-headedness, numbness and headaches.   Psychiatric/Behavioral: Positive for sleep disturbance (occ). Negative for agitation and suicidal ideas. The patient is not nervous/anxious.      I have reviewed and confirmed the accuracy of the ROS as documented by the MA/LPN/RN ROSIO Mcfarland    Vitals:    09/01/21 0850   BP: 146/81   Pulse: 81   Resp: 20   Temp: 96.9 °F (36.1 °C)   SpO2: 98%   Weight: 126 kg (278 lb)   Height: 182.9 cm (72\")   PainSc:   4   PainLoc: Back         Objective   Physical Exam  Vitals and nursing note reviewed.   Constitutional:       Appearance: He is well-developed.   HENT:      Head: Normocephalic and atraumatic.   Musculoskeletal:      Lumbar back: Tenderness present. Decreased range of motion. Positive left straight leg raise test.      Left knee: Swelling and bony tenderness present. Decreased range of motion.      Comments: Surgical scar of lumbar spine   Neurological:      Mental Status: He is alert.     "  Gait: Gait abnormal (walking with cane--- due to knee pain).   Psychiatric:         Speech: Speech normal.         Behavior: Behavior normal.         Thought Content: Thought content normal.         Judgment: Judgment normal.             Assessment/Plan   Diagnoses and all orders for this visit:    1. Other chronic pain (Primary)    2. Spinal stenosis of lumbar region with neurogenic claudication    3. Osteoarthritis of lumbar spine, unspecified spinal osteoarthritis complication status    4. Lumbar radiculopathy    5. Left knee pain, unspecified chronicity  -     XR Knee 3 View Left; Future  -     ketorolac (TORADOL) injection 30 mg      --- Toradol 30 mg IM now for knee pain. Reviewed no ibuprofen for 24 hours.  --- Left knee xray. Depending on results, may refer to orthopedist. Reviewed with patient.   --- Repeat LESI(prior to leaving for [place]). No blood thinners. Reviewed the procedure at length with the patient.  Included in the review was expectations, complications, risk and benefits.The procedure was described in detail and the risks, benefits and alternatives were discussed with the patient (including but not limited to: bleeding, infection, nerve damage, worsening of pain, inability to perform injection, paralysis, seizures, and death) who agreed to proceed.  Discussed the potential for sedation if warranted/wanted.  The procedure will plan to be performed at [facility] with fluoroscopic guidance(unless ultrasound is indicated) and could potentially have steroids and contrast dye used. Questions were answered and in a way the patient could understand.  Patient verbalized understanding and wishes to proceed.  This intervention will be ordered.  Discussed with patient that all procedures are part of a multimodal plan of care and include either formal PT or a home exercise program.  Patient has no evidence of coagulopathy or current infection.  --- Follow-up after procedure or sooner  if needed     NEHA REPORT  As the clinician, I personally reviewed the NEHA from 9-1-21 while the patient was in the office today.           Dictated utilizing Dragon dictation.

## 2021-09-01 NOTE — PROGRESS NOTES
Called and informed pt of results today. He verbalized understanding. He would like referral to orthopedist. Can you please order referral? Thank you.

## 2021-09-03 ENCOUNTER — HOSPITAL ENCOUNTER (EMERGENCY)
Facility: HOSPITAL | Age: 76
Discharge: HOME | End: 2021-09-03
Attending: EMERGENCY MEDICINE | Admitting: EMERGENCY MEDICINE

## 2021-09-03 VITALS
SYSTOLIC BLOOD PRESSURE: 131 MMHG | DIASTOLIC BLOOD PRESSURE: 80 MMHG | HEART RATE: 85 BPM | OXYGEN SATURATION: 99 % | TEMPERATURE: 97.7 F | RESPIRATION RATE: 16 BRPM

## 2021-09-03 DIAGNOSIS — M25.562 ACUTE PAIN OF LEFT KNEE: ICD-10-CM

## 2021-09-03 DIAGNOSIS — M25.462 EFFUSION, LEFT KNEE: Primary | ICD-10-CM

## 2021-09-03 DIAGNOSIS — M17.12 OSTEOARTHRITIS OF LEFT KNEE, UNSPECIFIED OSTEOARTHRITIS TYPE: ICD-10-CM

## 2021-09-03 LAB
APPEARANCE FLD: ABNORMAL
COLOR FLD: YELLOW
LYMPHOCYTES NFR FLD MANUAL: 4 %
METHOD: ABNORMAL
MONOCYTES NFR FLD: 5 %
NEUTROPHILS NFR FLD MANUAL: 91 %
NUC CELL # FLD: 6550 /MM3
RBC # FLD AUTO: 14 /MM3

## 2021-09-03 PROCEDURE — 87070 CULTURE OTHR SPECIMN AEROBIC: CPT | Performed by: EMERGENCY MEDICINE

## 2021-09-03 PROCEDURE — 89051 BODY FLUID CELL COUNT: CPT | Performed by: EMERGENCY MEDICINE

## 2021-09-03 PROCEDURE — 25010000002 TRIAMCINOLONE PER 10 MG: Performed by: EMERGENCY MEDICINE

## 2021-09-03 PROCEDURE — 89060 EXAM SYNOVIAL FLUID CRYSTALS: CPT | Performed by: EMERGENCY MEDICINE

## 2021-09-03 PROCEDURE — 25010000003 LIDOCAINE 1 % SOLUTION: Performed by: EMERGENCY MEDICINE

## 2021-09-03 PROCEDURE — 87205 SMEAR GRAM STAIN: CPT | Performed by: EMERGENCY MEDICINE

## 2021-09-03 PROCEDURE — 99283 EMERGENCY DEPT VISIT LOW MDM: CPT

## 2021-09-03 RX ORDER — LIDOCAINE HYDROCHLORIDE 10 MG/ML
10 INJECTION, SOLUTION INFILTRATION; PERINEURAL ONCE
Status: COMPLETED | OUTPATIENT
Start: 2021-09-03 | End: 2021-09-03

## 2021-09-03 RX ORDER — IBUPROFEN 800 MG/1
800 TABLET ORAL ONCE
Status: COMPLETED | OUTPATIENT
Start: 2021-09-03 | End: 2021-09-03

## 2021-09-03 RX ORDER — TRIAMCINOLONE ACETONIDE 40 MG/ML
40 INJECTION, SUSPENSION INTRA-ARTICULAR; INTRAMUSCULAR ONCE
Status: COMPLETED | OUTPATIENT
Start: 2021-09-03 | End: 2021-09-03

## 2021-09-03 RX ADMIN — TRIAMCINOLONE ACETONIDE 40 MG: 40 INJECTION, SUSPENSION INTRA-ARTICULAR; INTRAMUSCULAR at 10:51

## 2021-09-03 RX ADMIN — IBUPROFEN 800 MG: 800 TABLET, FILM COATED ORAL at 13:35

## 2021-09-03 RX ADMIN — LIDOCAINE HYDROCHLORIDE 10 ML: 10 INJECTION, SOLUTION INFILTRATION; PERINEURAL at 10:51

## 2021-09-03 NOTE — ED PROVIDER NOTES
EMERGENCY DEPARTMENT ENCOUNTER    Room Number:  41/41  Date of encounter:  9/3/2021  PCP: Guillermo Moran MD  Historian: Pt    Patient was placed in face mask during triage process. Patient was wearing facemask when I entered the room and throughout our encounter. I wore full protective equipment throughout this patient encounter including a face mask, eye protection, and gloves. Hand hygiene was performed before donning protective equipment and again following doffing of PPE after leaving the room.    HPI:  Chief Complaint: Left knee pain and swelling  A complete HPI/ROS/PMH/PSH/SH/FH are unobtainable due to: N/A   Context: Isaac Suarez is a 75 y.o. male who presents to the ED c/o gradual onset atraumatic left knee pain and swelling over 1.5 weeks with no erythema, fever, or calf tenderness.  No prior history of similar.  No known trauma.  Previously seen at urgent care and given Toradol and an x-ray was obtained.  No relief of symptoms.  Worse with ambulation.  No diabetes, antiplatelet or chronic anticoagulation reported.      MEDICAL HISTORY REVIEW  [facility] urgent care note 8/31/2021 reviewed    Left knee-3 views; 9/1/2021  IMPRESSION:  Osteoarthritis at the medial and patellofemoral  compartments. Mild chondrocalcinosis. Suspect Baker's cyst with 5 mm  osteochondral body posterior to the knee. Atherosclerotic  calcifications.     This report was finalized on 9/1/2021 11:26 AM by Dr. Richard Mehta M.D.    PAST MEDICAL HISTORY  Active Ambulatory Problems     Diagnosis Date Noted   • Spinal stenosis of lumbar region with neurogenic claudication 07/26/2016   • Chronic midline low back pain without sciatica 04/20/2017   • Bilateral sacroiliitis (CMS/HCC) 11/21/2017   • Hypertension 08/20/2019   • Hyperlipidemia 08/20/2019   • Leg cramps 08/20/2019   • Elevated transaminase level 08/20/2019   • Medicare annual wellness visit, subsequent 11/26/2019   • Encounter for hepatitis C screening test for low risk  patient 11/26/2019   • Vitamin D deficiency 11/26/2019   • Muscle spasm 11/26/2019   • RLS (restless legs syndrome)    • S/P lumbar spinal fusion 06/01/2018   • S/P shoulder replacement 12/02/2014   • Idiopathic peripheral neuropathy 07/16/2020   • Other chronic pain 09/01/2021   • Osteoarthritis of lumbar spine 09/01/2021   • Lumbar radiculopathy 09/01/2021     Resolved Ambulatory Problems     Diagnosis Date Noted   • Spondylolisthesis of lumbar region 09/06/2016   • Surgery follow-up examination 10/10/2016     Past Medical History:   Diagnosis Date   • Abnormal MRI, lumbar spine    • Allergic rhinitis    • Arthritis    • Bilateral hearing loss    • Borderline blood pressure    • BPH (benign prostatic hyperplasia)    • Cancer (CMS/HCC)    • Chronic back pain    • Chronic kidney disease (CKD), stage I    • Cough    • Edema    • Erectile dysfunction    • External hemorrhoids    • GERD (gastroesophageal reflux disease)    • High cholesterol    • History of colon polyps    • History of depression    • History of prostate cancer    • Low back pain    • Lumbar stenosis    • Prostate cancer (CMS/HCC)    • Renal cyst    • Statin intolerance          PAST SURGICAL HISTORY  Past Surgical History:   Procedure Laterality Date   • CATARACT EXTRACTION Right    • COLONOSCOPY N/A 8/15/2016    Procedure: COLONOSCOPY INTO CECUM WITH COLD SNARE POLYPECTOMY;  Surgeon: Loc Lee MD;  Location: [facility] ENDOSCOPY;  Service:    • HERNIA REPAIR      bilat inguinal   • INSERTION PROSTATE RADIATION SEED     • LUMBAR DISCECTOMY FUSION INSTRUMENTATION N/A 9/23/2016    Procedure: L 4-5 LUMBAR DISCECTOMY FUSION WITH INSTRUMENTATION;  Surgeon: Michael Frias MD;  Location: [facility] MAIN OR;  Service:    • LUMBAR EPIDURAL INJECTION N/A 8/6/2021    Procedure: LUMBAR EPIDURAL 1ST VISIT 3-4;  Surgeon: Irasema Loco MD;  [facility];  Service: Pain Management;  Laterality: N/A;   • PROSTATE BIOPSY      Needle biopsy   • ROTATOR  CUFF REPAIR Bilateral     metal in both   • TOTAL SHOULDER ARTHROPLASTY Bilateral    • VASECTOMY           FAMILY HISTORY  Family History   Problem Relation Age of Onset   • Cancer Father         BRAIN   • Esophageal cancer Other          SOCIAL HISTORY  Social History     Socioeconomic History   • Marital status:      Spouse name: Not on file   • Number of children: Not on file   • Years of education: 12TH GRADE   • Highest education level: Not on file   Tobacco Use   • Smoking status: Former Smoker     Packs/day: 2.00     Years: 12.00     Pack years: 24.00     Types: Cigarettes     Quit date:      Years since quittin.7   • Smokeless tobacco: Never Used   Vaping Use   • Vaping Use: Never used   Substance and Sexual Activity   • Alcohol use: Yes     Comment: SOCIALLY-Drinks 7 or less drinks per week   • Drug use: No   • Sexual activity: Defer         ALLERGIES  Crestor [rosuvastatin], Gabapentin, Penicillins, Zetia [ezetimibe], and Zocor [simvastatin]        REVIEW OF SYSTEMS  Review of Systems     All systems reviewed and negative except for those discussed in HPI.       PHYSICAL EXAM    I have reviewed the triage vital signs and nursing notes.    ED Triage Vitals   Temp Heart Rate Resp BP SpO2   2118 21 0918 2118 21 0946 21   97.7 °F (36.5 °C) 85 16 131/80 99 %      Temp src Heart Rate Source Patient Position BP Location FiO2 (%)   21 -- -- --   Temporal Monitor          Physical Exam    Physical Exam   Constitutional: No distress.  Nontoxic appearing  HENT:  Eyes: No scleral icterus. No conjunctival pallor.  Neck: Painless range of motion noted. Neck supple.   Cardiovascular: Normal rate, regular rhythm and intact distal pulses.  Pulmonary/Chest: No respiratory distress.  No tachypnea or increased work of breathing.    Musculoskeletal: Swelling with notable effusion left knee with no erythema or fluctuance.  Pain with range of  motion.  Atraumatic external examination.  Painless range of motion of the right ankle and right hip.  Neurological: Alert.  Baseline strength and sensation noted.   Skin: Skin is pink, warm, and dry. No pallor.   Psychiatric: Mood and affect normal.   Nursing note and vitals reviewed.    LAB RESULTS  Recent Results (from the past 24 hour(s))   Body fluid cell count - Synovial Fluid, Knee, Left    Collection Time: 09/03/21 10:50 AM    Specimen: Knee, Left; Synovial Fluid   Result Value Ref Range    Color, Fluid Yellow     Appearance, Fluid Cloudy (A) Clear    RBC, Fluid 14 /mm3    Nucleated Cells, Fluid 6,550 /mm3    Method: Hemacytometer Method        Ordered the above labs and independently reviewed the results.        RADIOLOGY  No Radiology Exams Resulted Within Past 24 Hours    I ordered the above noted radiological studies. Reviewed by me and discussed with radiologist.  See dictation for official radiology interpretation.      PROCEDURES    Arthocentesis    Date/Time: 9/3/2021 10:51 AM  Performed by: Edward Gomez MD  Authorized by: Edward Gomez MD     Consent:     Consent obtained:  Verbal    Consent given by:  Patient    Risks discussed:  Bleeding, incomplete drainage, infection and pain    Alternatives discussed:  No treatment and delayed treatment  Location:     Location:  Knee    Knee:  L knee  Anesthesia (see MAR for exact dosages):     Anesthesia method:  Local infiltration    Local anesthetic:  Lidocaine 1% w/o epi  Procedure details:     Preparation: Patient was prepped and draped in usual sterile fashion      Needle gauge:  18 G    Approach:  Medial    Aspirate characteristics:  Yellow and serous    Steroid injected: yes      Specimen collected: yes    Post-procedure details:     Dressing:  Adhesive bandage    Patient tolerance of procedure:  Tolerated well, no immediate complications            MEDICATIONS GIVEN IN ER    Medications   lidocaine (XYLOCAINE) 1 % injection 10 mL (10 mL Injection  Given 9/3/21 1051)   triamcinolone acetonide (KENALOG-40) injection 40 mg (40 mg Intra-articular Given 9/3/21 1051)         PROGRESS, DATA ANALYSIS, CONSULTS, AND MEDICAL DECISION MAKING    My diagnosis for lower extremity pain and injury includes but is not limited to hip fracture, femur fracture, hip dislocation, hip contusion, hip sprain, hip strain, pelvic fracture, knee sprain, patella dislocation, knee dislocation, internal derangement of knee, fractures of the femur, tibia, fibula, ankle, foot and digits, ankle sprain, ankle dislocation, Lisfranc fracture, fracture dislocations of the digits, pulmonary embolism, claudication, peripheral vascular disease, gout, osteoarthritis, rheumatoid arthritis, bursitis, septic joint, poly-rheumatica, polyarthralgia and other inflammatory or infectious disease processes.      All labs have been independently reviewed by me.  All radiology studies have been reviewed by me and discussed with radiologist dictating the report.   EKG's independently viewed and interpreted by me.  Discussion below represents my analysis of pertinent findings related to patient's condition, differential diagnosis, treatment plan and final disposition.      ED Course as of Sep 03 1315   Fri Sep 03, 2021   1315 Does not meet criteria for septic arthritis.  Discharge home with outpatient follow-up.   Nucleated Cells, Fluid: 6,550 [RS]      ED Course User Index  [RS] Edward Gomez MD       AS OF 13:15 EDT VITALS:    BP - 131/80  HR - 85  TEMP - 97.7 °F (36.5 °C) (Temporal)  O2 SATS - 99%        DIAGNOSIS  Final diagnoses:   Effusion, left knee   Osteoarthritis of left knee, unspecified osteoarthritis type   Acute pain of left knee         DISPOSITION  DISCHARGE    Patient discharged in stable condition.    Reviewed implications of results, diagnosis, meds, responsibility to follow up, warning signs and symptoms of possible worsening, potential complications and reasons to return to  ER.    Patient/Family voiced understanding of above instructions.    Discussed plan for discharge, as there is no emergent indication for admission. Patient referred to primary care provider for regular health maintenance. Pt/family is agreeable and understands need for follow up and possible repeat testing.  Pt is aware that discharge does not mean that nothing is wrong but it indicates no emergency is present that requires admission and they must continue care with follow-up as given below or physician of their choice.     FOLLOW-UP  Guillermo Moran MD  [address]  [address]  [facility] [id number] [phone]    Schedule an appointment as soon as possible for a visit   As needed    Mayank Duenas MD  [address] [facility]  [address]  [place] [id number] [phone]    Schedule an appointment as soon as possible for a visit in 1 week  Reevaluation and planning for further treatment of left knee discomfort         Medication List      No changes were made to your prescriptions during this visit.            Edward Gomez MD  09/03/21 0019

## 2021-09-03 NOTE — ED TRIAGE NOTES
Left knee since Friday one week PTA. Pt denies injury. States he cannot bear weight on the knee. Had XR done at [facility] this week negative for fracture.     Patient was wearing a face mask when I entered the room and they continued to wear a mask throughout our encounter. I wore PPE including gloves, eye protection, and a surgical mask whenever I was in the room with patient. Hand hygiene performed.

## 2021-09-05 LAB — CHOLEST CRY FLD QL MICRO: NORMAL

## 2021-09-07 NOTE — SIGNIFICANT NOTE
Patient educated on the following :    - If you are receiving Sedation for your procedure Nothing to Eat 6 hours and only clear liquids for 2 hours prior to your procedure.    -Your required COVID Test is Scheduled on          Between the Hours of [id number]  -You will only be notified if your COVID test Result is POSITIVE  -The importance of reducing your number of contacts by self quarantining after you COVID test until the date of your PROCEDURE  -You will need to have someone drive you home after your PROCEDURE and remain with you for 24 hours after the PROCEDURE  - The date of your procedure, your are welcome to have one visitor at bedside or remain within 10-15 minutes of [facility]  -You will need to arrive at      1030     on      9/10/21     for your PROCEDURE  -Please contact [facility] at: [phone] with any questions and/or concerns

## 2021-09-08 LAB
BACTERIA FLD CULT: NORMAL
GRAM STN SPEC: NORMAL

## 2021-09-09 NOTE — DISCHARGE INSTRUCTIONS
[facility] - Post-procedure Instructions          --  While there are no absolute restrictions, it is recommended that you do not perform strenuous activity today. In the morning, you may resume your level of activity as before your block.    --  If you have a band-aid at your injection site, please remove it later today. Observe the area for any redness, swelling, pus-like drainage, or a temperature over 101°. If any of these symptoms occur, please call your doctor at [phone]. If after office hours, leave a message and the on-call provider will return your call.    --  Ice may be applied to your injection site. It is recommended you avoid direct heat (heating pad; hot tub) for 1-2 days.    --  Call [facility] at [phone] if you experience persistent headache, persistent bleeding from the injection site, or severe pain not relieved by heat or oral medication.    --  Do not make important decisions today.    --  Due to the effects of the block and/or the I.V. Sedation, DO NOT drive or operate hazardous machinery for 12 hours.  Local anesthetics may cause numbness after procedure and precautions must be taken with regards to operating equipment as well as with walking, even if ambulating with assistance of another person or with an assistive device.    --  Do not drink alcohol for 12 hours.    -- You may return to work tomorrow, or as directed by your referring doctor.    --  Occasionally you may notice a slight increase in your pain after the procedure. This should start to improve within the next 24-48 hours. Radiofrequency ablation procedure pain may last 3-4 weeks.    --  It may take as long as 3-4 days before you notice a gradual improvement in your pain and/or other symptoms.    -- You may continue to take your prescribed pain medication as needed.    --  Some normal possible side effects of steroid use could include fluid retention, increased blood sugar, dull headache,  increased sweating, increased appetite, mood swings and flushing.    --  Diabetics are recommended to watch their blood glucose level closely for 24-48 hours after the injection.    --  Must stay in PACU for 20 min upon arrival and prove no leg weakness before being discharged.    --  IN THE EVENT OF A LIFE THREATENING EMERGENCY, (CHEST PAIN, BREATHING DIFFICULTIES, PARALYSIS…) YOU SHOULD GO TO YOUR NEAREST EMERGENCY ROOM.    --  You should be contacted by our office within 2-3 days to schedule follow up or next appointment date.  If not contacted within 7 days, please call the office at [phone]

## 2021-09-10 ENCOUNTER — HOSPITAL ENCOUNTER (OUTPATIENT)
Dept: GENERAL RADIOLOGY | Facility: SURGERY CENTER | Age: 76
Setting detail: HOSPITAL OUTPATIENT SURGERY
End: 2021-09-10

## 2021-09-10 ENCOUNTER — HOSPITAL ENCOUNTER (OUTPATIENT)
Facility: SURGERY CENTER | Age: 76
Setting detail: HOSPITAL OUTPATIENT SURGERY
Discharge: HOME | End: 2021-09-10
Attending: ANESTHESIOLOGY | Admitting: ANESTHESIOLOGY

## 2021-09-10 VITALS
DIASTOLIC BLOOD PRESSURE: 70 MMHG | WEIGHT: 278 LBS | BODY MASS INDEX: 37.65 KG/M2 | SYSTOLIC BLOOD PRESSURE: 129 MMHG | RESPIRATION RATE: 16 BRPM | HEIGHT: 72 IN | TEMPERATURE: 97.8 F | HEART RATE: 58 BPM | OXYGEN SATURATION: 97 %

## 2021-09-10 DIAGNOSIS — M48.062 SPINAL STENOSIS OF LUMBAR REGION WITH NEUROGENIC CLAUDICATION: ICD-10-CM

## 2021-09-10 DIAGNOSIS — M54.16 LUMBAR RADICULOPATHY: ICD-10-CM

## 2021-09-10 PROCEDURE — 0 IOHEXOL 300 MG/ML SOLUTION 10 ML VIAL: Performed by: ANESTHESIOLOGY

## 2021-09-10 PROCEDURE — 3E0R33Z INTRODUCTION OF ANTI-INFLAMMATORY INTO SPINAL CANAL, PERCUTANEOUS APPROACH: ICD-10-PCS | Performed by: ANESTHESIOLOGY

## 2021-09-10 PROCEDURE — 25010000002 DEXAMETHASONE SODIUM PHOSPHATE 100 MG/10ML SOLUTION 10 ML VIAL: Performed by: ANESTHESIOLOGY

## 2021-09-10 PROCEDURE — 62323 NJX INTERLAMINAR LMBR/SAC: CPT | Performed by: ANESTHESIOLOGY

## 2021-09-10 PROCEDURE — 76000 FLUOROSCOPY <1 HR PHYS/QHP: CPT

## 2021-09-10 RX ORDER — SODIUM CHLORIDE 0.9 % (FLUSH) 0.9 %
10 SYRINGE (ML) INJECTION AS NEEDED
Status: DISCONTINUED | OUTPATIENT
Start: 2021-09-10 | End: 2021-09-10 | Stop reason: HOSPADM

## 2021-09-10 RX ORDER — SODIUM CHLORIDE 0.9 % (FLUSH) 0.9 %
10 SYRINGE (ML) INJECTION EVERY 12 HOURS SCHEDULED
Status: DISCONTINUED | OUTPATIENT
Start: 2021-09-10 | End: 2021-09-10 | Stop reason: HOSPADM

## 2021-09-10 NOTE — OP NOTE
L2/L3 Interlaminar Lumbar Epidural Steroid Injection   [facility]    PREOPERATIVE DIAGNOSIS:   Chronic low back pain, Lumbar Spinal Stenosis WITH Neurogenic Claudication and Lumbar Radiculopathy  POSTOPERATIVE DIAGNOSIS:  Same as preop diagnosis    PROCEDURE:   Lumbar Epidural Steroid Injection, Therapeutic Interlaminar Injection, with epidurogram, at  L2/L3 level    PRE-PROCEDURE DISCUSSION WITH PATIENT:    Risks and complications were discussed with the patient prior to starting the procedure and informed consent was obtained.  We discussed various topics including but not limited to bleeding, infection, injury, paralysis, nerve injury, dural puncture, coma, death, worsening of clinical picture, lack of pain relief, and postprocedural soreness.    SURGEON:  Irasema Loco MD    REASON FOR PROCEDURE:    Previous clinically significant therapeutic effect is noted., Degenerative changes are noted in the area. and Stenotic area is noted, and is likely contributing to this chronic &/or recurrent pain.     SEDATION:  Patient declined administration of moderate sedation    ANESTHETIC:  Marcaine 0.5%  STEROID:   15mg dexamethasone    DESCRIPTON OF PROCEDURE:    After obtaining informed consent, I.V. was not started in the preop area.   The patient was taken to the operating room and placed in the prone position.  EKG, blood pressure, and pulse oximeter were monitored throughout, and sedation was provided as needed by the RN under my guidance. All pressure points were well padded.  The lumbar spine area was prepped with Chloraprep and draped in a sterile fashion.      AP fluoroscopic image was used to visualize the L2/L3 interspace.  The skin and subcutaneous tissue over the area was anesthetized with 1% Lidocaine.  An 18-Gauge Tuohy needle was then advanced through the anesthetized skin tract under fluoroscopic guidance in a coaxial view using a loss of resistance technique.  Lateral fluoroscopy was  used to verify appropriate needle depth.  Once the needle tip was felt to be in the posterior epidural space, aspiration was noted to be negative for blood or CSF.  A volume of 1mL of Omnipaque 180 was then injected under live fluoroscopy in an AP view which produced good epidural spread with no evidence of loculation, vascular run-off or intrathecal spread.  Subsequently, a total volume of 5mL consisting of 15mg of dexamethasone, 0.5mL of 0.5% bupivcacaine and normal saline was injected without resistance.  The needle was removed intact.     ESTIMATED BLOOD LOSS:  <5 mL  SPECIMENS:  None    COMPLICATIONS:     No complications were noted., There was no indication of vascular uptake on live injection of contrast dye. and There was no indication of intrathecal uptake on live injection of contrast dye.    TOLERANCE & DISCHARGE CONDITION:    The patient tolerated the procedure well.  The patient was transported to the recovery area without difficulties.  The patient was discharged to home under the care of family in stable and satisfactory condition.    PLAN OF CARE:  1. The patient was given our standard instruction sheet.  2. The patient will Return to clinic in 8 wks  3. The patient will resume all medications as per the medication reconciliation sheet.

## 2021-11-16 DIAGNOSIS — R60.9 EDEMA, UNSPECIFIED TYPE: ICD-10-CM

## 2021-11-16 DIAGNOSIS — I10 ESSENTIAL HYPERTENSION: ICD-10-CM

## 2021-11-17 RX ORDER — LISINOPRIL 10 MG/1
10 TABLET ORAL EVERY MORNING
Qty: 90 TABLET | Refills: 1 | Status: SHIPPED | OUTPATIENT
Start: 2021-11-17 | End: 2021-12-29 | Stop reason: SDUPTHER

## 2021-11-17 RX ORDER — FUROSEMIDE 20 MG/1
20 TABLET ORAL EVERY MORNING
Qty: 90 TABLET | Refills: 1 | Status: SHIPPED | OUTPATIENT
Start: 2021-11-17 | End: 2021-12-29 | Stop reason: SDUPTHER

## 2021-11-29 ENCOUNTER — TRANSCRIBE ORDERS (OUTPATIENT)
Dept: SURGERY | Facility: SURGERY CENTER | Age: 76
End: 2021-11-29

## 2021-11-29 ENCOUNTER — PREP FOR SURGERY (OUTPATIENT)
Dept: SURGERY | Facility: SURGERY CENTER | Age: 76
End: 2021-11-29

## 2021-11-29 ENCOUNTER — OFFICE VISIT (OUTPATIENT)
Dept: PAIN MEDICINE | Facility: CLINIC | Age: 76
End: 2021-11-29

## 2021-11-29 VITALS
WEIGHT: 279 LBS | HEART RATE: 75 BPM | RESPIRATION RATE: 20 BRPM | OXYGEN SATURATION: 99 % | SYSTOLIC BLOOD PRESSURE: 144 MMHG | DIASTOLIC BLOOD PRESSURE: 79 MMHG | HEIGHT: 72 IN | TEMPERATURE: 96.4 F | BODY MASS INDEX: 37.79 KG/M2

## 2021-11-29 DIAGNOSIS — M46.1 SACROILIITIS, NOT ELSEWHERE CLASSIFIED (HCC): Primary | ICD-10-CM

## 2021-11-29 DIAGNOSIS — G89.29 OTHER CHRONIC PAIN: Primary | ICD-10-CM

## 2021-11-29 DIAGNOSIS — M48.062 SPINAL STENOSIS OF LUMBAR REGION WITH NEUROGENIC CLAUDICATION: ICD-10-CM

## 2021-11-29 DIAGNOSIS — M54.16 LUMBAR RADICULOPATHY: ICD-10-CM

## 2021-11-29 DIAGNOSIS — M25.562 LEFT KNEE PAIN, UNSPECIFIED CHRONICITY: ICD-10-CM

## 2021-11-29 DIAGNOSIS — M47.816 OSTEOARTHRITIS OF LUMBAR SPINE, UNSPECIFIED SPINAL OSTEOARTHRITIS COMPLICATION STATUS: ICD-10-CM

## 2021-11-29 PROCEDURE — 99214 OFFICE O/P EST MOD 30 MIN: CPT | Performed by: NURSE PRACTITIONER

## 2021-11-29 RX ORDER — SODIUM CHLORIDE 0.9 % (FLUSH) 0.9 %
10 SYRINGE (ML) INJECTION EVERY 12 HOURS SCHEDULED
Status: CANCELLED | OUTPATIENT
Start: 2021-11-29

## 2021-11-29 RX ORDER — SODIUM CHLORIDE 0.9 % (FLUSH) 0.9 %
10 SYRINGE (ML) INJECTION AS NEEDED
Status: CANCELLED | OUTPATIENT
Start: 2021-11-29

## 2021-11-29 NOTE — PROGRESS NOTES
CHIEF COMPLAINT  F/u back and left knee pain. Pt sts left knee and left sciatica pain has worsened since last ov. Pt sts back pain is the same. Pt sts scheduled for LTKR on 1/7/22 in [place].     Subjective   Isaac Suarez is a 76 y.o. male  who presents for follow-up.  He has a history of chronic back pain. Reports this is worse since last evaluation. Also, at last evaluation, was having left knee pain. Sent for xray and orthopedic evaluation.    Patient presents for follow-up of PROCEDURE. Patient had a LESI L2-3 performed by Dr. HUNT on 9-10-21 for management of LOW BACK PAIn. Patient reports 50% ONGOING relief from the procedure.    Is returning to [place] 1-3-21. Anticipates return to [place] near end of April. With last visit to [place], saw orthopedist. Recommended total left knee replacement, but must wait 3 months from cortisone injection patient received at ER in early September.     Complains of pain in his low back and left knee. Today his pain is 7/10VAS.  Describes his knee pain as continuous aching and back pain continuous aching with intermittent sharp and shooting pain down left leg. Goes down back of left leg and stops mid posterior calf. Pain increases with walking, standing, prolonged position; pain decreases with medication, changing position, heat/ice. ADL's by self. Denies any bowel or bladder incontinence.     Patient remained masked during entire encounter. No cough present. I donned a mask and eye protection throughout entire visit. Prior to donning mask and eye protection, hand hygiene was performed, as well as when it was doffed.  I was closer than 6 feet, but not for an extended period of time. No obvious exposure to any bodily fluids.    Back Pain  This is a chronic problem. The current episode started more than 1 year ago. The problem occurs constantly. The problem has been waxing and waning since onset. The pain is present in the lumbar spine and sacro-iliac. The quality of the pain is  described as aching. The pain is at a severity of 7/10. The pain is worse during the day. The symptoms are aggravated by bending, position, standing and twisting. Associated symptoms include numbness (left leg) and weakness (left leg). Pertinent negatives include no bladder incontinence, bowel incontinence, chest pain, fever or headaches.   Knee Pain   The incident occurred 5 to 7 days ago. There was no injury mechanism. The pain is present in the left knee. The quality of the pain is described as stabbing. The pain is at a severity of 9/10. The pain is severe. The pain has been worsening since onset. Associated symptoms include an inability to bear weight, a loss of motion and numbness (left leg). The symptoms are aggravated by movement and weight bearing.      Was initially evaluated as a new patient by Dr. Irasema Loco on 7/27/2021.  He was referred here by his PCP Dr. Li off it for evaluation of back pain.  Previously was seen at [facility] and was unhappy with his previous pain experience.  He states that he wants to avoid opioids unless absolutely necessary.  Baseline urine drug screen was obtained.  Plan for L3-L4 epidural steroid injection.  Plan to follow-up 4 weeks after procedure or sooner if needed.    The patient has a pain history of the following:  Chronic low back pain  Lumbar stenosis   History of Lumbar Surgery   Peripheral neuropathy     Previous interventions that the patient has received include:    L2-L3 LESI 9-10-21-- 50% ONGOING  L2-L3 LESI- 8-6-21-- 80-90% relief for 2 weeks, then 30% ongoing, improved ability to walk  Radiofrequency ablation - helped some   Sacroiliac joint injections 1/3/18, 12/6/17  Right trochanteric bursa injection   Lumbar epidural steroid injections      Pain medications include:  Lyrica   Advil      Previously: Medrol dose pack     Other conservative modalities which the patient reports using include:  Physical Therapy: yes - no improvement  Neck or back  surgery: yes  Past pain management: yes - [facility]     Past Significant Surgical History:  L4-5 fusion - Dr. Frias   Right sacroiliac joint fusion     REVIEW OF PERTINENT MEDICAL DATA            Narrative & Impression   LEFT KNEE: STANDING AP, LATERAL, AND SUNRISE VIEWS     HISTORY:Left knee pain for 5 days. Medial pain.     COMPARISON: None.     FINDINGS: There is medial and patellofemoral compartment joint space  narrowing. Mild marginal osteophyte formation is present. Faint  chondrocalcinosis overlies the lateral compartment. There is a small  superior patellar spur. On the lateral view posterior to the knee at the  level of the joint line there is a 5 cm ossification consistent with an  osteochondral body. There is also an ovoid soft tissue density posterior  to the knee that suggests the presence of a Baker's cyst. No fracture or  acute osseous abnormality is evident. Atherosclerotic calcifications are  present.     IMPRESSION:  Osteoarthritis at the medial and patellofemoral  compartments. Mild chondrocalcinosis. Suspect Baker's cyst with 5 mm  osteochondral body posterior to the knee. Atherosclerotic  calcifications.     This report was finalized on 9/1/2021 11:26 AM by Dr. Richard Mehta M.D.          Imaging    XR Knee 3 View Left (Order: [id number]) - 9/1/2021    Result History    XR Knee 3 View Left (Order #[id number]) on 9/1/2021 - Order Result History Report - Result Edited    Reprint Order Requisition    XR Knee 3 View Left (Order #[id number]) on 9/1/21     XR Knee 3 View Left: Result Notes     ROSIO Roman   9/1/2021  2:43 PM EDT         Done. Thanks. CALLIE Mcpherson RN   9/1/2021  2:30 PM EDT         Called and informed pt of results today. He verbalized understanding. He would like referral to orthopedist. Can you please order referral? Thank you.     ROSIO Roman   9/1/2021  2:22 PM EDT         Left knee xr shows nothing acute such as fracture. Shows arthritis  "on inside and top of knee. THere also appears to be a baker's cyst in the posterior knee. Can refer to orthopedist if he wishes. Let me know. Thanks. BB         PEG Assessment   What number best describes your pain on average in the past week?7  What number best describes how, during the past week, pain has interfered with your enjoyment of life?7  What number best describes how, during the past week, pain has interfered with your general activity?  7    The following portions of the patient's history were reviewed and updated as appropriate: allergies, current medications, past family history, past medical history, past social history, past surgical history and problem list.    Review of Systems   Constitutional: Positive for activity change (dec). Negative for fatigue and fever.   HENT: Negative for congestion.    Eyes: Negative for visual disturbance.   Respiratory: Negative for cough and shortness of breath.    Cardiovascular: Negative for chest pain.   Gastrointestinal: Negative for bowel incontinence, constipation and diarrhea.   Genitourinary: Negative for bladder incontinence and difficulty urinating.   Musculoskeletal: Positive for arthralgias (left knee and left sciatica), back pain and gait problem (cane). Negative for joint swelling.   Neurological: Positive for weakness (left leg) and numbness (left leg). Negative for dizziness, light-headedness and headaches.   Psychiatric/Behavioral: Positive for sleep disturbance. Negative for agitation and suicidal ideas. The patient is not nervous/anxious.      I have reviewed and confirmed the accuracy of the ROS as documented by the MA/LPN/RN ROSIO Mcfarland      Vitals:    11/29/21 0842   BP: 144/79   Pulse: 75   Resp: 20   Temp: 96.4 °F (35.8 °C)   SpO2: 99%   Weight: 127 kg (279 lb)   Height: 182.9 cm (72\")   PainSc:   7   PainLoc: Knee  Comment: left       Objective   Physical Exam  Vitals and nursing note reviewed.   Constitutional:       Appearance: " He is well-developed.   HENT:      Head: Normocephalic and atraumatic.   Musculoskeletal:      Lumbar back: Tenderness and bony tenderness present. Decreased range of motion. Negative right straight leg raise test and negative left straight leg raise test.      Left knee: Swelling and bony tenderness present. Decreased range of motion.      Comments: Surgical scar of lumbar spine    Exquisite tenderness of left SI joint, negative on right  Negative lumbar facet tenderness    Unable to perform NOHEMI due to patient's knee pain. +ANNE-MARIE on left, negative on right   Neurological:      Mental Status: He is alert.      Gait: Gait abnormal (walking with cane--- due to knee pain).   Psychiatric:         Speech: Speech normal.         Behavior: Behavior normal.         Thought Content: Thought content normal.         Judgment: Judgment normal.         Assessment/Plan   Diagnoses and all orders for this visit:    1. Other chronic pain (Primary)    2. Spinal stenosis of lumbar region with neurogenic claudication    3. Osteoarthritis of lumbar spine, unspecified spinal osteoarthritis complication status    4. Lumbar radiculopathy    5. Left knee pain, unspecified chronicity      --- Continue with orthopedic plans.  --- Left Si joint injection. No blood thinners. Reviewed the procedure at length with the patient.  Included in the review was expectations, complications, risk and benefits.The procedure was described in detail and the risks, benefits and alternatives were discussed with the patient (including but not limited to: bleeding, infection, nerve damage, worsening of pain, inability to perform injection, paralysis, seizures, coma, no pain relief and death) who agreed to proceed.  Discussed the potential for sedation if warranted/wanted.  The procedure will plan to be performed at [facility] with fluoroscopic guidance(unless ultrasound is indicated) and could potentially have steroids and contrast dye  used. Questions were answered and in a way the patient could understand.  Patient verbalized understanding and wishes to proceed.  This intervention will be ordered.  Discussed with patient that all procedures are part of a multimodal plan of care and include either formal PT or a home exercise program.  Patient has no evidence of coagulopathy or current infection.    --- Follow-up after procedure or sooner if needed    ----------  Education about Sacroiliac joint injections:  This Sacroiliac joint injection (blockade) we have suggested is intended for diagnostic purposes, with the intent of offering the patient Radiofrequency thermal rhizotomy (RF) of the sensory branches to the joint if the block is diagnostically effective.  The diagnostic blockade is necessary to determine the likelihood that RF therapy could be efficacious in providing long term relief to the patient.    In this procedure, the sacroiliac joint is aligned with imaging, and under image guidance a needle is placed with the needle tip into the joint.  The needle position is confirmed to be appropriately in the joint before injection of medication into the joint.  When xray fluoroscopy is used, contrast dye is used to confirm a proper arthrogram (i.e., outline of the joint).  When ultrasound is used, IV fluid (normal saline) is injected to see the flow of the fluid into the joint.  Once confirmed, then the medication can be injected into the joint.  Oftentimes this medication is a combination of local anesthetics (for diagnostic purposes) and also a steroid (to decrease irritation & inflammation in the joint, also known as sacroilitis).      Medically, a successful RF procedure should provide a patient with 50% pain relief or more for at least 6 months.  Clinical experience suggests that successful patients receive relief more in the range of 12 months on average.  We also discussed that many patients receive therapeutic success from the  intraarticular joint injection, and may not require RF ablation.  If a patient receives more than 8 weeks of relief from joint injection, then occasional repeat joint blocks for therapeutic purposes is a very reasonable alternative therapy.  This course of therapy is consistent with our LCDs according to our CMS  in the area, and therefore other insurance providers should follow accordingly.  We will monitor our patients to screen for these therapeutic responders and will offer RF therapy only when necessary.      We discussed that joint injections & also RF procedures are not without risks.  Best practices regarding anticoagulant use & neuraxial procedures will be respected.  Oftentimes a patient on an anticoagulant can be offered a joint injection safely, but again this is not risk-free, and such patients give consent with regards to this increased bleeding risk, which could cause problems including but not limited to worsening of pain, nerve damage, or muscle damage.  Patients that are ill or otherwise may be at risk for sepsis will not have their spines accessed by neuraxial injections of any type.  This patient will not be offered these therapies if there is an increased risk.   We discussed that there is a risk of postprocedural pain and also a risk of worsening of clinical picture with these procedures as with any neuraxial procedure.    ----------        NEHA REPORT  As the clinician, I personally reviewed the NEHA from 11-29-21 while the patient was in the office today.           Dictated utilizing Dragon dictation.

## 2021-12-02 ENCOUNTER — OFFICE VISIT (OUTPATIENT)
Dept: FAMILY MEDICINE CLINIC | Facility: CLINIC | Age: 76
End: 2021-12-02

## 2021-12-02 VITALS
BODY MASS INDEX: 37.68 KG/M2 | TEMPERATURE: 98 F | DIASTOLIC BLOOD PRESSURE: 72 MMHG | OXYGEN SATURATION: 99 % | HEIGHT: 72 IN | WEIGHT: 278.2 LBS | SYSTOLIC BLOOD PRESSURE: 142 MMHG | HEART RATE: 65 BPM

## 2021-12-02 DIAGNOSIS — Z01.818 PREOPERATIVE TESTING: Primary | ICD-10-CM

## 2021-12-02 DIAGNOSIS — I10 PRIMARY HYPERTENSION: ICD-10-CM

## 2021-12-02 DIAGNOSIS — N18.9 CHRONIC RENAL INSUFFICIENCY, UNSPECIFIED STAGE: ICD-10-CM

## 2021-12-02 DIAGNOSIS — R74.01 ELEVATED TRANSAMINASE LEVEL: ICD-10-CM

## 2021-12-02 DIAGNOSIS — E78.5 HYPERLIPIDEMIA, UNSPECIFIED HYPERLIPIDEMIA TYPE: ICD-10-CM

## 2021-12-02 PROCEDURE — 99214 OFFICE O/P EST MOD 30 MIN: CPT | Performed by: NURSE PRACTITIONER

## 2021-12-02 PROCEDURE — 93000 ELECTROCARDIOGRAM COMPLETE: CPT | Performed by: NURSE PRACTITIONER

## 2021-12-02 NOTE — PROGRESS NOTES
"Chief Complaint  Pre-op Exam (having knee surgery in 1/2022)    Subjective     {Problem List  Visit Diagnosis   Encounters  Notes  Medications  Labs  Result Review Imaging  Media :23}     Isaac Suarez presents to [facility]  This is my first time seeing this patient.    Patient started with left knee pain in August 2021.  Patient was moving dirt and doing yard work in August.  Next day after doing yard work started to walk with a limp because his left knee was giving him pain.  The limp started to progress further and he acquired walking with a cane.  Patient was seen in the ER in September, the physician took fluid off the knee, and sent patient to orthopedics.  Orthopedics did x-rays which showed that patient's knee was bone-on-bone and would require gel shots.  Patient did not get the gel shots, but did receive a cortisone shot to the left knee at the end of September. Then patient was seen down in [place] by a different orthopedic doctor.  The [place] orthopedic doctor recommended surgery but stated that the surgery had to be completed 3 months after receiving the cortisone shot in the left knee.  Patient is now scheduled to have left knee surgery on 1/7/22.  Patient presents today for preadmission testing for the knee surgery.  Patient still continues to walk with a cane regularly.      Objective   Vital Signs:   /72 (BP Location: Left arm, Patient Position: Sitting, Cuff Size: Large Adult)   Pulse 65   Temp 98 °F (36.7 °C) (Infrared)   Ht 182.9 cm (72\")   Wt 126 kg (278 lb 3.2 oz)   SpO2 99%   BMI 37.73 kg/m²     Physical Exam  Vitals reviewed.   Constitutional:       General: He is awake. He is not in acute distress.     Appearance: Normal appearance.   HENT:      Head: Normocephalic.      Right Ear: Hearing, tympanic membrane, ear canal and external ear normal.      Left Ear: Hearing, tympanic membrane, ear canal and external ear normal.      " Mouth/Throat:      Mouth: Mucous membranes are moist.      Tongue: Tongue does not deviate from midline.      Pharynx: Oropharynx is clear.   Eyes:      General: Lids are normal. Vision grossly intact.      Pupils: Pupils are equal, round, and reactive to light.   Neck:      Thyroid: No thyroid mass or thyroid tenderness.      Vascular: No carotid bruit or JVD.   Cardiovascular:      Rate and Rhythm: Normal rate and regular rhythm.      Pulses: Normal pulses.           Radial pulses are 2+ on the right side and 2+ on the left side.        Dorsalis pedis pulses are 2+ on the right side and 2+ on the left side.        Posterior tibial pulses are 2+ on the right side and 2+ on the left side.      Heart sounds: Normal heart sounds.   Pulmonary:      Effort: Pulmonary effort is normal.      Breath sounds: Normal breath sounds.   Abdominal:      General: Abdomen is flat. Bowel sounds are normal.      Palpations: Abdomen is soft.      Tenderness: There is no abdominal tenderness.   Musculoskeletal:      Cervical back: Neck supple.      Right knee: No swelling. No tenderness.      Left knee: No swelling. Decreased range of motion. No tenderness.   Lymphadenopathy:      Cervical: No cervical adenopathy.   Skin:     General: Skin is warm and dry.      Capillary Refill: Capillary refill takes less than 2 seconds.   Neurological:      General: No focal deficit present.      Mental Status: He is alert.   Psychiatric:         Attention and Perception: Attention normal.         Mood and Affect: Mood normal.         Speech: Speech normal.         Behavior: Behavior is cooperative.        Result Review :   The following data was reviewed by: ROSIO Carpenter on 12/02/2021:  CMP    CMP 12/28/20 1/5/21   Glucose 100 (A) 98   BUN 30 (A) 33 (A)   Creatinine 1.44 (A) 1.75 (A)   eGFR Non  Am 48 (A) 38 (A)   eGFR  Am 58 (A) 46 (A)   Sodium 143 136   Potassium 5.4 (A) 4.8   Chloride 108 (A) 101   Calcium 10.1 10.0    Total Protein 7.0    Albumin 4.30    Globulin 2.7    Total Bilirubin 0.3    Alkaline Phosphatase 69    AST (SGOT) 21    ALT (SGPT) 18    (A) Abnormal value       Comments are available for some flowsheets but are not being displayed.           Lipid Panel    Lipid Panel 12/28/20   Total Cholesterol 319 (A)   Triglycerides 227 (A)   HDL Cholesterol 46   VLDL Cholesterol 46 (A)   LDL Cholesterol  227 (A)   (A) Abnormal value       Comments are available for some flowsheets but are not being displayed.           Data reviewed: Radiologic studies xr knee 9/1/21     ECG 12 Lead    Date/Time: 12/2/2021 9:30 AM  Performed by: Riky Cisneros APRN  Authorized by: Riky Cisneros APRN   Comparison: not compared with previous ECG   Rhythm: sinus rhythm  Rate: normal  Conduction: conduction normal  QRS axis: normal    Clinical impression: normal ECG          Assessment and Plan    Diagnoses and all orders for this visit:    1. Preoperative testing (Primary)  -     Protime-INR; Future  -     CBC No Differential; Future  -     XR Chest PA & Lateral; Future  -     ECG 12 Lead  -     MRSA Screen Culture (Outpatient) - Swab, Nares  -     Comprehensive Metabolic Panel; Future    2. Elevated transaminase level  -     Protime-INR; Future  -     Comprehensive Metabolic Panel; Future    3. Hyperlipidemia, unspecified hyperlipidemia type  -     Lipid Panel; Future    4. Primary hypertension  -     Comprehensive Metabolic Panel; Future    5. Chronic renal insufficiency, unspecified stage   -     Protime-INR; Future    Ordered preoperative lab work, patient will have completed at next appointment with Dr. Moran on 12/29/2021.  EKG and MRSA culture completed at this visit.  Copy of EKG given to patient to be taken to [place] to present to surgeon for surgery.  Will give copy of results of lab work to patient prior to leaving for [place] for surgery.    I spent 30 minutes caring for Isaac on this date of service. This time includes  time spent by me in the following activities:preparing for the visit, reviewing tests, obtaining and/or reviewing a separately obtained history, performing a medically appropriate examination and/or evaluation , counseling and educating the patient/family/caregiver, ordering medications, tests, or procedures, documenting information in the medical record and care coordination     Follow Up   Return in about 27 days (around 12/29/2021) for Next scheduled follow up.  Patient was given instructions and counseling regarding his condition or for health maintenance advice. Please see specific information pulled into the AVS if appropriate.

## 2021-12-03 NOTE — SIGNIFICANT NOTE
Patient educated on the following :    - If you are receiving Sedation for your procedure Nothing to Eat 6 hours and only clear liquids for 2 hours prior to your procedure.    -Your required COVID Test is Scheduled onBetween the Hours of [id number]  -You will only be notified if your COVID test Result is POSITIVE  -The importance of reducing your number of contacts by self quarantining after you COVID test until the date of your PROCEDURE  -You will need to have someone drive you home after your PROCEDURE and remain with you for 24 hours after the PROCEDURE  - The date of your procedure, your are welcome to have one visitor at bedside or remain within 10-15 minutes of [facility]  -You will need to arrive at 1230 on 12/8/21 PROCEDURE  -Please contact [facility] at: [phone] with any questions and/or concerns

## 2021-12-03 NOTE — PATIENT INSTRUCTIONS
[facility]   at the [facility]   Otolaryngology Clinic     Patient: Willis Mcnulty    MRN: [id number]    : 1979    Age/Gender: 42 year old male  Date of Service: 12/3/2021  Rendering Provider:   iVta Patel MD     Referring Provider   PCP: Virgie Ramos  Referring Physician: Noel Haines MD  Reason for Consultation   Chronic throat clearing  History   HISTORY OF PRESENT ILLNESS: I was asked to consult on Willis Mcnulty, by Dr Noel Haines for evaluation of throat clearing . Mr. Mcnulty is a 42 year old male who presents to us today with throat clearing.        he presents today for evaluation. he reports:    Dysphonia  - denies    Dysphagia  - denies   - able to eat a sandwich    Dyspnea  - denies  - stopped smoking over 1 year ago    Throat clearing/cough  - for the past 1.5 years  - woke up in April with sensation of something caught in the back of his nose  - feels like his left nostril is plugged  - he then has to get it out and dry heaves and can't always get it out   - he has to gag a lot  - he sometimes vomits  - and then the irritation stays all day\  - this is very bothersome  - he throat clears  - this is not worse with talking  - it can be worse with physical activity  - always worse in the morning  - feels a globus sensation that he just cant swallow      GERD/LPRD   - had EGD in February with finding of esophagitis  - got started on PPI  - no longer has reflux  - denies AM sore throat       PAST MEDICAL HISTORY: No past medical history on file.    PAST SURGICAL HISTORY:   Past Surgical History:   Procedure Laterality Date     ORTHOPEDIC SURGERY         CURRENT MEDICATIONS:   Current Outpatient Medications:      carvedilol (COREG) 6.25 MG tablet, Take 12.5 mg by mouth 2 times daily, Disp: , Rfl:      IBUPROFEN PO, Take 400 mg by mouth as needed for moderate pain, Disp: , Rfl:      LORazepam (ATIVAN) 0.5 MG tablet, TAKE 0.5MG TO 1 MG BY MOUTH ONCE DAILY, Disp: , Rfl:  Exercising to Lose Weight  Exercising can help you to lose weight. In order to lose weight through exercise, you need to do vigorous-intensity exercise. You can tell that you are exercising with vigorous intensity if you are breathing very hard and fast and cannot hold a conversation while exercising.  Moderate-intensity exercise helps to maintain your current weight. You can tell that you are exercising at a moderate level if you have a higher heart rate and faster breathing, but you are still able to hold a conversation.  HOW OFTEN SHOULD I EXERCISE?  Choose an activity that you enjoy and set realistic goals. Your health care provider can help you to make an activity plan that works for you. Exercise regularly as directed by your health care provider. This may include:  · Doing resistance training twice each week, such as:    Push-ups.    Sit-ups.    Lifting weights.    Using resistance bands.  · Doing a given intensity of exercise for a given amount of time. Choose from these options:    150 minutes of moderate-intensity exercise every week.    75 minutes of vigorous-intensity exercise every week.    A mix of moderate-intensity and vigorous-intensity exercise every week.  Children, pregnant women, people who are out of shape, people who are overweight, and older adults may need to consult a health care provider for individual recommendations. If you have any sort of medical condition, be sure to consult your health care provider before starting a new exercise program.  WHAT ARE SOME ACTIVITIES THAT CAN HELP ME TO LOSE WEIGHT?   · Walking at a rate of at least 4.5 miles an hour.  · Jogging or running at a rate of 5 miles per hour.  · Biking at a rate of at least 10 miles per hour.  · Lap swimming.  · Roller-skating or in-line skating.  · Cross-country skiing.  · Vigorous competitive sports, such as football, basketball, and soccer.  · Jumping rope.  · Aerobic dancing.  HOW CAN I BE MORE ACTIVE IN MY DAY-TO-DAY       losartan (COZAAR) 100 MG tablet, Take 100 mg by mouth daily, Disp: , Rfl:      oxyCODONE-acetaminophen (PERCOCET) 5-325 MG per tablet, Take 1-2 tablets by mouth every 4 hours as needed for pain, Disp: 15 tablet, Rfl: 0    ALLERGIES: Patient has no known allergies.    SOCIAL HISTORY:    Social History     Socioeconomic History     Marital status: Single     Spouse name: Not on file     Number of children: Not on file     Years of education: Not on file     Highest education level: Not on file   Occupational History     Not on file   Tobacco Use     Smoking status: Current Every Day Smoker     Packs/day: 0.25     Smokeless tobacco: Not on file   Substance and Sexual Activity     Alcohol use: Yes     Comment: daily     Drug use: No     Sexual activity: Not on file   Other Topics Concern     Not on file   Social History Narrative     Not on file     Social Determinants of Health     Financial Resource Strain: Not on file   Food Insecurity: Not on file   Transportation Needs: Not on file   Physical Activity: Not on file   Stress: Not on file   Social Connections: Not on file   Intimate Partner Violence: Not on file   Housing Stability: Not on file         FAMILY HISTORY: No family history on file.  Non-contributory for problems with anesthesia    REVIEW OF SYSTEMS:   The patient was asked a 14 point review of systems regarding constitutional symptoms, eye symptoms, ears, nose, mouth, throat symptoms, cardiovascular symptoms, respiratory symptoms, gastrointestinal symptoms, genitourinary symptoms, musculoskeletal symptoms, integumentary symptoms, neurological symptoms, psychiatric symptoms, endocrine symptoms, hematologic/lymphatic symptoms, and allergic/ immunologic symptoms.   The pertinent factors have been included in the HPI and below.  Patient Supplied Answers to Review of Systems  No flowsheet data found.    Physical Examination   The patient underwent a physical examination as described below. The pertinent  ACTIVITIES?  · Use the stairs instead of the elevator.  · Take a walk during your lunch break.  · If you drive, park your car farther away from work or school.  · If you take public transportation, get off one stop early and walk the rest of the way.  · Make all of your phone calls while standing up and walking around.  · Get up, stretch, and walk around every 30 minutes throughout the day.  WHAT GUIDELINES SHOULD I FOLLOW WHILE EXERCISING?  · Do not exercise so much that you hurt yourself, feel dizzy, or get very short of breath.  · Consult your health care provider prior to starting a new exercise program.  · Wear comfortable clothes and shoes with good support.  · Drink plenty of water while you exercise to prevent dehydration or heat stroke. Body water is lost during exercise and must be replaced.  · Work out until you breathe faster and your heart beats faster.     This information is not intended to replace advice given to you by your health care provider. Make sure you discuss any questions you have with your health care provider.     Document Released: 01/20/2012 Document Revised: 01/08/2016 Document Reviewed: 05/21/2015  [employer] Interactive Patient Education ©2016 [employer]     positive and negative findings are summarized after the description of the examination.  Constitutional: The patient's developmental and nutritional status was assessed. The patient's voice quality was assessed.  Head and Face: The head and face were inspected for deformities. The sinuses were palpated. The salivary glands were palpated. Facial muscle strength was assessed bilaterally.  Eyes: Extraocular movements and primary gaze alignment were assessed.  Ears, Nose, Mouth and Throat: The ears and nose were examined for deformities. The nasal septum, mucosa, and turbinates were inspected by anterior rhinoscopy. The lips, teeth, and gums were examined for abnormalities. The oral mucosa, tongue, palate, tonsils, lateral and posterior pharynx were inspected for the presence of asymmetry or mucosal lesions.    Neck: The tracheal position was noted, and the neck mass palpated to determine if there were any asymmetries, abnormal neck masses, thyromegally, or thyroid nodules.  Respiratory: The nature of the breathing and chest expansion/symmetry was observed.  Cardiovascular: The patient was examined to determine the presence of any edema or jugular venous distension.  Abdomen: The contour of the abdomen was noted.  Lymphatic: The patient was examined for infraclavicular lymphadenopathy.  Musculoskeletal: The patient was inspected for the presence of skeletal deformities.  Extremities: The extremities were examined for any clubbing or cyanosis.  Skin: The skin was examined for inflammatory or neoplastic conditions.  Neurologic: The patient's orientation, mood, and affect were noted. The cranial nerve  functions were examined.  Other pertinent positive and negative findings on physical examination:      OC/OP: no lesions, uvula midline, soft palate elevates symmetrically, FOM/BOT soft  Neck: no lesions, no TH tenderness to palpation    All other physical examination findings were within normal limits and  noncontributory.  Procedures   Flexible laryngoscopy (CPT 02349)      Pre-procedure diagnosis: throat clearing  Post-procedure diagnosis: same as above  Indication for procedure: Mr. Mcnulty is a 42 year old male with see above  Procedure(s): Fiberoptic Laryngoscopy    Details of Procedure: After informed consent was obtained, the patient was seated in the examination chair.  The areas of the nasopharynx as well as the hypopharynx were anesthetized with topical 4% lidocaine with 0.25% phenylephrine atomizer.  Examination of the base of tongue was performed first.  Attention was directed to any evidence of masses in the area or evidence of leukoplakia or candidal infection.  Attention was directed to the epiglottis where its size and position was determined and its movement on phonation of the vowel  e .  The piriform sinuses were then inspected for any mass lesions or pooling of secretions.  Attention was then directed to the larynx. The vocal folds were inspected for infection or any areas of leukoplakia, for masses, polypoid degeneration, or hemorrhage.  Having done this, the arytenoids and vocal processes were inspected for erythema or evidence of granuloma formation.  The posterior commissure was then inspected for evidence of inflammatory changes in the mucosa and heaping up of mucosal tissue. The patient was then instructed to say the vowel  e .  Adduction of vocal folds to the midline was observed for any evidence of paresis or paralysis of the larynx or asymmetry in rotation of the larynx to the left or right. The patient was asked to breathe and the degree of abduction was noted bilaterally.  Subglottic view of the larynx was obtained for any additional mass lesions or mucosal changes.  Finally the post cricoid was examined for evidence of pooling of secretions, as well as the pharyngeal wall mucosa.   Anesthesia type: 0.25% phenylephrine    Findings:  Anatomic/physiological deviations: right OMC mucus, left  septal deviation, supraglottic hyperfunction   Right vocal process: No restriction of mobility   Left vocal process: No restriction of mobility  Glottal gap: Complete glottal closure  Supraglottic structures: Normal  Hypopharynx: Normal     Estimated Blood Loss: minimal  Complications: None  Disposition: Patient tolerated the procedure well                    Review of Relevant Clinical Data   I personally reviewed:  Notes: 21 - Jv Pool 20 -     Procedures: EGD 21 - esophagitis  Labs:  No results found for: TSH  Lab Results   Component Value Date     06/10/2021    CO2 25 06/10/2021    BUN 11 06/10/2021     Lab Results   Component Value Date    WBC 5.8 06/10/2021    HGB 12.9 (L) 06/10/2021    HCT 37.7 (L) 06/10/2021    MCV 99 06/10/2021     06/10/2021     Lab Results   Component Value Date    PTT 27 2006    INR 1.01 2006     No results found for: MARK  No components found for: RHEUMATOIDFACTOR,  RF  No results found for: CRP  No components found for: CKTOT, URICACID  No components found for: C3, C4, DSDNAAB, NDNAABIFA  No results found for: MPOAB    Patient reported Quality of Life (QOL) Measures   Patient Supplied Answers To VHI Questionnaire  No flowsheet data found.      Patient Supplied Answers To EAT Questionnaire  No flowsheet data found.      Patient Supplied Answers To CSI Questionnaire  No flowsheet data found.         Impression & Plan     IMPRESSION: Mr. Mcnulty is a 42 year old male who is being seen for the followin. Chronic throat clearing   - started 1.5 years ago   - has worsened overtime  - associated with sensation of mucus stuck in the back of his nose   - quit 1.5 years ago cigarettes  - allergy triggers and work up: denies  - pulmonary triggers and work up: sometimes worse with physical activity  - GI triggers and work up: does have a history of EGD with esophagitis, but no reflux symptoms, working with [employer], no AM LPRD  - ENT triggers  and work up: associated with nasal symptoms  - scope shows nasal findings of right OMC mucus and septal deviation  - symptoms likely due to nasal etiology and with irritable larynx syndrome added on top given his daily throat clearing  Plan  - nasal saline lavage followed by flonase   - CT sinus and follow up with Dr Mack  - if no improvement after nasal work up - needs throat clearing suppression therapy      RETURN VISIT: as needed after rhinology work up     Thank you for the kind referral and for allowing me to share in the care of Mr. Mcnulty. If you have any questions, please do not hesitate to contact me.    Vita Patel MD    Laryngology    [facility]  Department of  Otolaryngology - Head and Neck Surgery  Clinics & Surgery Center  [address] [place] [address]  Appointment line: [phone]  Fax: [phone]  [email or web address]

## 2021-12-05 LAB — MRSA SPEC QL CULT: NEGATIVE

## 2021-12-07 NOTE — DISCHARGE INSTRUCTIONS
[facility] - Post-procedure Instructions          --  While there are no absolute restrictions, it is recommended that you do not perform strenuous activity today. In the morning, you may resume your level of activity as before your block.    --  If you have a band-aid at your injection site, please remove it later today. Observe the area for any redness, swelling, pus-like drainage, or a temperature over 101°. If any of these symptoms occur, please call your doctor at [phone]. If after office hours, leave a message and the on-call provider will return your call.    --  Ice may be applied to your injection site. It is recommended you avoid direct heat (heating pad; hot tub) for 1-2 days.    --  Call [facility] at [phone] if you experience persistent headache, persistent bleeding from the injection site, or severe pain not relieved by heat or oral medication.    --  Do not make important decisions today.    --  Due to the effects of the block and/or the I.V. Sedation, DO NOT drive or operate hazardous machinery for 12 hours.  Local anesthetics may cause numbness after procedure and precautions must be taken with regards to operating equipment as well as with walking, even if ambulating with assistance of another person or with an assistive device.    --  Do not drink alcohol for 12 hours.    -- You may return to work tomorrow, or as directed by your referring doctor.    --  Occasionally you may notice a slight increase in your pain after the procedure. This should start to improve within the next 24-48 hours. Radiofrequency ablation procedure pain may last 3-4 weeks.    --  It may take as long as 3-4 days before you notice a gradual improvement in your pain and/or other symptoms.    -- You may continue to take your prescribed pain medication as needed.    --  Some normal possible side effects of steroid use could include fluid retention, increased blood sugar, dull headache,  increased sweating, increased appetite, mood swings and flushing.    --  Diabetics are recommended to watch their blood glucose level closely for 24-48 hours after the injection.    --  Must stay in PACU for 20 min upon arrival and prove no leg weakness before being discharged.    --  IN THE EVENT OF A LIFE THREATENING EMERGENCY, (CHEST PAIN, BREATHING DIFFICULTIES, PARALYSIS…) YOU SHOULD GO TO YOUR NEAREST EMERGENCY ROOM.    --  You should be contacted by our office within 2-3 days to schedule follow up or next appointment date.  If not contacted within 7 days, please call the office at [phone]

## 2021-12-08 ENCOUNTER — HOSPITAL ENCOUNTER (OUTPATIENT)
Facility: SURGERY CENTER | Age: 76
Setting detail: HOSPITAL OUTPATIENT SURGERY
Discharge: HOME | End: 2021-12-08
Attending: ANESTHESIOLOGY | Admitting: ANESTHESIOLOGY

## 2021-12-08 ENCOUNTER — HOSPITAL ENCOUNTER (OUTPATIENT)
Dept: GENERAL RADIOLOGY | Facility: SURGERY CENTER | Age: 76
Setting detail: HOSPITAL OUTPATIENT SURGERY
End: 2021-12-08

## 2021-12-08 VITALS
BODY MASS INDEX: 37.25 KG/M2 | OXYGEN SATURATION: 98 % | WEIGHT: 275 LBS | TEMPERATURE: 98.1 F | HEART RATE: 69 BPM | SYSTOLIC BLOOD PRESSURE: 139 MMHG | HEIGHT: 72 IN | DIASTOLIC BLOOD PRESSURE: 77 MMHG | RESPIRATION RATE: 16 BRPM

## 2021-12-08 DIAGNOSIS — M46.1 SACROILIITIS, NOT ELSEWHERE CLASSIFIED (HCC): ICD-10-CM

## 2021-12-08 PROCEDURE — 77002 NEEDLE LOCALIZATION BY XRAY: CPT

## 2021-12-08 PROCEDURE — G0260 INJ FOR SACROILIAC JT ANESTH: HCPCS | Performed by: ANESTHESIOLOGY

## 2021-12-08 PROCEDURE — 0 IOHEXOL 300 MG/ML SOLUTION 10 ML VIAL: Performed by: ANESTHESIOLOGY

## 2021-12-08 PROCEDURE — 30243S0 TRANSFUSION OF AUTOLOGOUS GLOBULIN INTO CENTRAL VEIN, PERCUTANEOUS APPROACH: ICD-10-PCS | Performed by: ANESTHESIOLOGY

## 2021-12-08 PROCEDURE — 76000 FLUOROSCOPY <1 HR PHYS/QHP: CPT

## 2021-12-08 PROCEDURE — 25010000002 DEXAMETHASONE SODIUM PHOSPHATE 100 MG/10ML SOLUTION 10 ML VIAL: Performed by: ANESTHESIOLOGY

## 2021-12-08 PROCEDURE — 27096 INJECT SACROILIAC JOINT: CPT | Performed by: ANESTHESIOLOGY

## 2021-12-08 RX ORDER — SODIUM CHLORIDE 0.9 % (FLUSH) 0.9 %
10 SYRINGE (ML) INJECTION AS NEEDED
Status: DISCONTINUED | OUTPATIENT
Start: 2021-12-08 | End: 2021-12-08 | Stop reason: HOSPADM

## 2021-12-08 RX ORDER — SODIUM CHLORIDE 0.9 % (FLUSH) 0.9 %
10 SYRINGE (ML) INJECTION EVERY 12 HOURS SCHEDULED
Status: DISCONTINUED | OUTPATIENT
Start: 2021-12-08 | End: 2021-12-08 | Stop reason: HOSPADM

## 2021-12-08 NOTE — OP NOTE
Left Sacroiliac Joint Injection  [facility]      PREOPERATIVE DIAGNOSIS:   Sacroiliac joint dysfunction    POSTOPERATIVE DIAGNOSIS:  Sacroiliac joint dysfunction    PROCEDURE:  Sacroiliac Joint Injection, with fluoroscopic guidance [facility] [id number] -LT    PRE-PROCEDURE DISCUSSION WITH PATIENT:    Risks and complications were discussed with the patient prior to starting the procedure and informed consent was obtained.  We discussed various topics including but not limited to bleeding, infection, injury, postprocedural site soreness, painful flareup, worsening of clinical picture, paralysis, coma, and death.     SURGEON:  Irasema Loco MD    REASON FOR PROCEDURE:    Patient has pain consistent with SI pathology on history and physical exam.    SEDATION:  Patient declined administration of moderate sedation    ANESTHETIC AGENT:  Lidocaine 1%  STEROID AGENT:  15mg Dexamethasone    DESCRIPTON OF PROCEDURE:  After obtaining informed consent, IV access was not obtained in the preoperative area.  The patient was transported to the operative suite and placed in the prone position with a pillow under the pelvic area. EKG, blood pressure, and pulse oximeter were monitored. The lumbosacral area was prepped with Chloraprep and draped in a sterile fashion. Under fluoroscopic guidance the inferior most portion of the Left SI joint was identified. The overlying skin and subcutaneous tissue was anesthetized with 1% lidocaine. A 22-gauge spinal needle was then advanced under fluoroscopic guidance in a coaxial view into the joint space.  After negative aspiration, 1 mL of Omnipaque was injected, and after seeing appropriate spread into the joint a volume of 3ml of the above medication was injected.    Needle was removed intact.      Vital signs remained stable.  The onset of analgesia was noted.      ESTIMATED BLOOD LOSS:  minimal  SPECIMENS:  None  COMPLICATIONS:  No complications were noted.    TOLERANCE & DISCHARGE  CONDITION:    The patient tolerated the procedure well.  The patient was transported to the recovery area without difficulties.  The patient was discharged to home under the care of family in stable and satisfactory condition.    PLAN OF CARE:  1. The patient was given our standard instruction sheet and will resume all medications as per the medication reconciliation sheet.  2. The patient will Return to clinic 4-6 wks.  3. The patient is instructed to keep an account of pain relief after the procedure.

## 2021-12-10 ENCOUNTER — TELEPHONE (OUTPATIENT)
Dept: PAIN MEDICINE | Facility: CLINIC | Age: 76
End: 2021-12-10

## 2021-12-10 NOTE — TELEPHONE ENCOUNTER
Talk to patient going to [place] and won't be back until April. Patient will call to schedule follow up when back in town

## 2021-12-20 ENCOUNTER — TELEPHONE (OUTPATIENT)
Dept: FAMILY MEDICINE CLINIC | Facility: CLINIC | Age: 76
End: 2021-12-20

## 2021-12-20 ENCOUNTER — HOSPITAL ENCOUNTER (OUTPATIENT)
Dept: GENERAL RADIOLOGY | Facility: HOSPITAL | Age: 76
Discharge: HOME | End: 2021-12-20
Admitting: NURSE PRACTITIONER

## 2021-12-20 DIAGNOSIS — Z01.818 PREOPERATIVE TESTING: ICD-10-CM

## 2021-12-20 PROCEDURE — 71046 X-RAY EXAM CHEST 2 VIEWS: CPT

## 2021-12-20 NOTE — TELEPHONE ENCOUNTER
PATIENT CALLED TO CHECK AND SEE IF THE LAB WORK THAT WAS DONE ON 12/16/21 WAS FAXED TO:    [place], [place]  FAX: [phone]    PLEASE ADVISE  [phone]

## 2021-12-29 ENCOUNTER — OFFICE VISIT (OUTPATIENT)
Dept: FAMILY MEDICINE CLINIC | Facility: CLINIC | Age: 76
End: 2021-12-29

## 2021-12-29 VITALS
BODY MASS INDEX: 37.38 KG/M2 | OXYGEN SATURATION: 99 % | WEIGHT: 276 LBS | TEMPERATURE: 97.7 F | SYSTOLIC BLOOD PRESSURE: 126 MMHG | HEART RATE: 90 BPM | HEIGHT: 72 IN | DIASTOLIC BLOOD PRESSURE: 82 MMHG

## 2021-12-29 DIAGNOSIS — E78.5 HYPERLIPIDEMIA, UNSPECIFIED HYPERLIPIDEMIA TYPE: ICD-10-CM

## 2021-12-29 DIAGNOSIS — G60.9 IDIOPATHIC PERIPHERAL NEUROPATHY: ICD-10-CM

## 2021-12-29 DIAGNOSIS — R60.9 EDEMA, UNSPECIFIED TYPE: ICD-10-CM

## 2021-12-29 DIAGNOSIS — I10 ESSENTIAL HYPERTENSION: ICD-10-CM

## 2021-12-29 DIAGNOSIS — Z00.00 MEDICARE ANNUAL WELLNESS VISIT, SUBSEQUENT: Primary | ICD-10-CM

## 2021-12-29 DIAGNOSIS — I10 PRIMARY HYPERTENSION: ICD-10-CM

## 2021-12-29 PROCEDURE — G0439 PPPS, SUBSEQ VISIT: HCPCS | Performed by: INTERNAL MEDICINE

## 2021-12-29 PROCEDURE — 1159F MED LIST DOCD IN RCRD: CPT | Performed by: INTERNAL MEDICINE

## 2021-12-29 PROCEDURE — 1170F FXNL STATUS ASSESSED: CPT | Performed by: INTERNAL MEDICINE

## 2021-12-29 RX ORDER — EVOLOCUMAB 140 MG/ML
140 INJECTION, SOLUTION SUBCUTANEOUS
Qty: 2 ML | Refills: 5 | Status: SHIPPED | OUTPATIENT
Start: 2021-12-29 | End: 2022-06-13

## 2021-12-29 RX ORDER — FUROSEMIDE 20 MG/1
20 TABLET ORAL EVERY MORNING
Qty: 90 TABLET | Refills: 1 | Status: SHIPPED | OUTPATIENT
Start: 2021-12-29 | End: 2022-11-17

## 2021-12-29 RX ORDER — PREGABALIN 150 MG/1
150 CAPSULE ORAL 2 TIMES DAILY
Qty: 180 CAPSULE | Refills: 1 | Status: SHIPPED | OUTPATIENT
Start: 2021-12-29 | End: 2022-07-05 | Stop reason: SDUPTHER

## 2021-12-29 RX ORDER — LISINOPRIL 10 MG/1
10 TABLET ORAL EVERY MORNING
Qty: 90 TABLET | Refills: 1 | Status: SHIPPED | OUTPATIENT
Start: 2021-12-29 | End: 2022-11-17

## 2021-12-29 NOTE — PROGRESS NOTES
Subsequent Medicare Wellness Visit   The ABC's of the Annual Wellness Visit    Chief Complaint   Patient presents with   • Annual Exam       HPI:  Isaac Suarez, -1945, is a 76 y.o. male who presents for a Subsequent Medicare Wellness Visit.    Follow-up for hypertension.  Currently, has been feeling well and asymptomatic without any headaches, vision changes, cough, chest pain, shortness of breath, swelling, focal neurologic deficit, memory loss or syncope.  Has been taking the medications regularly and adherent with the regimen of furosemide 20 mg in the morning and lisinopril 10 mg daily.  Denies medication side effects and no significant interval events.       Follow-up for cholesterol.  Currently, has been feeling well without any myalgias, muscle aches, weakness, numbness, chest pain, short of breath or other issues.  Currently, is not on any regimen secondary to intolerance to zetia and statins and insurance denied the Nexletol 180 mg daily and Repatha every 14 days saying will only cover if cardiology prescribed.  Wants to try to get the Repatha again.    History of low back pain that radiates down the right leg with weakness in the right leg in the past and intermittent numbness.  Had been seen by Dr. Frias 6 years ago and has had EMG with Dr. Gomez showing abnormal study slowing severe peripheral neuropathy with significant axonal loss.  MRI on 10/9/2017 showed degenerative disc disease L4-L5 fusion.  Having difficulty walking secondary to pain and can walk only approximately 50 yards.  Was seen by pain management at [facility] pain management in distant past and underwent radiofrequency ablation which did help some.  Has not been seen for 6 years.  MRI was ordered that showed protruding disc at the L3-L4 level with a moderate central canal stenosis and some arthritis with swelling on both sides of the disc causing narrowing of the space that the nerve exits the spine on both sides.  Discussed  this after he received his MRI and he tried a course of oral steroids.  The steroids did help calm it down with the pain at night but still with pain with walking.  Is already currently on Lyrica 150 mg twice a day.  Now in with Dr Loco of pain management and helping and he is happy.    Patient with left knee OA severe and planned replacement in [place] 1/7/21.    Recent Hospitalizations:  No hospitalization(s) within the last year..    Current Medical Providers:  Patient Care Team:  Guillermo Moran MD as PCP - General (Internal Medicine)  Edward Starkey MD as Surgeon (Orthopedic Surgery)  Sue Fraga DPM as Consulting Physician (Podiatry)  Adam Cunningham MD as Consulting Physician (Urology)  Irasema Loco MD as Consulting Physician (Pain Medicine)    Health Habits and Functional and Cognitive Screening and Depression Screening:  Functional & Cognitive Status 12/29/2021   Do you have difficulty preparing food and eating? No   Do you have difficulty bathing yourself, getting dressed or grooming yourself? No   Do you have difficulty using the toilet? No   Do you have difficulty moving around from place to place? No   Do you have trouble with steps or getting out of a bed or a chair? No   Current Diet Well Balanced Diet   Dental Exam Up to date   Eye Exam Up to date   Exercise (times per week) 0 times per week   Do you need help using the phone?  No   Are you deaf or do you have serious difficulty hearing?  No   Do you need help with transportation? No   Do you need help shopping? No   Do you need help preparing meals?  No   Do you need help with housework?  No   Do you need help with laundry? No   Do you need help taking your medications? No   Do you need help managing money? No   Do you ever drive or ride in a car without wearing a seat belt? No   Have you felt unusual stress, anger or loneliness in the last month? No   Who do you live with? Spouse   If you need help, do you have trouble  finding someone available to you? No   Have you been bothered in the last four weeks by sexual problems? No   Do you have difficulty concentrating, remembering or making decisions? No       Compared to one year ago, the patient feels his physical health is the same and his mental health is the same.    Depression Screen:  PHQ-2/PHQ-9 Depression Screening 12/29/2021   Little interest or pleasure in doing things 0   Feeling down, depressed, or hopeless 0   Trouble falling or staying asleep, or sleeping too much 0   Feeling tired or having little energy 0   Poor appetite or overeating 0   Feeling bad about yourself - or that you are a failure or have let yourself or your family down 0   Trouble concentrating on things, such as reading the newspaper or watching television 0   Moving or speaking so slowly that other people could have noticed. Or the opposite - being so fidgety or restless that you have been moving around a lot more than usual 0   Thoughts that you would be better off dead, or of hurting yourself in some way 0   Total Score 0       Falls Risk Assessment:  ROBERT Fall Risk Clinician Key Questions   Have you fallen in the past year?: No  Do you feel unsteady with walking?: Yes  Are you worried about falling?: Yes  Issue is related to the knee but is already scheduled for surgery.    Past Medical/Family/Social History:  The following portions of the patient's history were reviewed and updated as appropriate: allergies, current medications, past family history, past medical history, past social history, past surgical history and problem list.    Allergies   Allergen Reactions   • Crestor [Rosuvastatin] Myalgia   • Gabapentin Other (See Comments)     nightmares   • Penicillins Hives     HIVES   • Zetia [Ezetimibe] Myalgia   • Zocor [Simvastatin] Myalgia         Current Outpatient Medications:   •  aspirin 81 MG EC tablet, Take 81 mg by mouth every morning. STOPPED 9/12/16, Disp: , Rfl:   •  furosemide (LASIX)  20 MG tablet, Take 1 tablet by mouth Every Morning., Disp: 90 tablet, Rfl: 1  •  Ibuprofen-Acetaminophen (ADVIL DUAL ACTION PO), Take  by mouth., Disp: , Rfl:   •  levocetirizine (XYZAL) 5 MG tablet, Take 5 mg by mouth Every Evening., Disp: , Rfl:   •  lisinopril (PRINIVIL,ZESTRIL) 10 MG tablet, Take 1 tablet by mouth Every Morning., Disp: 90 tablet, Rfl: 1  •  Lumigan 0.01 % ophthalmic drops, , Disp: , Rfl:   •  pregabalin (LYRICA) 150 MG capsule, Take 1 capsule by mouth 2 (Two) Times a Day., Disp: 180 capsule, Rfl: 1  •  Evolocumab (Repatha) solution prefilled syringe injection, Inject 1 mL under the skin into the appropriate area as directed Every 14 (Fourteen) Days., Disp: 2 mL, Rfl: 5    Aspirin use counseling: Does not need ASA but is currently taking (advised patient that ASA is not indicated and patient chooses to stop it)    Current medication list contains no high risk medications.  No harmful drug interactions have been identified.     Family History   Problem Relation Age of Onset   • Cancer Father         BRAIN   • Esophageal cancer Other        Social History     Tobacco Use   • Smoking status: Former Smoker     Packs/day: 2.00     Years: 12.00     Pack years: 24.00     Types: Cigarettes     Quit date:      Years since quittin.0   • Smokeless tobacco: Never Used   Substance Use Topics   • Alcohol use: Yes     Alcohol/week: 2.0 standard drinks     Types: 2 Standard drinks or equivalent per week     Comment: SOCIALLY-Drinks 7 or less drinks per week       Past Surgical History:   Procedure Laterality Date   • CATARACT EXTRACTION Right    • COLONOSCOPY N/A 8/15/2016    Procedure: COLONOSCOPY INTO CECUM WITH COLD SNARE POLYPECTOMY;  Surgeon: Loc Lee MD;  Location: [facility] ENDOSCOPY;  Service:    • HERNIA REPAIR      bilat inguinal   • INSERTION PROSTATE RADIATION SEED     • JOINT REPLACEMENT     • LUMBAR DISCECTOMY FUSION INSTRUMENTATION N/A 2016    Procedure: L 4-5 LUMBAR DISCECTOMY  "FUSION WITH INSTRUMENTATION;  Surgeon: Michael Frias MD;  Location: [facility] MAIN OR;  Service:    • LUMBAR EPIDURAL INJECTION N/A 8/6/2021    Procedure: LUMBAR EPIDURAL 1ST VISIT 3-4;  Surgeon: Irasema Loco MD;  Location: [facility] MAIN OR;  Service: Pain Management;  Laterality: N/A;   • LUMBAR EPIDURAL INJECTION N/A 9/10/2021    Procedure: lumbar epidural steroid injection lumbar2-3;  Surgeon: Irasema Loco MD;  Location: [facility] MAIN OR;  Service: Pain Management;  Laterality: N/A;   • PROSTATE BIOPSY      Needle biopsy   • ROTATOR CUFF REPAIR Bilateral     metal in both   • SACROILIAC JOINT INJECTION Left 12/8/2021    Procedure: SACROILIAC INJECTION-- left;  Surgeon: Irasema Loco MD;  Location: [facility] MAIN OR;  Service: Pain Management;  Laterality: Left;   • TOTAL SHOULDER ARTHROPLASTY Bilateral    • VASECTOMY         Patient Active Problem List   Diagnosis   • Spinal stenosis of lumbar region with neurogenic claudication   • Chronic midline low back pain without sciatica   • Bilateral sacroiliitis (HCC)   • Hypertension   • Hyperlipidemia   • Leg cramps   • Elevated transaminase level   • Medicare annual wellness visit, subsequent   • Encounter for hepatitis C screening test for low risk patient   • Vitamin D deficiency   • Muscle spasm   • RLS (restless legs syndrome)   • S/P lumbar spinal fusion   • S/P shoulder replacement   • Idiopathic peripheral neuropathy   • Other chronic pain   • Osteoarthritis of lumbar spine   • Lumbar radiculopathy       Review of Systems    Objective     Vitals:    12/29/21 0906   BP: 126/82   BP Location: Left arm   Patient Position: Sitting   Cuff Size: Adult   Pulse: 90   Temp: 97.7 °F (36.5 °C)   TempSrc: Temporal   SpO2: 99%   Weight: 125 kg (276 lb)   Height: 182.9 cm (72.01\")   PainSc:   7       Patient's Body mass index is 37.42 kg/m². indicating that he is obese (BMI >30). Obesity-related health conditions include the following: hypertension, dyslipidemias and " osteoarthritis. Obesity is unchanged. BMI is is above average; BMI management plan is completed. We discussed portion control and increasing exercise..      No exam data present    The patient has no evidence of cognitve impairment.     Physical Exam    Recent Lab Results:  Lab Results   Component Value Date    GLU 90 12/11/2018     Lab Results   Component Value Date    TRIG 317 (H) 12/16/2021    HDL 37 (L) 12/16/2021    VLDL 62 (H) 12/16/2021       Assessment/Plan   Age-appropriate Screening Schedule:  Refer to the list below for future screening recommendations based on patient's age, sex and/or medical conditions.      Health Maintenance   Topic Date Due   • TDAP/TD VACCINES (1 - Tdap) Never done   • ZOSTER VACCINE (1 of 2) 09/23/1996   • INFLUENZA VACCINE  Completed   • LIPID PANEL  Discontinued       Medicare Risks and Personalized Health Plan:  Fall Risk  Glaucoma Risk  Immunizations Discussed/Encouraged (specific immunizations; Tdap and Shingrix )  Obesity/Overweight       CMS-Preventive Services Quick Reference  Medicare Preventive Services Addressed:  Glaucoma screening (for individuals with diabetes mellitus, family history of glaucoma, -Americans (> or =) age 50, -Americans (> or =) age 65)    Advance Care Planning:  ACP discussion was held with the patient during this visit. Patient has an advance directive in EMR which is still valid.     Diagnoses and all orders for this visit:    1. Medicare annual wellness visit, subsequent (Primary)    2. Primary hypertension  -     furosemide (LASIX) 20 MG tablet; Take 1 tablet by mouth Every Morning.  Dispense: 90 tablet; Refill: 1  -     lisinopril (PRINIVIL,ZESTRIL) 10 MG tablet; Take 1 tablet by mouth Every Morning.  Dispense: 90 tablet; Refill: 1    3. Hyperlipidemia, unspecified hyperlipidemia type  -     Evolocumab (Repatha) solution prefilled syringe injection; Inject 1 mL under the skin into the appropriate area as directed Every 14  (Fourteen) Days.  Dispense: 2 mL; Refill: 5    4. Idiopathic peripheral neuropathy  -     pregabalin (LYRICA) 150 MG capsule; Take 1 capsule by mouth 2 (Two) Times a Day.  Dispense: 180 capsule; Refill: 1    5. Edema, unspecified type  -     furosemide (LASIX) 20 MG tablet; Take 1 tablet by mouth Every Morning.  Dispense: 90 tablet; Refill: 1    6. Essential hypertension  -     furosemide (LASIX) 20 MG tablet; Take 1 tablet by mouth Every Morning.  Dispense: 90 tablet; Refill: 1  -     lisinopril (PRINIVIL,ZESTRIL) 10 MG tablet; Take 1 tablet by mouth Every Morning.  Dispense: 90 tablet; Refill: 1      Annual wellness visit reviewed with patient.  All past history, medications, social history, and problem list were reviewed.  Discussed advanced directives and living will.  Patient has living will: Living will: Directive already scanned in chart.  Discussed fall risk and precautions encourage removing throw rugs and using grab bars within the home and bathroom.  Will check the labs as ordered above to evaluate the blood sugars, kidney, liver, cholesterol for screening.  Discussed flu shot recommended to get the high-dose influenza vaccine annually in the fall.  The patient has a COVID HM Topic on their chart, and they are fully vaccinated.  Prevnar-13 and pneumovax-23 up to date and appropriate.  Tdap and Shingrix vaccination series recommended.  Encourage follow-up with the eye doctor on annual basis for glaucoma evaluation.  Discussed weight and encouraged exercise as tolerated while following a healthy diet.  Colon cancer screening discussed and current status: colonoscopy completed 8/15/16 and repeat after 10 years recommended.  Discussed prostate screening for which he is currently seeing urology.  Follow up with current specialists as needed.    Discussed aspirin use and recommended to stop secondary to no significant history.  To have the knee arthroplasty as scheduled.  Will again try the repatha for the high  cholesterol.    An After Visit Summary and PPPS with all of these plans were given to the patient.      Follow Up:  Return in about 6 months (around 6/29/2022) for Next scheduled follow up.           · COVID-19 Precautions - Patient was compliant in wearing a mask. When I saw the patient, I used appropriate personal protective equipment (PPE) including mask and eye shield (standard procedure).  Additionally, I used gown and gloves if indicated.  Hand hygiene was completed before and after seeing the patient.  · Dictated utilizing Dragon Dictation

## 2021-12-30 ENCOUNTER — TELEPHONE (OUTPATIENT)
Dept: FAMILY MEDICINE CLINIC | Facility: CLINIC | Age: 76
End: 2021-12-30

## 2022-01-01 NOTE — PROGRESS NOTES
Subjective   Isaac Suarez is a 75 y.o. male.     Chief Complaint   Patient presents with   • Sciatica   • Extremity Weakness     right leg       History of Present Illness   Patient complains of pain in the low back that radiates down the right leg and weak in the right leg with intermittent numbness that has been present ever since had back surgery by Dr Frias about 6 years ago.  Patient has undergone EMG testing by Dr. Gomez showing and  abnormal study showing severe peripheral neuropathy with significant axonal loss.  I cannot entirely exclude superimposed radiculopathies primarily right L5 or S1 on either side.  MRI 10/9/17 showed DDD and L4-L5 fusion.  Can only walk 50 yards till pain severe and makes stop. In past had seen pain management with [facility] and only thing that helped was the radiofrequency ablation.    The following portions of the patient's history were reviewed and updated as appropriate: allergies, current medications, past family history, past medical history, past social history, past surgical history and problem list.    Past Medical History:   Diagnosis Date   • Abnormal MRI, lumbar spine    • Allergic rhinitis    • Arthritis    • Bilateral hearing loss    • Borderline blood pressure    • BPH (benign prostatic hyperplasia)    • Cancer (CMS/HCC)     PROSTATE   • Chronic back pain     when walking and lying down   • Chronic kidney disease (CKD), stage I    • Cough    • Edema    • Elevated transaminase level    • Erectile dysfunction    • External hemorrhoids    • GERD (gastroesophageal reflux disease)    • High cholesterol    • History of colon polyps    • History of depression    • History of prostate cancer     RADIATION TX   • Hyperlipidemia    • Hypertension    • Leg cramps    • Low back pain    • Lumbar stenosis    • Muscle spasm    • Prostate cancer (CMS/HCC)    • Renal cyst    • RLS (restless legs syndrome)    • Statin intolerance    • Vitamin D deficiency         Past Surgical History:   Procedure Laterality Date   • CATARACT EXTRACTION Right    • COLONOSCOPY N/A 8/15/2016    Procedure: COLONOSCOPY INTO CECUM WITH COLD SNARE POLYPECTOMY;  Surgeon: Loc Lee MD;  Location: [facility] ENDOSCOPY;  Service:    • HERNIA REPAIR      bilat inguinal   • INSERTION PROSTATE RADIATION SEED     • LUMBAR DISCECTOMY FUSION INSTRUMENTATION N/A 2016    Procedure: L 4-5 LUMBAR DISCECTOMY FUSION WITH INSTRUMENTATION;  Surgeon: Michael Frias MD;  Location: [facility] MAIN OR;  Service:    • PROSTATE BIOPSY      Needle biopsy   • ROTATOR CUFF REPAIR Bilateral     metal in both   • TOTAL SHOULDER ARTHROPLASTY Bilateral    • VASECTOMY         Family History   Problem Relation Age of Onset   • Cancer Father         BRAIN   • Esophageal cancer Other        Social History     Socioeconomic History   • Marital status:      Spouse name: Not on file   • Number of children: Not on file   • Years of education: 12TH GRADE   • Highest education level: Not on file   Tobacco Use   • Smoking status: Former Smoker     Packs/day: 2.00     Years: 12.00     Pack years: 24.00     Types: Cigarettes     Quit date:      Years since quittin.3   • Smokeless tobacco: Never Used   Substance and Sexual Activity   • Alcohol use: Yes     Comment: SOCIALLY-Drinks 7 or less drinks per week   • Drug use: No   • Sexual activity: Defer       Current Outpatient Medications   Medication Sig Dispense Refill   • aspirin 81 MG EC tablet Take 81 mg by mouth every morning. STOPPED 16     • Bempedoic Acid (Nexletol) 180 MG tablet Take 1 tablet by mouth Daily. 30 tablet 3   • Evolocumab (Repatha) solution prefilled syringe injection Inject 1 mL under the skin into the appropriate area as directed Every 14 (Fourteen) Days. 2 mL 5   • furosemide (LASIX) 20 MG tablet TAKE 1 TABLET BY MOUTH EVERY MORNING 90 tablet 1   • lisinopril (PRINIVIL,ZESTRIL) 10 MG tablet TAKE 1 TABLET BY MOUTH EVERY MORNING 90   boy delivered via CS at 23:42 4/3/22.  APGARS 8/9 wt 2500gr SGA, GA 38.2 Mom is  A+ All labs neg, Rubella not imm, GBS neg. SROM 4/3/22 18:24 light mec EOS=0.1 HepB refused DTM, DTV VSS "tablet 1   • pregabalin (LYRICA) 150 MG capsule Take 1 capsule by mouth 2 (Two) Times a Day. 180 capsule 1   • rOPINIRole (REQUIP) 0.25 MG tablet Take 1 tablet by mouth Daily. Take 1 hour before bedtime. 90 tablet 1     No current facility-administered medications for this visit.       Review of Systems   Constitutional: Negative for activity change, appetite change, fatigue, fever, unexpected weight gain and unexpected weight loss.   HENT: Negative for nosebleeds, rhinorrhea, trouble swallowing and voice change.    Eyes: Negative for visual disturbance.   Respiratory: Negative for cough, chest tightness, shortness of breath and wheezing.    Cardiovascular: Negative for chest pain, palpitations and leg swelling.   Gastrointestinal: Negative for abdominal pain, blood in stool, constipation, diarrhea, nausea, vomiting, GERD and indigestion.   Genitourinary: Negative for dysuria, frequency and hematuria.   Musculoskeletal: Positive for back pain and gait problem. Negative for arthralgias and myalgias.        Right sciatica   Skin: Negative for rash and bruise.   Neurological: Positive for weakness and numbness. Negative for dizziness, tremors, light-headedness, headache and memory problem.   Hematological: Negative for adenopathy. Does not bruise/bleed easily.   Psychiatric/Behavioral: Negative for sleep disturbance and depressed mood. The patient is not nervous/anxious.        Objective   /84 (BP Location: Left arm, Patient Position: Sitting, Cuff Size: Adult)   Pulse 67   Temp 97.7 °F (36.5 °C) (Temporal)   Ht 182.9 cm (72.01\")   Wt 123 kg (271 lb)   SpO2 97%   BMI 36.75 kg/m²     Physical Exam  Vitals and nursing note reviewed.   Constitutional:       General: He is not in acute distress.     Appearance: He is well-developed. He is obese. He is not diaphoretic.   HENT:      Head: Normocephalic and atraumatic.      Right Ear: External ear normal.      Left Ear: External ear normal.      Nose: Nose normal. "   Eyes:      Conjunctiva/sclera: Conjunctivae normal.      Pupils: Pupils are equal, round, and reactive to light.   Neck:      Thyroid: No thyromegaly.      Trachea: No tracheal deviation.   Cardiovascular:      Rate and Rhythm: Normal rate and regular rhythm.      Heart sounds: Normal heart sounds. No murmur heard.   No friction rub. No gallop.    Pulmonary:      Effort: Pulmonary effort is normal. No respiratory distress.      Breath sounds: Normal breath sounds.   Abdominal:      General: Bowel sounds are normal.      Palpations: Abdomen is soft. There is no mass.      Tenderness: There is no abdominal tenderness. There is no guarding.   Musculoskeletal:         General: Normal range of motion.      Cervical back: Normal range of motion and neck supple.      Comments: Slow to stand and favors the right leg with walking.  Negative straight leg raise.   Lymphadenopathy:      Cervical: No cervical adenopathy.   Skin:     General: Skin is warm and dry.      Capillary Refill: Capillary refill takes less than 2 seconds.      Findings: No rash.   Neurological:      Mental Status: He is alert and oriented to person, place, and time.      Motor: No abnormal muscle tone.      Deep Tendon Reflexes: Reflexes normal.   Psychiatric:         Behavior: Behavior normal.         Thought Content: Thought content normal.         Judgment: Judgment normal.         No results found for this or any previous visit (from the past 2016 hour(s)).  Assessment/Plan   Diagnoses and all orders for this visit:    1. Chronic right-sided low back pain with right-sided sciatica (Primary)  -     Cancel: MRI Lumbar Spine Without Contrast  -     MRI Lumbar Spine Without Contrast    2. Lumbar radiculopathy  -     Cancel: MRI Lumbar Spine Without Contrast  -     MRI Lumbar Spine Without Contrast    Chronic lumbar pain with right-sided sciatica which seems to be worsening in past history of a surgical fusion in the lumbar spine.  We will recheck the  MRI of the lumbar spine at which time we will reassess to determine if we can handle this without surgical intervention or if we need to have pain management involvement.  Patient is agreeable to this plan of action.      I spent 22 minutes caring for Isaac on this date of service. This time includes time spent by me in the following activities:reviewing tests, obtaining and/or reviewing a separately obtained history, performing a medically appropriate examination and/or evaluation , counseling and educating the patient/family/caregiver, ordering medications, tests, or procedures and documenting information in the medical record     · COVID-19 Precautions - Patient was compliant in wearing a mask. When I saw the patient, I used appropriate personal protective equipment (PPE) including mask and eye shield (standard procedure).  Additionally, I used gown and gloves if indicated.  Hand hygiene was completed before and after seeing the patient.  · Dictated utilizing Dragon Dictation

## 2022-01-05 ENCOUNTER — TELEPHONE (OUTPATIENT)
Dept: FAMILY MEDICINE CLINIC | Facility: CLINIC | Age: 77
End: 2022-01-05

## 2022-01-05 NOTE — TELEPHONE ENCOUNTER
Caller: LOCO    Relationship to patient: Other    Best call back number: [phone]    Patient is needing: JAVIER CALLED BACK IN AND SAID ALL THEY RECEIVED WERE LABS. SHE SAID THEY NEED A PRE-OP CLEARANCE FORM FROM THE DOCTOR . PLEASE CALL AND ADVISE

## 2022-01-05 NOTE — TELEPHONE ENCOUNTER
Caller: LOCO (Other)  Best call back number: [phone]  What is the best time to reach you: ANY TIME     Who are you requesting to speak with (clinical staff, provider,  specific staff member): CLINICAL STAFF    What was the call regarding:     LOCO WITH [facility] IN [place] CALLED, INFORMING THE OFFICE THEY DID NOT RECEIVE THE PATIENTS MOST RECENT LAB WORK THEYRE NEEDING.    ZOEY HAS STATED HE REQUESTED FOR US TO FAX HIS BLOOD WORK TO [facility] BECAUSE PATIENT IS HAVING SURGERY ON HIS KNEES THIS Friday. 01/07/22    THEY NEED BLOOD WORK DONE FOR SURGERY Friday EITHER TODAY OR TOMORROW, UNLESS WE CAN GET THIS FAX TO GO THROUGH     [facility] FAX: [phone]    PLEASE ADVISE.

## 2022-04-07 ENCOUNTER — TELEPHONE (OUTPATIENT)
Dept: PAIN MEDICINE | Facility: CLINIC | Age: 77
End: 2022-04-07

## 2022-04-07 NOTE — TELEPHONE ENCOUNTER
Provider: DR. HUNT    Caller: PATIENT     Relationship to Patient: SELF       Phone Number:  [phone]    Reason for Call: PT. IS IN [place] UNTIL 04/17/22- THEN WILL BE COMING BACK HOME.   HE STATES THAT HE GOT AN SI  INJECTION 12/8/21 -WHICH HELPED FOR A WHILE.   HE IS NOW HAVING MORE PAIN IN LOW BACK AND HIP AREA.   REQUESTING A CALL BACK FROM DR. HUNT OR ROSIO.

## 2022-04-07 NOTE — TELEPHONE ENCOUNTER
Called and spoke with pt. He is currently in [place] returning home on 4/17/22. His previous inj has worn off and would like to discuss inj again. I scheduled an ov with you on 4/25/22 to discuss.

## 2022-04-25 ENCOUNTER — OFFICE VISIT (OUTPATIENT)
Dept: PAIN MEDICINE | Facility: CLINIC | Age: 77
End: 2022-04-25

## 2022-04-25 ENCOUNTER — PREP FOR SURGERY (OUTPATIENT)
Dept: SURGERY | Facility: SURGERY CENTER | Age: 77
End: 2022-04-25

## 2022-04-25 VITALS
RESPIRATION RATE: 20 BRPM | SYSTOLIC BLOOD PRESSURE: 129 MMHG | HEIGHT: 72 IN | OXYGEN SATURATION: 99 % | WEIGHT: 285 LBS | DIASTOLIC BLOOD PRESSURE: 77 MMHG | BODY MASS INDEX: 38.6 KG/M2 | HEART RATE: 69 BPM | TEMPERATURE: 96.4 F

## 2022-04-25 DIAGNOSIS — M46.1 SACROILIITIS: ICD-10-CM

## 2022-04-25 DIAGNOSIS — M48.062 SPINAL STENOSIS OF LUMBAR REGION WITH NEUROGENIC CLAUDICATION: ICD-10-CM

## 2022-04-25 DIAGNOSIS — G89.29 OTHER CHRONIC PAIN: Primary | ICD-10-CM

## 2022-04-25 DIAGNOSIS — M46.1 SACROILIITIS, NOT ELSEWHERE CLASSIFIED: Primary | ICD-10-CM

## 2022-04-25 DIAGNOSIS — M47.816 OSTEOARTHRITIS OF LUMBAR SPINE, UNSPECIFIED SPINAL OSTEOARTHRITIS COMPLICATION STATUS: ICD-10-CM

## 2022-04-25 PROCEDURE — 99214 OFFICE O/P EST MOD 30 MIN: CPT | Performed by: NURSE PRACTITIONER

## 2022-04-25 RX ORDER — SODIUM CHLORIDE 0.9 % (FLUSH) 0.9 %
10 SYRINGE (ML) INJECTION AS NEEDED
Status: CANCELLED | OUTPATIENT
Start: 2022-04-25

## 2022-04-25 RX ORDER — SODIUM CHLORIDE 0.9 % (FLUSH) 0.9 %
10 SYRINGE (ML) INJECTION EVERY 12 HOURS SCHEDULED
Status: CANCELLED | OUTPATIENT
Start: 2022-04-25

## 2022-04-25 NOTE — PROGRESS NOTES
CHIEF COMPLAINT  F/u back and left knee pain. Pt had SACROILIAC INJECTION-- left and sts receiving 60% relief x a month then pain returned to baseline. Pt sts had LTK 1/5/22 in [place]. Pt sts left knee pain has improved.     Subjective   Isaac Suarez is a 76 y.o. male  who presents for follow-up.  He has a history of chronic low back pain. Reports this is variable since last evaluation.    Patient presents for follow-up of PROCEDURE. Patient had a left SI joint injection performed by Dr. HUNT on 12-8-21 for management of LOW BACK PAIN. Patient reports 60% relief from the procedure for 1 month.    Complains of pain in his low back, left worse than right. Today his pain is 0/10VAS(sitting).  Reports his pain can be intolerable while being on feet and walking.  Pain increases with walking and standing; pain decreases with rest, procedures and medication.  Continues with Lyrica 150 mg from Dr Moran. ADL's by self. Denies any bowel or bladder changes.    Left knee pain is significantly improved since left TKR in [place] 1-5-22.    Patient remained masked during entire encounter. No cough present. I donned a mask and eye protection throughout entire visit. Prior to donning mask and eye protection, hand hygiene was performed, as well as when it was doffed.  I was closer than 6 feet, but not for an extended period of time. No obvious exposure to any bodily fluids.    Back Pain  This is a chronic problem. The current episode started more than 1 year ago. The problem occurs constantly. The problem has been waxing and waning since onset. The pain is present in the lumbar spine and sacro-iliac. The quality of the pain is described as aching. The pain is at a severity of 0/10 (while sitting). The pain is worse during the day. The symptoms are aggravated by bending, position, standing and twisting. Pertinent negatives include no bladder incontinence, bowel incontinence, chest pain, fever, headaches, numbness or weakness.       Was initially evaluated as a new patient by Dr. Irasema Loco on 7/27/2021.  He was referred here by his PCP Dr. Li off it for evaluation of back pain.  Previously was seen at [facility] and was unhappy with his previous pain experience.  He states that he wants to avoid opioids unless absolutely necessary.  Baseline urine drug screen was obtained.  Plan for L3-L4 epidural steroid injection.  Plan to follow-up 4 weeks after procedure or sooner if needed.     The patient has a pain history of the following:  Chronic low back pain  Lumbar stenosis   History of Lumbar Surgery   Peripheral neuropathy     Previous interventions that the patient has received include:   Left SI 12-8-21-- 60% for 1 month   L2-L3 LESI 9-10-21-- 50% ONGOING  L2-L3 LESI- 8-6-21-- 80-90% relief for 2 weeks, then 30% ongoing, improved ability to walk  Radiofrequency ablation - helped some   Sacroiliac joint injections 1/3/18, 12/6/17  Right trochanteric bursa injection   Lumbar epidural steroid injections      Pain medications include:  Lyrica   Advil      Previously: Medrol dose pack     Other conservative modalities which the patient reports using include:  Physical Therapy: yes - no improvement  Neck or back surgery: yes  Past pain management: yes - [facility]     Past Significant Surgical History:  L4-5 fusion - Dr. Frias   Right sacroiliac joint fusion     PEG Assessment   What number best describes your pain on average in the past week?6  What number best describes how, during the past week, pain has interfered with your enjoyment of life?6  What number best describes how, during the past week, pain has interfered with your general activity?  6    The following portions of the patient's history were reviewed and updated as appropriate: allergies, current medications, past family history, past medical history, past social history, past surgical history and problem list.    Review of Systems   Constitutional: Negative for  "activity change, fatigue and fever.   HENT: Negative for congestion.    Eyes: Negative for visual disturbance.   Respiratory: Negative for cough and shortness of breath.    Cardiovascular: Negative for chest pain.   Gastrointestinal: Negative for bowel incontinence, constipation and diarrhea.   Genitourinary: Negative for bladder incontinence and difficulty urinating.   Musculoskeletal: Positive for arthralgias (left knee) and back pain. Negative for joint swelling.   Neurological: Negative for dizziness, weakness, light-headedness, numbness and headaches.   Psychiatric/Behavioral: Negative for agitation, sleep disturbance and suicidal ideas. The patient is not nervous/anxious.      I have reviewed and confirmed the accuracy of the ROS as documented by the MA/LPN/RN ROSIO Mcfarland    Vitals:    04/25/22 0834   BP: 129/77   Pulse: 69   Resp: 20   Temp: 96.4 °F (35.8 °C)   SpO2: 99%   Weight: 129 kg (285 lb)   Height: 182.9 cm (72.01\")   PainSc: 0-No pain   PainLoc: Back       Objective   Physical Exam  Vitals and nursing note reviewed.   Constitutional:       Appearance: He is well-developed.   HENT:      Head: Normocephalic and atraumatic.   Musculoskeletal:      Lumbar back: Tenderness and bony tenderness present. Decreased range of motion. Negative right straight leg raise test and negative left straight leg raise test.      Left knee: Swelling and bony tenderness present. Decreased range of motion.      Comments: Surgical scar of lumbar spine    MODERATE tenderness of left SI joint, negative on right  Negative lumbar facet tenderness    Unable to perform NOHEMI due to patient's knee pain. +ANNE-MARIE on left, negative on right   Neurological:      Mental Status: He is alert.      Gait: Gait abnormal (walking with cane--- due to knee pain).      Deep Tendon Reflexes:      Reflex Scores:       Patellar reflexes are 0 on the right side and 0 on the left side.  Psychiatric:         Speech: Speech normal.         " Behavior: Behavior normal.         Thought Content: Thought content normal.         Judgment: Judgment normal.         Assessment/Plan   Diagnoses and all orders for this visit:    1. Other chronic pain (Primary)    2. Spinal stenosis of lumbar region with neurogenic claudication    3. Osteoarthritis of lumbar spine, unspecified spinal osteoarthritis complication status    4. Sacroiliitis (HCC)      --- Repeat Left Si joint injection. No blood thinners. Reviewed the procedure at length with the patient.  Included in the review was expectations, complications, risk and benefits.The procedure was described in detail and the risks, benefits and alternatives were discussed with the patient (including but not limited to: bleeding, infection, nerve damage, worsening of pain, inability to perform injection, paralysis, seizures, coma, no pain relief and death) who agreed to proceed.  Discussed the potential for sedation if warranted/wanted.  The procedure will plan to be performed at [facility] with fluoroscopic guidance(unless ultrasound is indicated) and could potentially have steroids and contrast dye used. Questions were answered and in a way the patient could understand.  Patient verbalized understanding and wishes to proceed.  This intervention will be ordered.  Discussed with patient that all procedures are part of a multimodal plan of care and include either formal PT or a home exercise program.  Patient has no evidence of coagulopathy or current infection.  --- consider RFA.  --- Follow-up after procedure or sooner if needed.    ----------  Education about Sacroiliac joint injections:  This Sacroiliac joint injection (blockade) we have suggested is intended for diagnostic purposes, with the intent of offering the patient Radiofrequency thermal rhizotomy (RF) of the sensory branches to the joint if the block is diagnostically effective.  The diagnostic blockade is necessary to determine the  likelihood that RF therapy could be efficacious in providing long term relief to the patient.    In this procedure, the sacroiliac joint is aligned with imaging, and under image guidance a needle is placed with the needle tip into the joint.  The needle position is confirmed to be appropriately in the joint before injection of medication into the joint.  When xray fluoroscopy is used, contrast dye is used to confirm a proper arthrogram (i.e., outline of the joint).  When ultrasound is used, IV fluid (normal saline) is injected to see the flow of the fluid into the joint.  Once confirmed, then the medication can be injected into the joint.  Oftentimes this medication is a combination of local anesthetics (for diagnostic purposes) and also a steroid (to decrease irritation & inflammation in the joint, also known as sacroilitis).      Medically, a successful RF procedure should provide a patient with 50% pain relief or more for at least 6 months.  Clinical experience suggests that successful patients receive relief more in the range of 12 months on average.  We also discussed that many patients receive therapeutic success from the intraarticular joint injection, and may not require RF ablation.  If a patient receives more than 8 weeks of relief from joint injection, then occasional repeat joint blocks for therapeutic purposes is a very reasonable alternative therapy.  This course of therapy is consistent with our LCDs according to our CMS  in the area, and therefore other insurance providers should follow accordingly.  We will monitor our patients to screen for these therapeutic responders and will offer RF therapy only when necessary.      We discussed that joint injections & also RF procedures are not without risks.  Best practices regarding anticoagulant use & neuraxial procedures will be respected.  Oftentimes a patient on an anticoagulant can be offered a joint injection safely, but again this is not  risk-free, and such patients give consent with regards to this increased bleeding risk, which could cause problems including but not limited to worsening of pain, nerve damage, or muscle damage.  Patients that are ill or otherwise may be at risk for sepsis will not have their spines accessed by neuraxial injections of any type.  This patient will not be offered these therapies if there is an increased risk.   We discussed that there is a risk of postprocedural pain and also a risk of worsening of clinical picture with these procedures as with any neuraxial procedure.    ----------         NEHA REPORT  As the clinician, I personally reviewed the NEHA from 4-25-22 while the patient was in the office today.       Dictated utilizing Dragon dictation.     This document is intended for medical expert use only. Reading of this document by patients and/or patient's family without participating medical staff guidance may result in misinterpretation and unintended morbidity.   Any interpretation of such data is the responsibility of the patient and/or family member responsible for the patient in concert with their primary or specialist providers, not to be left for sources of online searches such as [employer], [employer] or similar queries. Relying on these approaches to knowledge may result in misinterpretation, misguided goals of care and even death should patients or family members try recommendations outside of the realm of professional medical care in a supervised way.

## 2022-04-27 ENCOUNTER — TRANSCRIBE ORDERS (OUTPATIENT)
Dept: SURGERY | Facility: SURGERY CENTER | Age: 77
End: 2022-04-27

## 2022-04-27 DIAGNOSIS — M46.1 SACROILIITIS, NOT ELSEWHERE CLASSIFIED: Primary | ICD-10-CM

## 2022-05-04 NOTE — SIGNIFICANT NOTE
Patient educated on the following :    - If you are receiving Sedation for your procedure Nothing to Eat 6 hours and only clear liquids for 2 hours prior to your procedure.    -Your required COVID Test is Scheduled on Between the Hours of 3001-7193  -You will only be notified if your COVID test Result is POSITIVE  -The importance of reducing your number of contacts by self quarantining after you COVID test until the date of your PROCEDURE  -You will need to have someone drive you home after your PROCEDURE and remain with you for 24 hours after the PROCEDURE  - The date of your procedure, your are welcome to have one visitor at bedside or remain within 10-15 minutes of [facility]  -You will need to arrive at 1430 on 5/9/22 PROCEDURE  -Please contact [facility] at: [phone] with any questions and/or concerns

## 2022-05-05 NOTE — DISCHARGE INSTRUCTIONS
[facility] - Post-procedure Instructions          --  While there are no absolute restrictions, it is recommended that you do not perform strenuous activity today. In the morning, you may resume your level of activity as before your block.    --  If you have a band-aid at your injection site, please remove it later today. Observe the area for any redness, swelling, pus-like drainage, or a temperature over 101°. If any of these symptoms occur, please call your doctor at [phone]. If after office hours, leave a message and the on-call provider will return your call.    --  Ice may be applied to your injection site. It is recommended you avoid direct heat (heating pad; hot tub) for 1-2 days.    --  Call [facility] at [phone] if you experience persistent headache, persistent bleeding from the injection site, or severe pain not relieved by heat or oral medication.    --  Do not make important decisions today.    --  Due to the effects of the block and/or the I.V. Sedation, DO NOT drive or operate hazardous machinery for 12 hours.  Local anesthetics may cause numbness after procedure and precautions must be taken with regards to operating equipment as well as with walking, even if ambulating with assistance of another person or with an assistive device.    --  Do not drink alcohol for 12 hours.    -- You may return to work tomorrow, or as directed by your referring doctor.    --  Occasionally you may notice a slight increase in your pain after the procedure. This should start to improve within the next 24-48 hours. Radiofrequency ablation procedure pain may last 3-4 weeks.    --  It may take as long as 3-4 days before you notice a gradual improvement in your pain and/or other symptoms.    -- You may continue to take your prescribed pain medication as needed.    --  Some normal possible side effects of steroid use could include fluid retention, increased blood sugar, dull headache,  increased sweating, increased appetite, mood swings and flushing.    --  Diabetics are recommended to watch their blood glucose level closely for 24-48 hours after the injection.    --  Must stay in PACU for 20 min upon arrival and prove no leg weakness before being discharged.    --  IN THE EVENT OF A LIFE THREATENING EMERGENCY, (CHEST PAIN, BREATHING DIFFICULTIES, PARALYSIS…) YOU SHOULD GO TO YOUR NEAREST EMERGENCY ROOM.    --  You should be contacted by our office within 2-3 days to schedule follow up or next appointment date.  If not contacted within 7 days, please call the office at [phone]

## 2022-05-09 ENCOUNTER — HOSPITAL ENCOUNTER (OUTPATIENT)
Facility: SURGERY CENTER | Age: 77
Setting detail: HOSPITAL OUTPATIENT SURGERY
Discharge: HOME | End: 2022-05-09
Attending: ANESTHESIOLOGY | Admitting: ANESTHESIOLOGY

## 2022-05-09 ENCOUNTER — HOSPITAL ENCOUNTER (OUTPATIENT)
Dept: GENERAL RADIOLOGY | Facility: SURGERY CENTER | Age: 77
Setting detail: HOSPITAL OUTPATIENT SURGERY
End: 2022-05-09

## 2022-05-09 VITALS
HEIGHT: 72 IN | BODY MASS INDEX: 38.6 KG/M2 | RESPIRATION RATE: 16 BRPM | HEART RATE: 79 BPM | DIASTOLIC BLOOD PRESSURE: 85 MMHG | SYSTOLIC BLOOD PRESSURE: 126 MMHG | WEIGHT: 285 LBS | TEMPERATURE: 97.9 F | OXYGEN SATURATION: 98 %

## 2022-05-09 DIAGNOSIS — M46.1 SACROILIITIS, NOT ELSEWHERE CLASSIFIED: ICD-10-CM

## 2022-05-09 PROCEDURE — 77002 NEEDLE LOCALIZATION BY XRAY: CPT

## 2022-05-09 PROCEDURE — 76000 FLUOROSCOPY <1 HR PHYS/QHP: CPT

## 2022-05-09 PROCEDURE — 25010000002 DEXAMETHASONE SODIUM PHOSPHATE 100 MG/10ML SOLUTION 10 ML VIAL: Performed by: ANESTHESIOLOGY

## 2022-05-09 PROCEDURE — 27096 INJECT SACROILIAC JOINT: CPT | Performed by: ANESTHESIOLOGY

## 2022-05-09 PROCEDURE — G0260 INJ FOR SACROILIAC JT ANESTH: HCPCS | Performed by: ANESTHESIOLOGY

## 2022-05-09 RX ORDER — SODIUM CHLORIDE 0.9 % (FLUSH) 0.9 %
10 SYRINGE (ML) INJECTION AS NEEDED
Status: DISCONTINUED | OUTPATIENT
Start: 2022-05-09 | End: 2022-05-09 | Stop reason: HOSPADM

## 2022-05-09 RX ORDER — SODIUM CHLORIDE 0.9 % (FLUSH) 0.9 %
10 SYRINGE (ML) INJECTION EVERY 12 HOURS SCHEDULED
Status: DISCONTINUED | OUTPATIENT
Start: 2022-05-09 | End: 2022-05-09 | Stop reason: HOSPADM

## 2022-05-09 NOTE — OP NOTE
Left Sacroiliac Joint Injection  [facility]      PREOPERATIVE DIAGNOSIS:   Sacroiliac joint dysfunction    POSTOPERATIVE DIAGNOSIS:  Sacroiliac joint dysfunction    PROCEDURE:  Sacroiliac Joint Injection, with fluoroscopic guidance [facility] [id number] -LT    PRE-PROCEDURE DISCUSSION WITH PATIENT:    Risks and complications were discussed with the patient prior to starting the procedure and informed consent was obtained.  We discussed various topics including but not limited to bleeding, infection, injury, postprocedural site soreness, painful flareup, worsening of clinical picture, paralysis, coma, and death.     SURGEON:  Irasema Loco MD    REASON FOR PROCEDURE:    Patient has pain consistent with SI pathology on history and physical exam. Previous clinically significant therapeutic effect of SI joint injection.      SEDATION:  Patient declined administration of moderate sedation    ANESTHETIC AGENT:  Lidocaine 1%  STEROID AGENT:  15mg Dexamethasone    DESCRIPTON OF PROCEDURE:  After obtaining informed consent, IV access was not obtained in the preoperative area.  The patient was transported to the operative suite and placed in the prone position with a pillow under the pelvic area. EKG, blood pressure, and pulse oximeter were monitored. The lumbosacral area was prepped with Chloraprep and draped in a sterile fashion. Under fluoroscopic guidance the inferior most portion of the Left SI joint was identified. The overlying skin and subcutaneous tissue was anesthetized with 1% lidocaine. A 22-gauge spinal needle was then advanced under fluoroscopic guidance in a coaxial view into the joint space.  After negative aspiration, a volume of 3ml of the above medication was injected.    Needle was removed intact.      Vital signs remained stable.  The onset of analgesia was noted.      ESTIMATED BLOOD LOSS:  minimal  SPECIMENS:  None  COMPLICATIONS:  No complications were noted.    TOLERANCE & DISCHARGE  CONDITION:    The patient tolerated the procedure well.  The patient was transported to the recovery area without difficulties.  The patient was discharged to home under the care of family in stable and satisfactory condition.    PLAN OF CARE:  1. The patient was given our standard instruction sheet and will resume all medications as per the medication reconciliation sheet.  2. The patient will Return to clinic 4-6 wks.  3. The patient is instructed to keep an account of pain relief after the procedure.

## 2022-05-31 ENCOUNTER — TELEPHONE (OUTPATIENT)
Dept: FAMILY MEDICINE CLINIC | Facility: CLINIC | Age: 77
End: 2022-05-31

## 2022-05-31 NOTE — TELEPHONE ENCOUNTER
PA started for [employer] started on covermy meds.      [employer] is reviewing your PA request and will respond within 24 hours for Medicaid or up to 72 hours for non-Medicaid plans, based on the required timeframe determined by state or federal regulations. To check for an update later, open this request from your dashboard.

## 2022-06-02 ENCOUNTER — TELEPHONE (OUTPATIENT)
Dept: FAMILY MEDICINE CLINIC | Facility: CLINIC | Age: 77
End: 2022-06-02

## 2022-06-02 NOTE — TELEPHONE ENCOUNTER
landy    Caller: YOLANDA     Relationship: Other/[employer]      Best call back number: [phone]    What form or medical record are you requesting: PRIOR AUTHORIZATION FORM    REQUIRING DOCUMENTATION    Who is requesting this form or medical record from you: [employer]/ [employer]    How would you like to receive the form or medical records (pick-up, mail, fax):   If fax, what is the fax number: [phone]      Timeframe paperwork needed: ASAP      Additional notes:     TURN AROUND TIME EXPIRES TOMORROW AT 10:23 AM EASTERN TIME.    THE PAPER WORK WAS SENT TO THE OFFICE ON 5-.

## 2022-06-06 ENCOUNTER — TELEPHONE (OUTPATIENT)
Dept: FAMILY MEDICINE CLINIC | Facility: CLINIC | Age: 77
End: 2022-06-06

## 2022-06-10 ENCOUNTER — OFFICE VISIT (OUTPATIENT)
Dept: FAMILY MEDICINE CLINIC | Facility: CLINIC | Age: 77
End: 2022-06-10

## 2022-06-10 VITALS
WEIGHT: 280.2 LBS | BODY MASS INDEX: 37.95 KG/M2 | DIASTOLIC BLOOD PRESSURE: 85 MMHG | TEMPERATURE: 98.2 F | RESPIRATION RATE: 16 BRPM | HEIGHT: 72 IN | HEART RATE: 65 BPM | SYSTOLIC BLOOD PRESSURE: 126 MMHG | OXYGEN SATURATION: 99 %

## 2022-06-10 DIAGNOSIS — R79.89 ELEVATED SERUM CREATININE: ICD-10-CM

## 2022-06-10 DIAGNOSIS — M25.551 RIGHT HIP PAIN: Primary | ICD-10-CM

## 2022-06-10 DIAGNOSIS — Z79.899 HIGH RISK MEDICATION USE: ICD-10-CM

## 2022-06-10 PROCEDURE — 99213 OFFICE O/P EST LOW 20 MIN: CPT | Performed by: FAMILY MEDICINE

## 2022-06-10 PROCEDURE — 73502 X-RAY EXAM HIP UNI 2-3 VIEWS: CPT | Performed by: FAMILY MEDICINE

## 2022-06-10 RX ORDER — HYDROCODONE BITARTRATE AND ACETAMINOPHEN 5; 325 MG/1; MG/1
1 TABLET ORAL 2 TIMES DAILY PRN
Qty: 30 TABLET | Refills: 0 | Status: SHIPPED | OUTPATIENT
Start: 2022-06-10 | End: 2022-06-15

## 2022-06-10 NOTE — PROGRESS NOTES
"Chief Complaint  Hip Pain (Right hip-while getting out of chair unable to bear weight on right leg-2 days)    Subjective        Isaac Suarez presents to [facility]  History of Present Illness  While getting out of the chair with R hip pain, rated 4/10, unable to put weight, has back problems before, no chest pain or shortness of breath, patient has a history of elevated creatinine, using  ibuprofen, he was not aware  about elevated creatinine history,  Objective   Vital Signs:  /85 (BP Location: Right arm, Patient Position: Sitting, Cuff Size: Large Adult)   Pulse 65   Temp 98.2 °F (36.8 °C) (Infrared)   Resp 16   Ht 182.9 cm (72\")   Wt 127 kg (280 lb 3.2 oz)   SpO2 99%   BMI 38.00 kg/m²   Estimated body mass index is 38 kg/m² as calculated from the following:    Height as of this encounter: 182.9 cm (72\").    Weight as of this encounter: 127 kg (280 lb 3.2 oz).          Physical Exam  Vitals and nursing note reviewed.   Constitutional:       General: He is not in acute distress.     Appearance: Normal appearance. He is well-developed. He is not ill-appearing, toxic-appearing or diaphoretic.   HENT:      Head: Normocephalic and atraumatic.      Right Ear: Tympanic membrane, ear canal and external ear normal. There is no impacted cerumen.      Left Ear: Tympanic membrane, ear canal and external ear normal. There is no impacted cerumen.      Nose: Nose normal. No congestion or rhinorrhea.      Mouth/Throat:      Mouth: Mucous membranes are moist.      Pharynx: Oropharynx is clear. No oropharyngeal exudate or posterior oropharyngeal erythema.   Eyes:      General: No scleral icterus.        Right eye: No discharge.         Left eye: No discharge.      Extraocular Movements: Extraocular movements intact.      Conjunctiva/sclera: Conjunctivae normal.      Pupils: Pupils are equal, round, and reactive to light.   Neck:      Thyroid: No thyromegaly.      Vascular: No carotid " bruit or JVD.      Trachea: No tracheal deviation.   Cardiovascular:      Rate and Rhythm: Normal rate and regular rhythm.      Heart sounds: Normal heart sounds. No murmur heard.    No friction rub. No gallop.   Pulmonary:      Effort: Pulmonary effort is normal. No respiratory distress.      Breath sounds: Normal breath sounds. No stridor. No wheezing, rhonchi or rales.   Chest:      Chest wall: No tenderness.   Abdominal:      General: Bowel sounds are normal. There is no distension.      Palpations: Abdomen is soft. There is no mass.      Tenderness: There is no abdominal tenderness. There is no right CVA tenderness, left CVA tenderness, guarding or rebound.      Hernia: No hernia is present.   Musculoskeletal:         General: Tenderness present. No signs of injury. Normal range of motion.      Cervical back: Normal range of motion and neck supple. No rigidity or tenderness.      Right lower leg: No edema.      Left lower leg: No edema.      Comments: Normal cervical thoracic and lumbar spine, antalgic gait, no tenderness on the right hip to palpation, but he has pain on the posterior R hip with flexion of the right lower extremity,   Lymphadenopathy:      Cervical: No cervical adenopathy.   Skin:     General: Skin is warm and dry.      Coloration: Skin is not jaundiced.   Neurological:      General: No focal deficit present.      Mental Status: He is alert and oriented to person, place, and time. Mental status is at baseline.      Sensory: No sensory deficit.      Motor: No weakness or abnormal muscle tone.      Coordination: Coordination normal.      Gait: Gait normal.      Deep Tendon Reflexes: Reflexes normal.   Psychiatric:         Mood and Affect: Mood normal.         Behavior: Behavior normal.         Thought Content: Thought content normal.         Judgment: Judgment normal.        Result Review :                Assessment and Plan   Diagnoses and all orders for this visit:    1. Right hip pain  (Primary)  -     XR Hip With or Without Pelvis 2 - 3 View Right  -     Cancel: Ambulatory Referral to Orthopedic Surgery  -     Ambulatory Referral to Orthopedic Surgery  -     HYDROcodone-acetaminophen (Norco) 5-325 MG per tablet; Take 1 tablet by mouth 2 (Two) Times a Day As Needed for Severe Pain .  Dispense: 30 tablet; Refill: 0    2. Elevated serum creatinine  -     Ambulatory Referral to Nephrology    3. High risk medication use  -     Compliance Drug Analysis, Ur - Urine, Clean Catch      Stop NSAIDs       Follow Up   No follow-ups on file.  Patient was given instructions and counseling regarding his condition or for health maintenance advice. Please see specific information pulled into the AVS if appropriate.     Patient on Wheelchair, does not want to get his own wheelchair, he is using a walker at home  Side effects discussed with patient in detail, all including but not limited to every single topic discussed   A R hip  X-Ray was ordered. My reading of this film is Fridays, and positive hardware on left hip and lumbar spine. (No comparison films available: pending review by Radiologist.)  Discussed with patient Norco  interaction with Lyrica including but not limited to shortness of breath, drowsiness, falls, etc.

## 2022-06-11 DIAGNOSIS — E78.5 HYPERLIPIDEMIA, UNSPECIFIED HYPERLIPIDEMIA TYPE: ICD-10-CM

## 2022-06-13 RX ORDER — EVOLOCUMAB 140 MG/ML
INJECTION, SOLUTION SUBCUTANEOUS
Qty: 2 ML | Refills: 5 | Status: SHIPPED | OUTPATIENT
Start: 2022-06-13 | End: 2022-09-09 | Stop reason: SDUPTHER

## 2022-06-13 NOTE — TELEPHONE ENCOUNTER
Rx Refill Note  Requested Prescriptions     Pending Prescriptions Disp Refills   • Repatha solution prefilled syringe injection [Pharmacy Med Name: REPATHA 140MG/ML INJ, 1ML] 2 mL 5     Sig: INJECT 1 ML UNDER THE SKIN INTO THE APPROPRIATE AREA AS DIRECTED EVERY 14 DAYS      Last office visit with prescribing clinician: 6/10/22     Next office visit with prescribing clinician: Visit date not found            Judy Plascencia MA  06/13/22, 09:50 EDT

## 2022-06-15 ENCOUNTER — OFFICE VISIT (OUTPATIENT)
Dept: PAIN MEDICINE | Facility: CLINIC | Age: 77
End: 2022-06-15

## 2022-06-15 VITALS
BODY MASS INDEX: 37.65 KG/M2 | HEART RATE: 76 BPM | OXYGEN SATURATION: 99 % | HEIGHT: 72 IN | DIASTOLIC BLOOD PRESSURE: 75 MMHG | RESPIRATION RATE: 20 BRPM | SYSTOLIC BLOOD PRESSURE: 143 MMHG | WEIGHT: 278 LBS | TEMPERATURE: 97.3 F

## 2022-06-15 DIAGNOSIS — M47.816 OSTEOARTHRITIS OF LUMBAR SPINE, UNSPECIFIED SPINAL OSTEOARTHRITIS COMPLICATION STATUS: ICD-10-CM

## 2022-06-15 DIAGNOSIS — M46.1 SACROILIITIS: ICD-10-CM

## 2022-06-15 DIAGNOSIS — M48.062 SPINAL STENOSIS OF LUMBAR REGION WITH NEUROGENIC CLAUDICATION: ICD-10-CM

## 2022-06-15 DIAGNOSIS — G89.29 OTHER CHRONIC PAIN: Primary | ICD-10-CM

## 2022-06-15 DIAGNOSIS — M96.1 POSTLAMINECTOMY SYNDROME OF LUMBAR REGION: ICD-10-CM

## 2022-06-15 LAB — SPECIMEN STATUS: NORMAL

## 2022-06-15 PROCEDURE — 99214 OFFICE O/P EST MOD 30 MIN: CPT | Performed by: NURSE PRACTITIONER

## 2022-06-15 RX ORDER — LISINOPRIL 10 MG/1
10 TABLET ORAL DAILY
COMMUNITY
End: 2022-07-05 | Stop reason: SDUPTHER

## 2022-06-15 RX ORDER — METHYLPREDNISOLONE 4 MG/1
TABLET ORAL
Qty: 21 TABLET | Refills: 0 | Status: SHIPPED | OUTPATIENT
Start: 2022-06-15 | End: 2022-07-05

## 2022-06-15 RX ORDER — OXYCODONE HYDROCHLORIDE AND ACETAMINOPHEN 5; 325 MG/1; MG/1
1-2 TABLET ORAL EVERY 4 HOURS PRN
COMMUNITY
Start: 2022-01-07 | End: 2022-06-15

## 2022-06-15 RX ORDER — FUROSEMIDE 20 MG/1
20 TABLET ORAL DAILY
COMMUNITY
End: 2022-07-05 | Stop reason: SDUPTHER

## 2022-06-15 RX ORDER — CLINDAMYCIN HYDROCHLORIDE 300 MG/1
300 CAPSULE ORAL 3 TIMES DAILY
COMMUNITY
Start: 2022-01-07 | End: 2022-06-15

## 2022-06-15 NOTE — PROGRESS NOTES
CHIEF COMPLAINT  F/u back pain. Pt had SACROILIAC INJECTION--left and sts receiving minimal relief on left side, now right side has flared up since last Thursday.    Subjective   Isaac Suarez is a 76 y.o. male  who presents for follow-up.  He has a history of chronic back pain. Reports his pain is variable since last evaluation.    Patient presents for follow-up of PROCEDURE. Patient had a LEFT SI Joint injection performed by Dr. HUNT on 5-9-22 for management of LOW BACK PAIN. Patient reports 50% Temporary relief from the procedure.    Reports last week, was getting ready for bed. Had significant pain in his right low back and hip and couldn't put weight on his right leg.  Saw PCP and ordered xray. Was prescribed Percocet. He reports he took 2 since obtaining the prescription. No real relief with Percocet.     Complains of pain in his low back, right worse than left. The pain can radiate down his right posterior leg. Stops generally around his knee. Has been noticing some numbness in his right foot as well. Pain increases with movement, activity, walking, standing, laying flat pain decreases with rest, changing position and procedures. Has been taking Tylenol. No longer taking Ibuprofen. ADL's by self. Denies any bowel or bladder changes.     Patient remained masked during entire encounter. No cough present. I donned a mask and eye protection throughout entire visit. Prior to donning mask and eye protection, hand hygiene was performed, as well as when it was doffed.  I was closer than 6 feet, but not for an extended period of time. No obvious exposure to any bodily fluids.    History of Present Illness             Was initially evaluated as a new patient by Dr. Irasema Hunt on 7/27/2021.  He was referred here by his PCP Dr. Li off it for evaluation of back pain.  Previously was seen at [facility] and was unhappy with his previous pain experience.  He states that he wants to avoid opioids unless  absolutely necessary.  Baseline urine drug screen was obtained.  Plan for L3-L4 epidural steroid injection.  Plan to follow-up 4 weeks after procedure or sooner if needed.     The patient has a pain history of the following:  Chronic low back pain  Lumbar stenosis   History of Lumbar Surgery   Peripheral neuropathy     Previous interventions that the patient has received include:   LEFT SI 5-9-22-- moderate relief  Left SI 12-8-21-- 60% for 1 month   L2-L3 LESI 9-10-21-- 50% ONGOING  L2-L3 LESI- 8-6-21-- 80-90% relief for 2 weeks, then 30% ongoing, improved ability to walk  Radiofrequency ablation - helped some   Sacroiliac joint injections 1/3/18, 12/6/17  Right trochanteric bursa injection   Lumbar epidural steroid injections      Pain medications include:  Lyrica   Advil      Previously: Medrol dose pack     Other conservative modalities which the patient reports using include:  Physical Therapy: yes - no improvement  Neck or back surgery: yes  Past pain management: yes - [facility]     Past Significant Surgical History:  L4-5 fusion - Dr. Frias   Right sacroiliac joint fusion     PEG Assessment   What number best describes your pain on average in the past week?6  What number best describes how, during the past week, pain has interfered with your enjoyment of life?6  What number best describes how, during the past week, pain has interfered with your general activity?  6    The following portions of the patient's history were reviewed and updated as appropriate: allergies, current medications, past family history, past medical history, past social history, past surgical history and problem list.    Review of Systems   Constitutional: Positive for activity change (dec) and fatigue (occ). Negative for fever.   HENT: Negative for congestion.    Eyes: Negative for visual disturbance.   Respiratory: Negative for cough and shortness of breath.    Cardiovascular: Negative for chest pain.   Gastrointestinal: Negative  "for constipation and diarrhea.   Genitourinary: Negative for difficulty urinating.   Musculoskeletal: Positive for back pain.   Neurological: Positive for numbness (right foot). Negative for dizziness, weakness, light-headedness and headaches.   Psychiatric/Behavioral: Positive for sleep disturbance (occ). Negative for agitation and suicidal ideas. The patient is not nervous/anxious.      I have reviewed and confirmed the accuracy of the ROS as documented by the MA/LPN/RN ROSIO Mcfarland      Vitals:    06/15/22 0911   BP: 143/75   Pulse: 76   Resp: 20   Temp: 97.3 °F (36.3 °C)   SpO2: 99%   Weight: 126 kg (278 lb)   Height: 182.9 cm (72\")   PainSc:   3   PainLoc: Back     Objective   Physical Exam  Vitals and nursing note reviewed.   Constitutional:       Appearance: He is well-developed.   HENT:      Head: Normocephalic and atraumatic.   Musculoskeletal:      Lumbar back: Tenderness and bony tenderness present. Decreased range of motion. Positive right straight leg raise test. Negative left straight leg raise test.      Left knee: Swelling and bony tenderness present. Decreased range of motion.      Comments: Surgical scar of lumbar spine     Neurological:      Mental Status: He is alert.      Gait: Gait abnormal (walking with cane--- due to knee pain).      Deep Tendon Reflexes:      Reflex Scores:       Patellar reflexes are 0 on the right side and 0 on the left side.  Psychiatric:         Speech: Speech normal.         Behavior: Behavior normal.         Thought Content: Thought content normal.         Judgment: Judgment normal.         Assessment & Plan   Diagnoses and all orders for this visit:    1. Other chronic pain (Primary)    2. Spinal stenosis of lumbar region with neurogenic claudication  -     Cancel: MRI Lumbar Spine With & Without Contrast; Future  -     MRI Lumbar Spine With & Without Contrast; Future    3. Osteoarthritis of lumbar spine, unspecified spinal osteoarthritis complication " status  -     Cancel: MRI Lumbar Spine With & Without Contrast; Future  -     MRI Lumbar Spine With & Without Contrast; Future    4. Sacroiliitis (HCC)    5. Postlaminectomy syndrome of lumbar region  -     Cancel: MRI Lumbar Spine With & Without Contrast; Future  -     MRI Lumbar Spine With & Without Contrast; Future    Other orders  -     methylPREDNISolone (MEDROL) 4 MG dose pack; Take as directed on package instructions.  Dispense: 21 tablet; Refill: 0      --- Medrol Dosepack. Discussed medication with the patient.  Included in this discussion was the potential for side effects and adverse events.  Patient verbalized understanding and wished to proceed.  Prescription will be sent to pharmacy.  --- MRI-lumbar spine-- patient requests open MRI.  --- Consider neurosurgical re-evaluation.  --- Follow-up 6 weeks or sooner if needed.       NEHA REPORT  As the clinician, I personally reviewed the NEHA from 6-15-22 while the patient was in the office today.       Dictated utilizing Dragon dictation.     This document is intended for medical expert use only. Reading of this document by patients and/or patient's family without participating medical staff guidance may result in misinterpretation and unintended morbidity.   Any interpretation of such data is the responsibility of the patient and/or family member responsible for the patient in concert with their primary or specialist providers, not to be left for sources of online searches such as [employer], "[employer] or similar queries. Relying on these approaches to knowledge may result in misinterpretation, misguided goals of care and even death should patients or family members try recommendations outside of the realm of professional medical care in a supervised way.

## 2022-06-16 ENCOUNTER — TELEPHONE (OUTPATIENT)
Dept: FAMILY MEDICINE CLINIC | Facility: CLINIC | Age: 77
End: 2022-06-16

## 2022-06-16 NOTE — TELEPHONE ENCOUNTER
Spoke with patient and he stated that he is not coming back in for a urine drug test.  He stated that he left 2 while he was here in the office.  I made patient appointment to come back in and discuss with provider.

## 2022-06-16 NOTE — TELEPHONE ENCOUNTER
leannamatilda Galvan for hub to read    Lab was unable to run urine drug screen Per. Dr. Westfall it needs to be recollected.

## 2022-06-16 NOTE — TELEPHONE ENCOUNTER
PATIENT CALLED REGARDING HIS LAST VISIT WITH DR ABEL, AND WAS TOLD THAT LABS SHOWED SOME NUMBERS WERE ELEVATED DUE TO IBUPROFEN MEDS      DR ABEL WANTED TO REFER PT TO A  KIDNEY SPECIALIST     PATIENT WANTED DR LAMAS TO GO OVER ALL HIS LABS AND SEE IF THAT IS THE RIGHT DECISION       PLEASE CALL AND DISCUSS OPTIONS     Isaac Suarez (Self) [phone] (H)

## 2022-06-27 ENCOUNTER — HOSPITAL ENCOUNTER (OUTPATIENT)
Dept: CARDIOLOGY | Facility: HOSPITAL | Age: 77
Discharge: HOME | End: 2022-06-27

## 2022-06-27 ENCOUNTER — TRANSCRIBE ORDERS (OUTPATIENT)
Dept: ADMINISTRATIVE | Facility: HOSPITAL | Age: 77
End: 2022-06-27

## 2022-06-27 ENCOUNTER — LAB (OUTPATIENT)
Dept: LAB | Facility: HOSPITAL | Age: 77
End: 2022-06-27

## 2022-06-27 DIAGNOSIS — Z01.812 PRE-OPERATIVE LABORATORY EXAMINATION: Primary | ICD-10-CM

## 2022-06-27 DIAGNOSIS — Z01.812 PRE-OPERATIVE LABORATORY EXAMINATION: ICD-10-CM

## 2022-06-27 DIAGNOSIS — M17.11 ARTHRITIS OF RIGHT KNEE: ICD-10-CM

## 2022-06-27 DIAGNOSIS — R79.9 ABNORMAL FINDING OF BLOOD CHEMISTRY, UNSPECIFIED: ICD-10-CM

## 2022-06-27 LAB
ANION GAP SERPL CALCULATED.3IONS-SCNC: 9.1 MMOL/L (ref 5–15)
BASOPHILS # BLD AUTO: 0.04 10*3/MM3 (ref 0–0.2)
BASOPHILS NFR BLD AUTO: 0.5 % (ref 0–1.5)
BUN SERPL-MCNC: 41 MG/DL (ref 8–23)
BUN/CREAT SERPL: 23.2 (ref 7–25)
CALCIUM SPEC-SCNC: 9.9 MG/DL (ref 8.6–10.5)
CHLORIDE SERPL-SCNC: 104 MMOL/L (ref 98–107)
CO2 SERPL-SCNC: 22.9 MMOL/L (ref 22–29)
CREAT SERPL-MCNC: 1.77 MG/DL (ref 0.76–1.27)
DEPRECATED RDW RBC AUTO: 44.9 FL (ref 37–54)
EGFRCR SERPLBLD CKD-EPI 2021: 39.6 ML/MIN/1.73
EOSINOPHIL # BLD AUTO: 0.26 10*3/MM3 (ref 0–0.4)
EOSINOPHIL NFR BLD AUTO: 3.2 % (ref 0.3–6.2)
ERYTHROCYTE [DISTWIDTH] IN BLOOD BY AUTOMATED COUNT: 13.2 % (ref 12.3–15.4)
GLUCOSE SERPL-MCNC: 104 MG/DL (ref 65–99)
HCT VFR BLD AUTO: 36.9 % (ref 37.5–51)
HGB BLD-MCNC: 12.6 G/DL (ref 13–17.7)
IMM GRANULOCYTES # BLD AUTO: 0.11 10*3/MM3 (ref 0–0.05)
IMM GRANULOCYTES NFR BLD AUTO: 1.4 % (ref 0–0.5)
LYMPHOCYTES # BLD AUTO: 1.23 10*3/MM3 (ref 0.7–3.1)
LYMPHOCYTES NFR BLD AUTO: 15.3 % (ref 19.6–45.3)
MCH RBC QN AUTO: 31.8 PG (ref 26.6–33)
MCHC RBC AUTO-ENTMCNC: 34.1 G/DL (ref 31.5–35.7)
MCV RBC AUTO: 93.2 FL (ref 79–97)
MONOCYTES # BLD AUTO: 0.75 10*3/MM3 (ref 0.1–0.9)
MONOCYTES NFR BLD AUTO: 9.3 % (ref 5–12)
NEUTROPHILS NFR BLD AUTO: 5.65 10*3/MM3 (ref 1.7–7)
NEUTROPHILS NFR BLD AUTO: 70.3 % (ref 42.7–76)
NRBC BLD AUTO-RTO: 0 /100 WBC (ref 0–0.2)
PLATELET # BLD AUTO: 241 10*3/MM3 (ref 140–450)
PMV BLD AUTO: 8.8 FL (ref 6–12)
POTASSIUM SERPL-SCNC: 5.4 MMOL/L (ref 3.5–5.2)
QT INTERVAL: 359 MS
RBC # BLD AUTO: 3.96 10*6/MM3 (ref 4.14–5.8)
SODIUM SERPL-SCNC: 136 MMOL/L (ref 136–145)
WBC NRBC COR # BLD: 8.04 10*3/MM3 (ref 3.4–10.8)

## 2022-06-27 PROCEDURE — 93005 ELECTROCARDIOGRAM TRACING: CPT | Performed by: ORTHOPAEDIC SURGERY

## 2022-06-27 PROCEDURE — 93010 ELECTROCARDIOGRAM REPORT: CPT | Performed by: INTERNAL MEDICINE

## 2022-06-27 PROCEDURE — 36415 COLL VENOUS BLD VENIPUNCTURE: CPT

## 2022-06-27 PROCEDURE — 85025 COMPLETE CBC W/AUTO DIFF WBC: CPT

## 2022-06-27 PROCEDURE — 80048 BASIC METABOLIC PNL TOTAL CA: CPT

## 2022-07-05 ENCOUNTER — OFFICE VISIT (OUTPATIENT)
Dept: FAMILY MEDICINE CLINIC | Facility: CLINIC | Age: 77
End: 2022-07-05

## 2022-07-05 VITALS
BODY MASS INDEX: 37.97 KG/M2 | TEMPERATURE: 98.2 F | WEIGHT: 280 LBS | SYSTOLIC BLOOD PRESSURE: 132 MMHG | DIASTOLIC BLOOD PRESSURE: 82 MMHG

## 2022-07-05 DIAGNOSIS — N18.31 STAGE 3A CHRONIC KIDNEY DISEASE: Primary | ICD-10-CM

## 2022-07-05 DIAGNOSIS — M54.16 LUMBAR RADICULOPATHY: ICD-10-CM

## 2022-07-05 DIAGNOSIS — G60.9 IDIOPATHIC PERIPHERAL NEUROPATHY: ICD-10-CM

## 2022-07-05 DIAGNOSIS — M48.062 SPINAL STENOSIS OF LUMBAR REGION WITH NEUROGENIC CLAUDICATION: ICD-10-CM

## 2022-07-05 PROBLEM — M17.12 PRIMARY OSTEOARTHRITIS OF LEFT KNEE: Status: ACTIVE | Noted: 2021-11-19

## 2022-07-05 PROBLEM — M16.11 PRIMARY OSTEOARTHRITIS OF RIGHT HIP: Status: ACTIVE | Noted: 2022-07-05

## 2022-07-05 PROCEDURE — 99213 OFFICE O/P EST LOW 20 MIN: CPT | Performed by: INTERNAL MEDICINE

## 2022-07-05 RX ORDER — PREGABALIN 150 MG/1
150 CAPSULE ORAL 2 TIMES DAILY
Qty: 180 CAPSULE | Refills: 1 | Status: SHIPPED | OUTPATIENT
Start: 2022-07-05 | End: 2022-12-21 | Stop reason: SDUPTHER

## 2022-07-05 NOTE — PROGRESS NOTES
"Subjective   Isaac Suarez is a 76 y.o. male.     Chief Complaint   Patient presents with   • Knee Pain   • Hip Pain     Follow up from previous visit        History of Present Illness   Was seen and evaluated by Dr. Hernandez on 6/10/2022 noting to have an acute onset right hip pain approximately 6/8/2022 and was given hydrocodone and referred back to pain management and Ortho.  He was also noted to have an elevated creatinine and BUN and was referred to nephrology but patient refused.  Subsequently did see pain management on 6/15/2022 and was given a Medrol Dosepak and MRI of the lumbar spine was ordered.  This is still pending at this time.  X-ray of the hip on 6/10/2022 demonstrated only degenerative changes in the hip and spine.  MRI of lumbar is scheduled for later today as is still having back pain and sciatica.  Symptoms are some better.    Patient has scheduled right knee replacement later this month.  Had left TKR 1/2022 and doing well.    Patient past history of back \"History of low back pain that radiates down the right leg with weakness in the right leg in the past and intermittent numbness.  Had been seen by Dr. Frias 6 years ago and has had EMG with Dr. Gomez showing abnormal study slowing severe peripheral neuropathy with significant axonal loss.  MRI on 10/9/2017 showed degenerative disc disease L4-L5 fusion.  Having difficulty walking secondary to pain and can walk only approximately 50 yards.  Was seen by pain management at [facility] pain management in distant past and underwent radiofrequency ablation which did help some.  Has not been seen for 6 years.  MRI was ordered that showed protruding disc at the L3-L4 level with a moderate central canal stenosis and some arthritis with swelling on both sides of the disc causing narrowing of the space that the nerve exits the spine on both sides.  Was then referred back to Dr. Loco pain management and [facility].\"    Follow-up for " hypertension.  Currently, has been feeling well and asymptomatic without any headaches, vision changes, cough, chest pain, shortness of breath, swelling, focal neurologic deficit, memory loss or syncope.  Has been taking the medications regularly and adherent with the regimen of furosemide 20 mg in the morning and lisinopril 10 mg daily.  Denies medication side effects and no significant interval events.       Follow-up for cholesterol.  Currently, has been feeling well without any myalgias, muscle aches, weakness, numbness, chest pain, short of breath or other issues.  Currently, is not on any regimen secondary to intolerance to zetia and statins and insurance denied the Nexletol 180 mg daily and Repatha every 14 days saying will only cover if cardiology prescribed.    Is trying the Repatha again.       The following portions of the patient's history were reviewed and updated as appropriate: allergies, current medications, past family history, past medical history, past social history, past surgical history and problem list.    Depression Screen:  PHQ-2/PHQ-9 Depression Screening 4/25/2022   Retired PHQ-9 Total Score -   Retired Total Score -   Little Interest or Pleasure in Doing Things 0-->not at all   Feeling Down, Depressed or Hopeless 0-->not at all   PHQ-9: Brief Depression Severity Measure Score 0       Past Medical History:   Diagnosis Date   • Abnormal MRI, lumbar spine    • Allergic rhinitis    • Arthritis    • Bilateral hearing loss    • Borderline blood pressure    • BPH (benign prostatic hyperplasia)    • Cancer (HCC)     PROSTATE   • Chronic back pain     when walking and lying down   • Chronic kidney disease (CKD), stage I    • Cough    • Edema    • Elevated transaminase level    • Erectile dysfunction    • External hemorrhoids    • GERD (gastroesophageal reflux disease)    • High cholesterol    • History of colon polyps    • History of depression    • History of prostate cancer     RADIATION TX   •  Hyperlipidemia    • Hypertension    • Left knee pain    • Leg cramps    • Low back pain    • Lumbar stenosis    • Muscle spasm    • Prostate cancer (HCC)    • Renal cyst    • RLS (restless legs syndrome)    • Statin intolerance    • Vitamin D deficiency        Past Surgical History:   Procedure Laterality Date   • CATARACT EXTRACTION Right    • COLONOSCOPY N/A 08/15/2016    Procedure: COLONOSCOPY INTO CECUM WITH COLD SNARE POLYPECTOMY;  Surgeon: Loc Lee MD;  Location: [facility] ENDOSCOPY;  Service:    • HERNIA REPAIR      bilat inguinal   • INSERTION PROSTATE RADIATION SEED     • JOINT REPLACEMENT Left 01/05/2022    left knee replacement   • LUMBAR DISCECTOMY FUSION INSTRUMENTATION N/A 09/23/2016    Procedure: L 4-5 LUMBAR DISCECTOMY FUSION WITH INSTRUMENTATION;  Surgeon: Michael Frias MD;  Location: [facility] MAIN OR;  Service:    • LUMBAR EPIDURAL INJECTION N/A 08/06/2021    Procedure: LUMBAR EPIDURAL 1ST VISIT 3-4;  Surgeon: Irasema Loco MD;  [place] [facility] [place]  Service: Pain Management;  Laterality: N/A;   • LUMBAR EPIDURAL INJECTION N/A 09/10/2021    Procedure: lumbar epidural steroid injection lumbar2-3;  Surgeon: Irasema Loco MD;  Location[facility];  Service: Pain Management;  Laterality: N/A;   • PROSTATE BIOPSY      Needle biopsy   • ROTATOR CUFF REPAIR Bilateral     metal in both   • SACROILIAC JOINT INJECTION Left 12/08/2021    Procedure: SACROILIAC INJECTION-- left;  Surgeon: Irasema Loco MD;  Location: [facility];  Service: Pain Management;  Laterality: Left;   • SACROILIAC JOINT INJECTION Left 5/9/2022    Procedure: SACROILIAC INJECTION--left;  Surgeon: Irasema Loco MD;  [place] [facility] [place]  Service: Pain Management;  Laterality: Left;   • TOTAL SHOULDER ARTHROPLASTY Bilateral    • VASECTOMY         Family History   Problem Relation Age of Onset   • Cancer Father         BRAIN   • Esophageal cancer Other        Social History     Socioeconomic History   •  Marital status:    • Years of education: 12TH GRADE   Tobacco Use   • Smoking status: Former Smoker     Packs/day: 2.00     Years: 12.00     Pack years: 24.00     Types: Cigarettes     Quit date:      Years since quittin.5   • Smokeless tobacco: Never Used   Vaping Use   • Vaping Use: Never used   Substance and Sexual Activity   • Alcohol use: Yes     Alcohol/week: 2.0 standard drinks     Types: 2 Standard drinks or equivalent per week     Comment: SOCIALLY-Drinks 7 or less drinks per week   • Drug use: No   • Sexual activity: Yes     Partners: Female     Birth control/protection: None       Current Outpatient Medications   Medication Sig Dispense Refill   • pregabalin (LYRICA) 150 MG capsule Take 1 capsule by mouth 2 (Two) Times a Day. 180 capsule 1   • apixaban (ELIQUIS) 2.5 MG tablet tablet Take 1 tablet by mouth 2 (Two) Times a Day.     • furosemide (LASIX) 20 MG tablet Take 1 tablet by mouth Every Morning. 90 tablet 1   • levocetirizine (XYZAL) 5 MG tablet Take 5 mg by mouth Every Evening.     • lisinopril (PRINIVIL,ZESTRIL) 10 MG tablet Take 1 tablet by mouth Every Morning. 90 tablet 1   • Lumigan 0.01 % ophthalmic drops      • mupirocin (BACTROBAN) 2 % ointment mupirocin 2 % topical ointment   APPLY A SMALL AMOUNT TO THE NARES BY TOPICAL ROUTE 2 TIMES PER DAY FOR 5 DAYS PRIOR TO SURGERY     • Repatha solution prefilled syringe injection INJECT 1 ML UNDER THE SKIN INTO THE APPROPRIATE AREA AS DIRECTED EVERY 14 DAYS 2 mL 5     No current facility-administered medications for this visit.       Review of Systems   Constitutional: Negative for activity change, appetite change, fatigue, fever, unexpected weight gain and unexpected weight loss.   HENT: Negative for nosebleeds, rhinorrhea, trouble swallowing and voice change.    Eyes: Negative for visual disturbance.   Respiratory: Negative for cough, chest tightness, shortness of breath and wheezing.    Cardiovascular: Negative for chest pain,  palpitations and leg swelling.   Gastrointestinal: Negative for abdominal pain, blood in stool, constipation, diarrhea, nausea, vomiting, GERD and indigestion.   Genitourinary: Negative for dysuria, frequency and hematuria.   Musculoskeletal: Positive for arthralgias and back pain. Negative for myalgias.   Skin: Negative for rash and wound.   Neurological: Negative for dizziness, tremors, weakness, light-headedness, numbness, headache and memory problem.   Hematological: Negative for adenopathy. Does not bruise/bleed easily.   Psychiatric/Behavioral: Negative for sleep disturbance and depressed mood. The patient is not nervous/anxious.        Objective   /82 (BP Location: Right arm, Patient Position: Sitting, Cuff Size: Adult)   Temp 98.2 °F (36.8 °C) (Temporal)   Wt 127 kg (280 lb)   BMI 37.97 kg/m²     Physical Exam  Vitals and nursing note reviewed.   Constitutional:       General: He is not in acute distress.     Appearance: He is well-developed. He is obese. He is not diaphoretic.   HENT:      Head: Normocephalic and atraumatic.      Right Ear: External ear normal.      Left Ear: External ear normal.      Nose: Nose normal.   Eyes:      Conjunctiva/sclera: Conjunctivae normal.      Pupils: Pupils are equal, round, and reactive to light.   Neck:      Thyroid: No thyromegaly.      Trachea: No tracheal deviation.   Cardiovascular:      Rate and Rhythm: Normal rate and regular rhythm.      Heart sounds: Normal heart sounds. No murmur heard.    No friction rub. No gallop.   Pulmonary:      Effort: Pulmonary effort is normal. No respiratory distress.      Breath sounds: Normal breath sounds.   Abdominal:      General: Bowel sounds are normal.      Palpations: Abdomen is soft. There is no mass.      Tenderness: There is no abdominal tenderness. There is no guarding.   Musculoskeletal:         General: Normal range of motion.      Cervical back: Normal range of motion and neck supple.      Comments: Using  cane.   Lymphadenopathy:      Cervical: No cervical adenopathy.   Skin:     General: Skin is warm and dry.      Capillary Refill: Capillary refill takes less than 2 seconds.      Findings: No rash.   Neurological:      Mental Status: He is alert and oriented to person, place, and time.      Motor: No abnormal muscle tone.      Deep Tendon Reflexes: Reflexes normal.   Psychiatric:         Behavior: Behavior normal.         Thought Content: Thought content normal.         Judgment: Judgment normal.         Recent Results (from the past 2016 hour(s))   Specimen Status Report    Collection Time: 06/10/22 11:19 AM   Result Value Ref Range    Specimen Status CANCELED    Basic Metabolic Panel    Collection Time: 06/27/22  1:59 PM    Specimen: Blood   Result Value Ref Range    Glucose 104 (H) 65 - 99 mg/dL    BUN 41 (H) 8 - 23 mg/dL    Creatinine 1.77 (H) 0.76 - 1.27 mg/dL    Sodium 136 136 - 145 mmol/L    Potassium 5.4 (H) 3.5 - 5.2 mmol/L    Chloride 104 98 - 107 mmol/L    CO2 22.9 22.0 - 29.0 mmol/L    Calcium 9.9 8.6 - 10.5 mg/dL    BUN/Creatinine Ratio 23.2 7.0 - 25.0    Anion Gap 9.1 5.0 - 15.0 mmol/L    eGFR 39.6 (L) >60.0 mL/min/1.73   CBC Auto Differential    Collection Time: 06/27/22  1:59 PM    Specimen: Blood   Result Value Ref Range    WBC 8.04 3.40 - 10.80 10*3/mm3    RBC 3.96 (L) 4.14 - 5.80 10*6/mm3    Hemoglobin 12.6 (L) 13.0 - 17.7 g/dL    Hematocrit 36.9 (L) 37.5 - 51.0 %    MCV 93.2 79.0 - 97.0 fL    MCH 31.8 26.6 - 33.0 pg    MCHC 34.1 31.5 - 35.7 g/dL    RDW 13.2 12.3 - 15.4 %    RDW-SD 44.9 37.0 - 54.0 fl    MPV 8.8 6.0 - 12.0 fL    Platelets 241 140 - 450 10*3/mm3    Neutrophil % 70.3 42.7 - 76.0 %    Lymphocyte % 15.3 (L) 19.6 - 45.3 %    Monocyte % 9.3 5.0 - 12.0 %    Eosinophil % 3.2 0.3 - 6.2 %    Basophil % 0.5 0.0 - 1.5 %    Immature Grans % 1.4 (H) 0.0 - 0.5 %    Neutrophils, Absolute 5.65 1.70 - 7.00 10*3/mm3    Lymphocytes, Absolute 1.23 0.70 - 3.10 10*3/mm3    Monocytes, Absolute 0.75 0.10  - 0.90 10*3/mm3    Eosinophils, Absolute 0.26 0.00 - 0.40 10*3/mm3    Basophils, Absolute 0.04 0.00 - 0.20 10*3/mm3    Immature Grans, Absolute 0.11 (H) 0.00 - 0.05 10*3/mm3    nRBC 0.0 0.0 - 0.2 /100 WBC   ECG 12 Lead    Collection Time: 06/27/22  2:08 PM   Result Value Ref Range    QT Interval 359 ms     Assessment & Plan   Diagnoses and all orders for this visit:    1. Stage 3a chronic kidney disease (HCC) (Primary)    2. Idiopathic peripheral neuropathy  -     pregabalin (LYRICA) 150 MG capsule; Take 1 capsule by mouth 2 (Two) Times a Day.  Dispense: 180 capsule; Refill: 1    3. Spinal stenosis of lumbar region with neurogenic claudication    4. Lumbar radiculopathy    Improved renal fuction back to baseline.  Will await the MRI later today and the right TKR later this month.           · COVID-19 Precautions - Patient was compliant in wearing a mask. When I saw the patient, I used appropriate personal protective equipment (PPE) including mask and eye shield (standard procedure).  Additionally, I used gown and gloves if indicated.  Hand hygiene was completed before and after seeing the patient.  · Dictated utilizing Dragon Dictation

## 2022-07-31 ENCOUNTER — APPOINTMENT (OUTPATIENT)
Dept: GENERAL RADIOLOGY | Facility: HOSPITAL | Age: 77
End: 2022-07-31

## 2022-07-31 ENCOUNTER — HOSPITAL ENCOUNTER (EMERGENCY)
Facility: HOSPITAL | Age: 77
Discharge: HOME | End: 2022-07-31
Attending: EMERGENCY MEDICINE | Admitting: EMERGENCY MEDICINE

## 2022-07-31 ENCOUNTER — APPOINTMENT (OUTPATIENT)
Dept: CARDIOLOGY | Facility: HOSPITAL | Age: 77
End: 2022-07-31

## 2022-07-31 VITALS
HEIGHT: 72 IN | DIASTOLIC BLOOD PRESSURE: 67 MMHG | HEART RATE: 84 BPM | WEIGHT: 278 LBS | RESPIRATION RATE: 18 BRPM | BODY MASS INDEX: 37.65 KG/M2 | OXYGEN SATURATION: 97 % | SYSTOLIC BLOOD PRESSURE: 125 MMHG | TEMPERATURE: 97.3 F

## 2022-07-31 DIAGNOSIS — K59.00 CONSTIPATION, UNSPECIFIED CONSTIPATION TYPE: ICD-10-CM

## 2022-07-31 DIAGNOSIS — K56.41 FECAL IMPACTION: Primary | ICD-10-CM

## 2022-07-31 DIAGNOSIS — R20.2 PARESTHESIAS: ICD-10-CM

## 2022-07-31 LAB

## 2022-07-31 PROCEDURE — 99284 EMERGENCY DEPT VISIT MOD MDM: CPT

## 2022-07-31 PROCEDURE — 93970 EXTREMITY STUDY: CPT

## 2022-07-31 PROCEDURE — 74018 RADEX ABDOMEN 1 VIEW: CPT

## 2022-07-31 RX ORDER — OXYCODONE HYDROCHLORIDE AND ACETAMINOPHEN 5; 325 MG/1; MG/1
1 TABLET ORAL ONCE
Status: COMPLETED | OUTPATIENT
Start: 2022-07-31 | End: 2022-07-31

## 2022-07-31 RX ADMIN — OXYCODONE AND ACETAMINOPHEN 1 TABLET: 5; 325 TABLET ORAL at 10:57

## 2022-08-03 ENCOUNTER — APPOINTMENT (OUTPATIENT)
Dept: GENERAL RADIOLOGY | Facility: HOSPITAL | Age: 77
End: 2022-08-03

## 2022-08-03 LAB
ALBUMIN SERPL-MCNC: 4.6 G/DL (ref 3.5–5.2)
ALBUMIN/GLOB SERPL: 1.6 G/DL
ALP SERPL-CCNC: 75 U/L (ref 39–117)
ALT SERPL W P-5'-P-CCNC: 15 U/L (ref 1–41)
ANION GAP SERPL CALCULATED.3IONS-SCNC: 15.4 MMOL/L (ref 5–15)
AST SERPL-CCNC: 21 U/L (ref 1–40)
BASOPHILS # BLD AUTO: 0.04 10*3/MM3 (ref 0–0.2)
BASOPHILS NFR BLD AUTO: 0.3 % (ref 0–1.5)
BILIRUB SERPL-MCNC: 0.7 MG/DL (ref 0–1.2)
BUN SERPL-MCNC: 31 MG/DL (ref 8–23)
BUN/CREAT SERPL: 21.8 (ref 7–25)
CALCIUM SPEC-SCNC: 9.9 MG/DL (ref 8.6–10.5)
CHLORIDE SERPL-SCNC: 99 MMOL/L (ref 98–107)
CO2 SERPL-SCNC: 21.6 MMOL/L (ref 22–29)
CREAT SERPL-MCNC: 1.42 MG/DL (ref 0.76–1.27)
DEPRECATED RDW RBC AUTO: 42.3 FL (ref 37–54)
EGFRCR SERPLBLD CKD-EPI 2021: 51.5 ML/MIN/1.73
EOSINOPHIL # BLD AUTO: 0.32 10*3/MM3 (ref 0–0.4)
EOSINOPHIL NFR BLD AUTO: 2.5 % (ref 0.3–6.2)
ERYTHROCYTE [DISTWIDTH] IN BLOOD BY AUTOMATED COUNT: 13.1 % (ref 12.3–15.4)
GLOBULIN UR ELPH-MCNC: 2.9 GM/DL
GLUCOSE SERPL-MCNC: 113 MG/DL (ref 65–99)
HCT VFR BLD AUTO: 33.9 % (ref 37.5–51)
HGB BLD-MCNC: 11.9 G/DL (ref 13–17.7)
IMM GRANULOCYTES # BLD AUTO: 0.54 10*3/MM3 (ref 0–0.05)
IMM GRANULOCYTES NFR BLD AUTO: 4.2 % (ref 0–0.5)
LYMPHOCYTES # BLD AUTO: 1.11 10*3/MM3 (ref 0.7–3.1)
LYMPHOCYTES NFR BLD AUTO: 8.5 % (ref 19.6–45.3)
MCH RBC QN AUTO: 31.6 PG (ref 26.6–33)
MCHC RBC AUTO-ENTMCNC: 35.1 G/DL (ref 31.5–35.7)
MCV RBC AUTO: 89.9 FL (ref 79–97)
MONOCYTES # BLD AUTO: 0.74 10*3/MM3 (ref 0.1–0.9)
MONOCYTES NFR BLD AUTO: 5.7 % (ref 5–12)
NEUTROPHILS NFR BLD AUTO: 10.24 10*3/MM3 (ref 1.7–7)
NEUTROPHILS NFR BLD AUTO: 78.8 % (ref 42.7–76)
NRBC BLD AUTO-RTO: 0.1 /100 WBC (ref 0–0.2)
PLATELET # BLD AUTO: 353 10*3/MM3 (ref 140–450)
PMV BLD AUTO: 8.7 FL (ref 6–12)
POTASSIUM SERPL-SCNC: 4.7 MMOL/L (ref 3.5–5.2)
PROT SERPL-MCNC: 7.5 G/DL (ref 6–8.5)
RBC # BLD AUTO: 3.77 10*6/MM3 (ref 4.14–5.8)
SODIUM SERPL-SCNC: 136 MMOL/L (ref 136–145)
TROPONIN T SERPL-MCNC: <0.01 NG/ML (ref 0–0.03)
WBC NRBC COR # BLD: 12.99 10*3/MM3 (ref 3.4–10.8)

## 2022-08-03 PROCEDURE — 36415 COLL VENOUS BLD VENIPUNCTURE: CPT

## 2022-08-03 PROCEDURE — 93010 ELECTROCARDIOGRAM REPORT: CPT | Performed by: INTERNAL MEDICINE

## 2022-08-03 PROCEDURE — 84484 ASSAY OF TROPONIN QUANT: CPT

## 2022-08-03 PROCEDURE — 71046 X-RAY EXAM CHEST 2 VIEWS: CPT

## 2022-08-03 PROCEDURE — 93005 ELECTROCARDIOGRAM TRACING: CPT

## 2022-08-03 PROCEDURE — 93005 ELECTROCARDIOGRAM TRACING: CPT | Performed by: EMERGENCY MEDICINE

## 2022-08-03 PROCEDURE — 85025 COMPLETE CBC W/AUTO DIFF WBC: CPT

## 2022-08-03 PROCEDURE — 85379 FIBRIN DEGRADATION QUANT: CPT | Performed by: PHYSICIAN ASSISTANT

## 2022-08-03 PROCEDURE — 80053 COMPREHEN METABOLIC PANEL: CPT

## 2022-08-03 PROCEDURE — 99284 EMERGENCY DEPT VISIT MOD MDM: CPT

## 2022-08-03 RX ORDER — ASPIRIN 325 MG
325 TABLET ORAL ONCE
Status: DISCONTINUED | OUTPATIENT
Start: 2022-08-03 | End: 2022-08-04

## 2022-08-03 RX ORDER — SODIUM CHLORIDE 0.9 % (FLUSH) 0.9 %
10 SYRINGE (ML) INJECTION AS NEEDED
Status: DISCONTINUED | OUTPATIENT
Start: 2022-08-03 | End: 2022-08-04 | Stop reason: HOSPADM

## 2022-08-04 ENCOUNTER — HOSPITAL ENCOUNTER (OUTPATIENT)
Facility: HOSPITAL | Age: 77
Discharge: HOME | End: 2022-08-04
Attending: EMERGENCY MEDICINE | Admitting: HOSPITALIST

## 2022-08-04 ENCOUNTER — ANESTHESIA (OUTPATIENT)
Dept: GASTROENTEROLOGY | Facility: HOSPITAL | Age: 77
End: 2022-08-04

## 2022-08-04 ENCOUNTER — ANESTHESIA EVENT (OUTPATIENT)
Dept: GASTROENTEROLOGY | Facility: HOSPITAL | Age: 77
End: 2022-08-04

## 2022-08-04 ENCOUNTER — APPOINTMENT (OUTPATIENT)
Dept: CT IMAGING | Facility: HOSPITAL | Age: 77
End: 2022-08-04

## 2022-08-04 ENCOUNTER — READMISSION MANAGEMENT (OUTPATIENT)
Dept: CALL CENTER | Facility: HOSPITAL | Age: 77
End: 2022-08-04

## 2022-08-04 VITALS
HEART RATE: 77 BPM | DIASTOLIC BLOOD PRESSURE: 62 MMHG | OXYGEN SATURATION: 97 % | HEIGHT: 72 IN | WEIGHT: 277.78 LBS | SYSTOLIC BLOOD PRESSURE: 127 MMHG | BODY MASS INDEX: 37.62 KG/M2 | RESPIRATION RATE: 18 BRPM | TEMPERATURE: 98.6 F

## 2022-08-04 DIAGNOSIS — R07.89 ATYPICAL CHEST PAIN: ICD-10-CM

## 2022-08-04 DIAGNOSIS — K29.00 OTHER ACUTE GASTRITIS, PRESENCE OF BLEEDING UNSPECIFIED: ICD-10-CM

## 2022-08-04 DIAGNOSIS — R22.41 LOCALIZED SWELLING OF RIGHT LOWER EXTREMITY: ICD-10-CM

## 2022-08-04 DIAGNOSIS — R79.89 ELEVATED D-DIMER: ICD-10-CM

## 2022-08-04 DIAGNOSIS — K29.00 ACUTE GASTRITIS, PRESENCE OF BLEEDING UNSPECIFIED, UNSPECIFIED GASTRITIS TYPE: Primary | ICD-10-CM

## 2022-08-04 PROBLEM — K26.9 DUODENAL ULCER WITHOUT HEMORRHAGE OR PERFORATION: Status: ACTIVE | Noted: 2022-08-04

## 2022-08-04 PROBLEM — K29.80 ACUTE DUODENITIS: Status: ACTIVE | Noted: 2022-08-04

## 2022-08-04 PROBLEM — T39.395A NSAIDS ADVERSE REACTION: Status: ACTIVE | Noted: 2022-08-04

## 2022-08-04 PROBLEM — E66.9 OBESITY (BMI 30-39.9): Status: ACTIVE | Noted: 2022-08-04

## 2022-08-04 PROBLEM — Z96.651 STATUS POST RIGHT KNEE REPLACEMENT: Status: ACTIVE | Noted: 2022-08-04

## 2022-08-04 LAB
D DIMER PPP FEU-MCNC: 2.44 MCGFEU/ML (ref 0–0.49)
HOLD SPECIMEN: NORMAL
HOLD SPECIMEN: NORMAL
LIPASE SERPL-CCNC: 20 U/L (ref 13–60)
QT INTERVAL: 382 MS
SARS-COV-2 RNA RESP QL NAA+PROBE: NOT DETECTED
TROPONIN T SERPL-MCNC: <0.01 NG/ML (ref 0–0.03)
WHOLE BLOOD HOLD COAG: NORMAL
WHOLE BLOOD HOLD SPECIMEN: NORMAL

## 2022-08-04 PROCEDURE — 96365 THER/PROPH/DIAG IV INF INIT: CPT

## 2022-08-04 PROCEDURE — 0 IOPAMIDOL PER 1 ML: Performed by: EMERGENCY MEDICINE

## 2022-08-04 PROCEDURE — 74177 CT ABD & PELVIS W/CONTRAST: CPT

## 2022-08-04 PROCEDURE — 88305 TISSUE EXAM BY PATHOLOGIST: CPT | Performed by: INTERNAL MEDICINE

## 2022-08-04 PROCEDURE — 96366 THER/PROPH/DIAG IV INF ADDON: CPT

## 2022-08-04 PROCEDURE — 71275 CT ANGIOGRAPHY CHEST: CPT

## 2022-08-04 PROCEDURE — 83690 ASSAY OF LIPASE: CPT | Performed by: PHYSICIAN ASSISTANT

## 2022-08-04 PROCEDURE — 99214 OFFICE O/P EST MOD 30 MIN: CPT | Performed by: INTERNAL MEDICINE

## 2022-08-04 PROCEDURE — 84484 ASSAY OF TROPONIN QUANT: CPT | Performed by: EMERGENCY MEDICINE

## 2022-08-04 PROCEDURE — G0378 HOSPITAL OBSERVATION PER HR: HCPCS

## 2022-08-04 PROCEDURE — 25010000002 PROPOFOL 10 MG/ML EMULSION: Performed by: NURSE ANESTHETIST, CERTIFIED REGISTERED

## 2022-08-04 PROCEDURE — U0003 INFECTIOUS AGENT DETECTION BY NUCLEIC ACID (DNA OR RNA); SEVERE ACUTE RESPIRATORY SYNDROME CORONAVIRUS 2 (SARS-COV-2) (CORONAVIRUS DISEASE [COVID-19]), AMPLIFIED PROBE TECHNIQUE, MAKING USE OF HIGH THROUGHPUT TECHNOLOGIES AS DESCRIBED BY CMS-2020-01-R: HCPCS | Performed by: PHYSICIAN ASSISTANT

## 2022-08-04 PROCEDURE — 43239 EGD BIOPSY SINGLE/MULTIPLE: CPT | Performed by: INTERNAL MEDICINE

## 2022-08-04 PROCEDURE — 96376 TX/PRO/DX INJ SAME DRUG ADON: CPT

## 2022-08-04 RX ORDER — SODIUM CHLORIDE 0.9 % (FLUSH) 0.9 %
10 SYRINGE (ML) INJECTION AS NEEDED
Status: DISCONTINUED | OUTPATIENT
Start: 2022-08-04 | End: 2022-08-04

## 2022-08-04 RX ORDER — SUCRALFATE 1 G/1
1 TABLET ORAL
Qty: 120 TABLET | Refills: 0 | Status: SHIPPED | OUTPATIENT
Start: 2022-08-04 | End: 2022-08-11 | Stop reason: SDUPTHER

## 2022-08-04 RX ORDER — PANTOPRAZOLE SODIUM 40 MG/1
40 TABLET, DELAYED RELEASE ORAL
Status: DISCONTINUED | OUTPATIENT
Start: 2022-08-04 | End: 2022-08-04 | Stop reason: HOSPADM

## 2022-08-04 RX ORDER — ASPIRIN 81 MG/1
81 TABLET ORAL 2 TIMES DAILY
COMMUNITY
End: 2022-09-15

## 2022-08-04 RX ORDER — ACETAMINOPHEN 325 MG/1
650 TABLET ORAL EVERY 4 HOURS PRN
Status: DISCONTINUED | OUTPATIENT
Start: 2022-08-04 | End: 2022-08-04 | Stop reason: HOSPADM

## 2022-08-04 RX ORDER — PANTOPRAZOLE SODIUM 40 MG/1
40 TABLET, DELAYED RELEASE ORAL
Qty: 60 TABLET | Refills: 0 | Status: SHIPPED | OUTPATIENT
Start: 2022-08-04 | End: 2022-08-11 | Stop reason: SDUPTHER

## 2022-08-04 RX ORDER — PROPOFOL 10 MG/ML
VIAL (ML) INTRAVENOUS AS NEEDED
Status: DISCONTINUED | OUTPATIENT
Start: 2022-08-04 | End: 2022-08-04 | Stop reason: SURG

## 2022-08-04 RX ORDER — LIDOCAINE HYDROCHLORIDE 20 MG/ML
INJECTION, SOLUTION INFILTRATION; PERINEURAL AS NEEDED
Status: DISCONTINUED | OUTPATIENT
Start: 2022-08-04 | End: 2022-08-04 | Stop reason: SURG

## 2022-08-04 RX ORDER — SODIUM CHLORIDE, SODIUM LACTATE, POTASSIUM CHLORIDE, CALCIUM CHLORIDE 600; 310; 30; 20 MG/100ML; MG/100ML; MG/100ML; MG/100ML
30 INJECTION, SOLUTION INTRAVENOUS CONTINUOUS
Status: DISCONTINUED | OUTPATIENT
Start: 2022-08-04 | End: 2022-08-04

## 2022-08-04 RX ORDER — SUCRALFATE 1 G/1
1 TABLET ORAL
Status: DISCONTINUED | OUTPATIENT
Start: 2022-08-04 | End: 2022-08-04 | Stop reason: HOSPADM

## 2022-08-04 RX ORDER — SODIUM CHLORIDE 9 MG/ML
30 INJECTION, SOLUTION INTRAVENOUS CONTINUOUS PRN
Status: DISCONTINUED | OUTPATIENT
Start: 2022-08-04 | End: 2022-08-04

## 2022-08-04 RX ORDER — POLYETHYLENE GLYCOL 3350 17 G/17G
17 POWDER, FOR SOLUTION ORAL 2 TIMES DAILY
Status: DISCONTINUED | OUTPATIENT
Start: 2022-08-04 | End: 2022-08-04 | Stop reason: HOSPADM

## 2022-08-04 RX ORDER — NITROGLYCERIN 0.4 MG/1
0.4 TABLET SUBLINGUAL
Status: DISCONTINUED | OUTPATIENT
Start: 2022-08-04 | End: 2022-08-04 | Stop reason: HOSPADM

## 2022-08-04 RX ORDER — SODIUM CHLORIDE 9 MG/ML
100 INJECTION, SOLUTION INTRAVENOUS CONTINUOUS
Status: DISCONTINUED | OUTPATIENT
Start: 2022-08-04 | End: 2022-08-04

## 2022-08-04 RX ORDER — PANTOPRAZOLE SODIUM 40 MG/10ML
80 INJECTION, POWDER, LYOPHILIZED, FOR SOLUTION INTRAVENOUS ONCE
Status: COMPLETED | OUTPATIENT
Start: 2022-08-04 | End: 2022-08-04

## 2022-08-04 RX ORDER — SODIUM CHLORIDE 0.9 % (FLUSH) 0.9 %
10 SYRINGE (ML) INJECTION EVERY 12 HOURS SCHEDULED
Status: DISCONTINUED | OUTPATIENT
Start: 2022-08-04 | End: 2022-08-04

## 2022-08-04 RX ORDER — ONDANSETRON 2 MG/ML
4 INJECTION INTRAMUSCULAR; INTRAVENOUS EVERY 6 HOURS PRN
Status: DISCONTINUED | OUTPATIENT
Start: 2022-08-04 | End: 2022-08-04 | Stop reason: HOSPADM

## 2022-08-04 RX ADMIN — SODIUM CHLORIDE 30 ML/HR: 9 INJECTION, SOLUTION INTRAVENOUS at 09:50

## 2022-08-04 RX ADMIN — SUCRALFATE 1 G: 1 TABLET ORAL at 17:16

## 2022-08-04 RX ADMIN — Medication 10 ML: at 09:06

## 2022-08-04 RX ADMIN — LIDOCAINE HYDROCHLORIDE 60 MG: 20 INJECTION, SOLUTION INFILTRATION; PERINEURAL at 10:53

## 2022-08-04 RX ADMIN — SODIUM CHLORIDE 100 ML/HR: 9 INJECTION, SOLUTION INTRAVENOUS at 06:57

## 2022-08-04 RX ADMIN — PROPOFOL 50 MG: 10 INJECTION, EMULSION INTRAVENOUS at 10:55

## 2022-08-04 RX ADMIN — PROPOFOL 50 MG: 10 INJECTION, EMULSION INTRAVENOUS at 10:59

## 2022-08-04 RX ADMIN — PANTOPRAZOLE SODIUM 40 MG: 40 TABLET, DELAYED RELEASE ORAL at 17:16

## 2022-08-04 RX ADMIN — PROPOFOL 150 MG: 10 INJECTION, EMULSION INTRAVENOUS at 10:53

## 2022-08-04 RX ADMIN — PANTOPRAZOLE SODIUM 8 MG/HR: 40 INJECTION, POWDER, FOR SOLUTION INTRAVENOUS at 05:19

## 2022-08-04 RX ADMIN — IOPAMIDOL 95 ML: 755 INJECTION, SOLUTION INTRAVENOUS at 03:08

## 2022-08-04 RX ADMIN — PANTOPRAZOLE SODIUM 80 MG: 40 INJECTION, POWDER, FOR SOLUTION INTRAVENOUS at 05:10

## 2022-08-04 RX ADMIN — SODIUM CHLORIDE 500 ML: 9 INJECTION, SOLUTION INTRAVENOUS at 02:14

## 2022-08-04 RX ADMIN — SODIUM CHLORIDE, POTASSIUM CHLORIDE, SODIUM LACTATE AND CALCIUM CHLORIDE: 600; 310; 30; 20 INJECTION, SOLUTION INTRAVENOUS at 10:50

## 2022-08-04 NOTE — H&P
Patient Name:  Isaac Suarez  YOB: 1945  MRN:  [id number]  Admit Date:  8/4/2022  Patient Care Team:  Guillermo Moran MD as PCP - General (Internal Medicine)  Edward Starkey MD as Surgeon (Orthopedic Surgery)  Sue Fraga DPM as Consulting Physician (Podiatry)  Adam Cunningham MD as Consulting Physician (Urology)  Irasema Loco MD as Consulting Physician (Pain Medicine)      Subjective   History Present Illness     Chief Complaint   Patient presents with   • Chest Pain   • Shortness of Breath       Mr. Suarez is a 76 y.o. male with a history of recent right knee replacement surgery, HTN, CKD, HLD and prostate cancer that presents to [facility] complaining of chest pain and shortness of breath.    Chest Pain   This is a new problem. The current episode started in the past 7 days. The onset quality is gradual. The problem occurs constantly. The problem has been unchanged. The pain is present in the substernal region. The pain is moderate. The quality of the pain is described as tightness. Associated symptoms include nausea and vomiting. Pertinent negatives include no shortness of breath. The pain is aggravated by food. He has tried nothing for the symptoms. Risk factors include alcohol intake, male gender and obesity.   His past medical history is significant for hyperlipidemia and hypertension.   Shortness of Breath  Associated symptoms include chest pain and vomiting.     Review of Systems   Constitutional: Negative.    HENT: Negative.    Eyes: Negative.    Respiratory: Positive for chest tightness. Negative for shortness of breath.    Cardiovascular: Positive for chest pain.   Gastrointestinal: Positive for constipation, nausea and vomiting. Negative for blood in stool.   Endocrine: Negative.    Genitourinary: Negative.    Musculoskeletal: Negative.    Skin: Negative.    Neurological: Negative.    Hematological: Negative.    Psychiatric/Behavioral:  Negative.         Personal History     Past Medical History:   Diagnosis Date   • Abnormal MRI, lumbar spine    • Allergic rhinitis    • Arthritis    • Bilateral hearing loss    • Borderline blood pressure    • BPH (benign prostatic hyperplasia)    • Cancer (HCC)     PROSTATE   • Chronic back pain     when walking and lying down   • Chronic kidney disease (CKD), stage I    • Cough    • Edema    • Elevated transaminase level    • Erectile dysfunction    • External hemorrhoids    • GERD (gastroesophageal reflux disease)    • High cholesterol    • History of colon polyps    • History of depression    • History of prostate cancer     RADIATION TX   • Hyperlipidemia    • Hypertension    • Left knee pain    • Leg cramps    • Low back pain    • Lumbar stenosis    • Muscle spasm    • Prostate cancer (HCC)    • Renal cyst    • RLS (restless legs syndrome)    • Statin intolerance    • Vitamin D deficiency      Past Surgical History:   Procedure Laterality Date   • CATARACT EXTRACTION Right    • COLONOSCOPY N/A 08/15/2016    Procedure: COLONOSCOPY INTO CECUM WITH COLD SNARE POLYPECTOMY;  Surgeon: Loc Lee MD;  Location: [facility] ENDOSCOPY;  Service:    • HERNIA REPAIR      bilat inguinal   • INSERTION PROSTATE RADIATION SEED     • JOINT REPLACEMENT Left 01/05/2022    left knee replacement   • LUMBAR DISCECTOMY FUSION INSTRUMENTATION N/A 09/23/2016    Procedure: L 4-5 LUMBAR DISCECTOMY FUSION WITH INSTRUMENTATION;  Surgeon: Michael Frias MD;  Location: [facility] MAIN OR;  Service:    • LUMBAR EPIDURAL INJECTION N/A 08/06/2021    Procedure: LUMBAR EPIDURAL 1ST VISIT 3-4;  Surgeon: Irasema Loco MD;  [place] [facility] [place]  Service: Pain Management;  Laterality: N/A;   • LUMBAR EPIDURAL INJECTION N/A 09/10/2021    Procedure: lumbar epidural steroid injection lumbar2-3;  Surgeon: Irasema Loco MD;  [place] [facility] [place];  Service: Pain Management;  Laterality: N/A;   • PROSTATE BIOPSY      Needle biopsy   •  ROTATOR CUFF REPAIR Bilateral     metal in both   • SACROILIAC JOINT INJECTION Left 2021    Procedure: SACROILIAC INJECTION-- left;  Surgeon: Irasema Loco MD;  Location: [facility] MAIN OR;  Service: Pain Management;  Laterality: Left;   • SACROILIAC JOINT INJECTION Left 2022    Procedure: SACROILIAC INJECTION--left;  Surgeon: Irasema Loco MD;  Location: [facility] MAIN OR;  Service: Pain Management;  Laterality: Left;   • TOTAL SHOULDER ARTHROPLASTY Bilateral    • VASECTOMY       Family History   Problem Relation Age of Onset   • Cancer Father         BRAIN   • Esophageal cancer Other      Social History     Tobacco Use   • Smoking status: Former Smoker     Packs/day: 2.00     Years: 12.00     Pack years: 24.00     Types: Cigarettes     Quit date:      Years since quittin.6   • Smokeless tobacco: Never Used   Vaping Use   • Vaping Use: Never used   Substance Use Topics   • Alcohol use: Yes     Alcohol/week: 2.0 standard drinks     Types: 2 Standard drinks or equivalent per week     Comment: SOCIALLY-Drinks 7 or less drinks per week   • Drug use: No     No current facility-administered medications on file prior to encounter.     Current Outpatient Medications on File Prior to Encounter   Medication Sig Dispense Refill   • aspirin 81 MG EC tablet Take 81 mg by mouth 2 (Two) Times a Day.     • furosemide (LASIX) 20 MG tablet Take 1 tablet by mouth Every Morning. 90 tablet 1   • levocetirizine (XYZAL) 5 MG tablet Take 5 mg by mouth Every Evening.     • lisinopril (PRINIVIL,ZESTRIL) 10 MG tablet Take 1 tablet by mouth Every Morning. 90 tablet 1   • Lumigan 0.01 % ophthalmic drops      • pregabalin (LYRICA) 150 MG capsule Take 1 capsule by mouth 2 (Two) Times a Day. 180 capsule 1   • Repatha solution prefilled syringe injection INJECT 1 ML UNDER THE SKIN INTO THE APPROPRIATE AREA AS DIRECTED EVERY 14 DAYS 2 mL 5   • [DISCONTINUED] apixaban (ELIQUIS) 2.5 MG tablet tablet Take 1 tablet by mouth 2 (Two)  Times a Day.     • [DISCONTINUED] mupirocin (BACTROBAN) 2 % ointment mupirocin 2 % topical ointment   APPLY A SMALL AMOUNT TO THE NARES BY TOPICAL ROUTE 2 TIMES PER DAY FOR 5 DAYS PRIOR TO SURGERY       Allergies   Allergen Reactions   • Crestor [Rosuvastatin] Myalgia   • Gabapentin Other (See Comments)     nightmares   • Penicillins Hives     HIVES   • Zetia [Ezetimibe] Myalgia   • Zocor [Simvastatin] Myalgia   • Hydrocodone-Acetaminophen Other (See Comments)     Causes nausea and vomiting, decreased appetite, couldn't stand the smell of food       Objective    Objective     Vital Signs  Temp:  [97.8 °F (36.6 °C)-98.5 °F (36.9 °C)] 98.5 °F (36.9 °C)  Heart Rate:  [70-84] 70  Resp:  [18] 18  BP: (120-145)/(74-90) 135/90  SpO2:  [95 %-99 %] 98 %  on   ;   Device (Oxygen Therapy): room air  Body mass index is 37.67 kg/m².    Physical Exam  Vitals reviewed.   Constitutional:       Appearance: Normal appearance. He is well-developed.   HENT:      Head: Normocephalic and atraumatic.   Eyes:      General: No scleral icterus.     Conjunctiva/sclera: Conjunctivae normal.   Neck:      Vascular: No JVD.   Cardiovascular:      Rate and Rhythm: Normal rate and regular rhythm.      Heart sounds: No murmur heard.  Pulmonary:      Effort: Pulmonary effort is normal. No respiratory distress.      Breath sounds: Normal breath sounds.   Abdominal:      General: Bowel sounds are normal. There is no distension.      Palpations: Abdomen is soft.      Tenderness: There is no abdominal tenderness.   Musculoskeletal:      Cervical back: Normal range of motion and neck supple.      Comments: Right knee incision dressed with bandage CDI, minimal swelling   Skin:     General: Skin is warm and dry.   Neurological:      Mental Status: He is alert and oriented to person, place, and time.      Cranial Nerves: No cranial nerve deficit.   Psychiatric:         Behavior: Behavior normal.         Results Review:  I reviewed the patient's new clinical  results.  I reviewed the patient's new imaging results and agree with the interpretation.  I reviewed the patient's other test results and agree with the interpretation  I personally viewed and interpreted the patient's EKG/Telemetry data  Discussed with ED provider.    Lab Results (last 24 hours)     Procedure Component Value Units Date/Time    CBC & Differential [[id number]]  (Abnormal) Collected: 08/03/22 2304    Specimen: Blood Updated: 08/03/22 2328    Narrative:      The following orders were created for panel order CBC & Differential.  Procedure                               Abnormality         Status                     ---------                               -----------         ------                     CBC Auto Differential[[id number]]        Abnormal            Final result                 Please view results for these tests on the individual orders.    Comprehensive Metabolic Panel [[id number]]  (Abnormal) Collected: 08/03/22 2304    Specimen: Blood Updated: 08/03/22 2354     Glucose 113 mg/dL      BUN 31 mg/dL      Creatinine 1.42 mg/dL      Sodium 136 mmol/L      Potassium 4.7 mmol/L      Chloride 99 mmol/L      CO2 21.6 mmol/L      Calcium 9.9 mg/dL      Total Protein 7.5 g/dL      Albumin 4.60 g/dL      ALT (SGPT) 15 U/L      AST (SGOT) 21 U/L      Alkaline Phosphatase 75 U/L      Total Bilirubin 0.7 mg/dL      Globulin 2.9 gm/dL      A/G Ratio 1.6 g/dL      BUN/Creatinine Ratio 21.8     Anion Gap 15.4 mmol/L      eGFR 51.5 mL/min/1.73      Comment: National Kidney Foundation and American Society of Nephrology (ASN) Task Force recommended calculation based on the Chronic Kidney Disease Epidemiology Collaboration (CKD-EPI) equation refit without adjustment for race.       Narrative:      GFR Normal >60  Chronic Kidney Disease <60  Kidney Failure <15      Troponin [[id number]]  (Normal) Collected: 08/03/22 2304    Specimen: Blood Updated: 08/03/22 2354     Troponin T <0.010 ng/mL     Narrative:       Troponin T Reference Range:  <= 0.03 ng/mL-   Negative for AMI  >0.03 ng/mL-     Abnormal for myocardial necrosis.  Clinicians would have to utilize clinical acumen, EKG, Troponin and serial changes to determine if it is an Acute Myocardial Infarction or myocardial injury due to an underlying chronic condition.       Results may be falsely decreased if patient taking Biotin.      CBC Auto Differential [[id number]]  (Abnormal) Collected: 08/03/22 2304    Specimen: Blood Updated: 08/03/22 2328     WBC 12.99 10*3/mm3      RBC 3.77 10*6/mm3      Hemoglobin 11.9 g/dL      Hematocrit 33.9 %      MCV 89.9 fL      MCH 31.6 pg      MCHC 35.1 g/dL      RDW 13.1 %      RDW-SD 42.3 fl      MPV 8.7 fL      Platelets 353 10*3/mm3      Neutrophil % 78.8 %      Lymphocyte % 8.5 %      Monocyte % 5.7 %      Eosinophil % 2.5 %      Basophil % 0.3 %      Immature Grans % 4.2 %      Neutrophils, Absolute 10.24 10*3/mm3      Lymphocytes, Absolute 1.11 10*3/mm3      Monocytes, Absolute 0.74 10*3/mm3      Eosinophils, Absolute 0.32 10*3/mm3      Basophils, Absolute 0.04 10*3/mm3      Immature Grans, Absolute 0.54 10*3/mm3      nRBC 0.1 /100 WBC     D-dimer, Quantitative [[id number]]  (Abnormal) Collected: 08/03/22 2304    Specimen: Blood Updated: 08/04/22 0308     D-Dimer, Quantitative 2.44 MCGFEU/mL     Narrative:      The Stago D-Dimer test used in conjunction with a clinical pretest probability (PTP) assessment model, has been approved by the FDA to rule out the presence of venous thromboembolism (VTE) in outpatients suspected of deep venous thrombosis (DVT) or pulmonary embolism (PE). The cut-off for negative predictive value is <0.50 MCGFEU/mL.    Troponin [[id number]]  (Normal) Collected: 08/04/22 0212    Specimen: Blood from Arm, Left Updated: 08/04/22 0247     Troponin T <0.010 ng/mL     Narrative:      Troponin T Reference Range:  <= 0.03 ng/mL-   Negative for AMI  >0.03 ng/mL-     Abnormal for myocardial necrosis.  Clinicians would  have to utilize clinical acumen, EKG, Troponin and serial changes to determine if it is an Acute Myocardial Infarction or myocardial injury due to an underlying chronic condition.       Results may be falsely decreased if patient taking Biotin.      Lipase [[id number]]  (Normal) Collected: 08/04/22 0212    Specimen: Blood from Arm, Left Updated: 08/04/22 0244     Lipase 20 U/L     COVID PRE-OP / PRE-PROCEDURE SCREENING ORDER (NO ISOLATION) - Swab, Nasopharynx [[id number]]  (Normal) Collected: 08/04/22 0523    Specimen: Swab from Nasopharynx Updated: 08/04/22 0623    Narrative:      The following orders were created for panel order COVID PRE-OP / PRE-PROCEDURE SCREENING ORDER (NO ISOLATION) - Swab, Nasopharynx.  Procedure                               Abnormality         Status                     ---------                               -----------         ------                     COVID-19,[facility]-HOUSE...[[id number]]  Normal              Final result                 Please view results for these tests on the individual orders.    COVID-19,[facility] IN-HOUSE CEPHEID/NICK NP SWAB IN TRANSPORT MEDIA 8-12 HR TAT - Swab, Nasopharynx [[id number]]  (Normal) Collected: 08/04/22 0523    Specimen: Swab from Nasopharynx Updated: 08/04/22 0623     COVID19 Not Detected    Narrative:      Fact sheet for providers: https://www.fda.gov/media/742471/download     Fact sheet for patients: https://www.fda.gov/media/320545/download          Imaging Results (Last 24 Hours)     Procedure Component Value Units Date/Time    CT Abdomen Pelvis With Contrast [[id number]] Collected: 08/04/22 0439     Updated: 08/04/22 0442    Addenda:        ADDENDUM:  08 04 22 04:41 Call Doctor Regarding Active Bleeding in any site, called   Dr. Zaidi on 08 04 04:41 (-04:00)      Signed: 08/04/22 0438 by Kimo Weldon MD    Narrative:      CR  Patient: ZOEY KRISHNAN  Time Out: 04:38  Exam(s): CT ABDOMEN + PELVIS With Contrast     EXAM:    CT Abdomen and  Pelvis With Intravenous Contrast    CLINICAL HISTORY:     Reason for exam: Patient is right knee postop. He is complaining of   chest discomfort, shortness of breath, nausea, vomiting, left lower   quadrant pain. Rule out intra-abdominal pathology.    TECHNIQUE:    Axial computed tomography images of the abdomen and pelvis with   intravenous contrast.  CTDI is 30.4 mGy and DLP is 1750.0 mGy-cm.  This   CT exam was performed according to the principle of ALARA (As Low As   Reasonably Achievable) by using one or more of the following dose   reduction techniques: automated exposure control, adjustment of the mA   and or kV according to patient size, and or use of iterative   reconstruction technique.    COMPARISON:    None    FINDINGS:    Lung bases:  Unremarkable.  No mass.  No consolidation.     ABDOMEN:    Liver:  Unremarkable.  No mass.    Gallbladder and bile ducts:  Unremarkable.  No calcified stones.  No   ductal dilation.    Pancreas:  Unremarkable.  No mass.  No ductal dilation.    Spleen:  Unremarkable.  No splenomegaly.    Adrenals:  Unremarkable.  No mass.    Kidneys and ureters:  Bilateral renal cysts.  No hydronephrosis or   obstructing stone.    Stomach and bowel:  Wall thickening of the gastric antrum, duodenal   bulb, and first and second portions of the duodenum, concerning for   gastritis and duodenitis.  Small 6 mm focus of hyperdensity in the   duodenal bulb, concerning for focus of bleeding.  Evaluation of the   stomach is limited by underdistention.  Diverticulosis without evidence   of diverticulitis.  No small bowel obstruction.     PELVIS:    Appendix:  Normal appendix.    Bladder:  Unremarkable.  No mass.    Reproductive:  Unremarkable as visualized.     ABDOMEN and PELVIS:    Intraperitoneal space:  Unremarkable.  No free air.  No significant   fluid collection.    Bones joints:  Fixation of the right SI joint.  Post surgical changes   at L4-5.  Grade 1 anterolisthesis of L4 on L5.   Degenerative changes of   the spine.  No acute fracture.  No dislocation.    Soft tissues:  Small fat-containing umbilical hernia.    Vasculature:  Atherosclerotic changes of the vasculature.  No aortic   aneurysm or dissection.    Lymph nodes:  Unremarkable.  No enlarged lymph nodes.    IMPRESSION:         Wall thickening of the gastric antrum, duodenal bulb, and first and   second portions of the duodenum, concerning for gastritis and duodenitis.    Small 6 mm focus of hyperdensity in the duodenal bulb, concerning for   focus of bleeding.      Impression:            Communications:     Call Doctor Active Bleeding in any site    Electronically signed by Kimo Weldon M.D. on 08-04-22 at 0438    CT Angiogram Chest [[id number]] Collected: 08/04/22 0434     Updated: 08/04/22 0434    Narrative:        Patient: ZOEY KRISHNAN  Time Out: 04:33  Exam(s): CTA CHEST     EXAM:    CT Angiography Chest With Intravenous Contrast    CLINICAL HISTORY:     Reason for exam: Postoperative right knee replacement. Shortness of   breath and chest discomfort. Rule out PE.    TECHNIQUE:    Axial computed tomographic angiography images of the chest with   intravenous contrast.  CTDI is 14.8 mGy and DLP is 590.1 mGy-cm.  This CT   exam was performed according to the principle of ALARA (As Low As   Reasonably Achievable) by using one or more of the following dose   reduction techniques: automated exposure control, adjustment of the mA   and or kV according to patient size, and or use of iterative   reconstruction technique.    MIP reconstructed images were created and reviewed.    COMPARISON:    None    FINDINGS:    Pulmonary arteries:  No pulmonary embolus identified.    Aorta:  No aortic aneurysm or dissection.    Lungs:  Stable calcified granuloma in the left lower lobe.  No mass.    Pleural space:  Unremarkable.  No significant effusion.  No   pneumothorax.    Heart:  Unremarkable.  No cardiomegaly.  No significant pericardial    effusion.  No evidence of RV dysfunction.    Bones joints:  Bilateral shoulder arthroplasties.  Degenerative changes   of the spine.  No acute fracture.  No dislocation.    Soft tissues:  Unremarkable.    Lymph nodes: Small calcified mediastinal and left hilar lymph nodes.    No enlarged lymph nodes.    IMPRESSION:       1.  No pulmonary embolus identified.  2.  No aortic aneurysm or dissection.  3.  No acute pulmonary parenchymal abnormality identified.    Impression:          Electronically signed by Kimo Weldon M.D. on 08-04-22 at 0433    XR Chest 2 View [[id number]] Collected: 08/03/22 2205     Updated: 08/03/22 2208    Narrative:      TWO-VIEW CHEST     HISTORY: Chest pain. Recent knee surgery.     FINDINGS: The lungs are moderately well-expanded and clear and the heart  size is normal. There is no acute disease or change from 12/20/2021.     This report was finalized on 8/3/2022 10:05 PM by Dr. Gurvinder Yeung M.D.               ECG 12 Lead   Preliminary Result   HEART RATE= 76  bpm   RR Interval= 789  ms   SC Interval= 198  ms   P Horizontal Axis= 10  deg   P Front Axis= 30  deg   QRSD Interval= 108  ms   QT Interval= 382  ms   QRS Axis= 62  deg   T Wave Axis= 29  deg   - NORMAL ECG -   Sinus rhythm   Baseline wander in lead(s) V5   Electronically Signed By:    Date and Time of Study: 2022-08-03 21:05:36        Assessment/Plan   Assessment & Plan   Active Hospital Problems    Diagnosis  POA   • **Acute gastritis [K29.00]  Yes   • Obesity (BMI 30-39.9) [E66.9]  Yes   • Acute duodenitis [K29.80]  Yes   • Status post right knee replacement [Z96.651]  Not Applicable   • Stage 3a chronic kidney disease (HCC) [N18.31]  Yes   • Hypertension [I10]  Yes      Resolved Hospital Problems   No resolved problems to display.       76 y.o. male admitted with Acute gastritis.    CTA of the chest in the emergency department was negative for pulmonary embolus.  Elevated D-dimer is likely due to his postoperative state.  With  negative CTA chest likelihood of lower extremity DVT very low.  No significant swelling or discomfort in his leg.    CT abdomen demonstrated gastric wall thickening possible gastritis and duodenitis.  Possible bleeding in the duodenal bulb.  He has been seen by gastroenterology who plans EGD this afternoon.  Currently on Protonix drip.  No signs of bleeding though will monitor hemoglobin closely for now.      SCDs for DVT prophylaxis.  Full code.  Discussed with patient, nursing staff and ED provider.      Ja Vega MD  [facility]  08/04/22  09:12 EDT

## 2022-08-04 NOTE — ED TRIAGE NOTES
Pt to ER from home via PV with c/o chest pain and shortness of air since this morning. Pt states he feels unable to get a deep breath and that tightness/pain is midsternal. Pt recently had knee surgery and family also voices concerns for dvt. Pt arrives with walker to wheelchair.       Pt masked in triage, staff in appropriate ppe.

## 2022-08-04 NOTE — ED PROVIDER NOTES
EMERGENCY DEPARTMENT ENCOUNTER    Room Number:  08/08  Date of encounter:  8/4/2022  PCP: Guillermo Moran MD  Historian: Patient      HPI:  Chief Complaint: Chest pain and shortness of breath  A complete HPI/ROS/PMH/PSH/SH/FH are unobtainable due to: Nothing    Context: Isaac Suarez is a 76 y.o. male who presents to the ED c/o chest pain and shortness of breath.  Per the patient he is approximately 1 week postop right total knee.  The knee arthroplasty was performed at a different facility.  He states earlier yesterday he began to develop chest pain and shortness of breath.  He states the pain is slightly worse with deep breathing.  He also is complaining of left lower quadrant abdominal pain.  Pain at present said to be mild to moderate.  He had been told by his surgeon should he develop chest pain or shortness of breath he should immediately report to the emergency department.  Patient is not taking anticoagulants.  He is here for further evaluation.      PAST MEDICAL HISTORY  Active Ambulatory Problems     Diagnosis Date Noted   • Spinal stenosis of lumbar region with neurogenic claudication 07/26/2016   • Chronic midline low back pain without sciatica 04/20/2017   • Bilateral sacroiliitis (HCC) 11/21/2017   • Hypertension 08/20/2019   • Hyperlipidemia 08/20/2019   • Leg cramps 08/20/2019   • Elevated transaminase level 08/20/2019   • Medicare annual wellness visit, subsequent 11/26/2019   • Encounter for hepatitis C screening test for low risk patient 11/26/2019   • Vitamin D deficiency 11/26/2019   • Muscle spasm 11/26/2019   • RLS (restless legs syndrome)    • S/P lumbar spinal fusion 06/01/2018   • S/P shoulder replacement 12/02/2014   • Idiopathic peripheral neuropathy 07/16/2020   • Other chronic pain 09/01/2021   • Osteoarthritis of lumbar spine 09/01/2021   • Lumbar radiculopathy 09/01/2021   • Right hip pain 06/10/2022   • Elevated serum creatinine 06/10/2022   • High risk medication use  06/10/2022   • Primary osteoarthritis of left knee 11/19/2021   • Primary osteoarthritis of right hip 07/05/2022   • Stage 3a chronic kidney disease (HCC)      Resolved Ambulatory Problems     Diagnosis Date Noted   • Spondylolisthesis of lumbar region 09/06/2016   • Surgery follow-up examination 10/10/2016     Past Medical History:   Diagnosis Date   • Abnormal MRI, lumbar spine    • Allergic rhinitis    • Arthritis    • Bilateral hearing loss    • Borderline blood pressure    • BPH (benign prostatic hyperplasia)    • Cancer (HCC)    • Chronic back pain    • Chronic kidney disease (CKD), stage I    • Cough    • Edema    • Erectile dysfunction    • External hemorrhoids    • GERD (gastroesophageal reflux disease)    • High cholesterol    • History of colon polyps    • History of depression    • History of prostate cancer    • Left knee pain    • Low back pain    • Lumbar stenosis    • Prostate cancer (HCC)    • Renal cyst    • Statin intolerance          PAST SURGICAL HISTORY  Past Surgical History:   Procedure Laterality Date   • CATARACT EXTRACTION Right    • COLONOSCOPY N/A 08/15/2016    Procedure: COLONOSCOPY INTO CECUM WITH COLD SNARE POLYPECTOMY;  Surgeon: Loc Lee MD;  Location: [facility] ENDOSCOPY;  Service:    • HERNIA REPAIR      bilat inguinal   • INSERTION PROSTATE RADIATION SEED     • JOINT REPLACEMENT Left 01/05/2022    left knee replacement   • LUMBAR DISCECTOMY FUSION INSTRUMENTATION N/A 09/23/2016    Procedure: L 4-5 LUMBAR DISCECTOMY FUSION WITH INSTRUMENTATION;  Surgeon: Michael Frias MD;  Location: [facility] MAIN OR;  Service:    • LUMBAR EPIDURAL INJECTION N/A 08/06/2021    Procedure: LUMBAR EPIDURAL 1ST VISIT 3-4;  Surgeon: rIasema Loco MD;  Location: [facility] MAIN OR;  Service: Pain Management;  Laterality: N/A;   • LUMBAR EPIDURAL INJECTION N/A 09/10/2021    Procedure: lumbar epidural steroid injection lumbar2-3;  Surgeon: Irasema Loco MD;  Location: [facility] MAIN OR;  Service: Pain  Management;  Laterality: N/A;   • PROSTATE BIOPSY      Needle biopsy   • ROTATOR CUFF REPAIR Bilateral     metal in both   • SACROILIAC JOINT INJECTION Left 2021    Procedure: SACROILIAC INJECTION-- left;  Surgeon: Irasema Loco MD;  [place] [facility] [place];  Service: Pain Management;  Laterality: Left;   • SACROILIAC JOINT INJECTION Left 2022    Procedure: SACROILIAC INJECTION--left;  Surgeon: Irasema Loco MD;  [place] [facility] [place] [place];  Service: Pain Management;  Laterality: Left;   • TOTAL SHOULDER ARTHROPLASTY Bilateral    • VASECTOMY           FAMILY HISTORY  Family History   Problem Relation Age of Onset   • Cancer Father         BRAIN   • Esophageal cancer Other          SOCIAL HISTORY  Social History     Socioeconomic History   • Marital status:    • Years of education: 12TH GRADE   Tobacco Use   • Smoking status: Former Smoker     Packs/day: 2.00     Years: 12.00     Pack years: 24.00     Types: Cigarettes     Quit date:      Years since quittin.6   • Smokeless tobacco: Never Used   Vaping Use   • Vaping Use: Never used   Substance and Sexual Activity   • Alcohol use: Yes     Alcohol/week: 2.0 standard drinks     Types: 2 Standard drinks or equivalent per week     Comment: SOCIALLY-Drinks 7 or less drinks per week   • Drug use: No   • Sexual activity: Yes     Partners: Female     Birth control/protection: None         ALLERGIES  Crestor [rosuvastatin], Gabapentin, Penicillins, Zetia [ezetimibe], Zocor [simvastatin], and Hydrocodone-acetaminophen        REVIEW OF SYSTEMS  Review of Systems   Constitutional: Negative for chills and fever.   HENT: Negative.    Eyes: Negative.    Respiratory: Positive for cough and shortness of breath.    Cardiovascular: Negative for chest pain and palpitations.   Gastrointestinal: Positive for abdominal pain.   Genitourinary: Negative.    Musculoskeletal: Negative.    Skin: Negative.    Neurological: Negative.    Psychiatric/Behavioral:  Negative.         All systems reviewed and negative except for those discussed in HPI.       PHYSICAL EXAM    I have reviewed the triage vital signs and nursing notes.    ED Triage Vitals [08/03/22 2102]   Temp Heart Rate Resp BP SpO2   97.8 °F (36.6 °C) 84 18 138/85 98 %      Temp src Heart Rate Source Patient Position BP Location FiO2 (%)   -- -- -- -- --       Physical Exam  GENERAL: Very pleasant, nontoxic, does appear slightly comfortable  HENT: nares patent  EYES: no scleral icterus  CV: regular rhythm, regular rate, normal S1-S2.  There is some slight unilateral leg swelling on the right below the knee when compared to the left.  RESPIRATORY: normal effort, slightly tachypneic, SPO2 95 to 96% at rest on RA  ABDOMEN: Mild TTP in the left lower quadrant, no guarding, no rebound, bowel sounds unremarkable.  MUSCULOSKELETAL: no deformity  NEURO: alert, moves all extremities, follows commands  SKIN: Incision site overlying the right knee looks well approximated.  There is no discharge there is no erythema or signs of infection.        LAB RESULTS  Recent Results (from the past 24 hour(s))   ECG 12 Lead    Collection Time: 08/03/22  9:05 PM   Result Value Ref Range    QT Interval 382 ms   Comprehensive Metabolic Panel    Collection Time: 08/03/22 11:04 PM    Specimen: Blood   Result Value Ref Range    Glucose 113 (H) 65 - 99 mg/dL    BUN 31 (H) 8 - 23 mg/dL    Creatinine 1.42 (H) 0.76 - 1.27 mg/dL    Sodium 136 136 - 145 mmol/L    Potassium 4.7 3.5 - 5.2 mmol/L    Chloride 99 98 - 107 mmol/L    CO2 21.6 (L) 22.0 - 29.0 mmol/L    Calcium 9.9 8.6 - 10.5 mg/dL    Total Protein 7.5 6.0 - 8.5 g/dL    Albumin 4.60 3.50 - 5.20 g/dL    ALT (SGPT) 15 1 - 41 U/L    AST (SGOT) 21 1 - 40 U/L    Alkaline Phosphatase 75 39 - 117 U/L    Total Bilirubin 0.7 0.0 - 1.2 mg/dL    Globulin 2.9 gm/dL    A/G Ratio 1.6 g/dL    BUN/Creatinine Ratio 21.8 7.0 - 25.0    Anion Gap 15.4 (H) 5.0 - 15.0 mmol/L    eGFR 51.5 (L) >60.0 mL/min/1.73    Troponin    Collection Time: 08/03/22 11:04 PM    Specimen: Blood   Result Value Ref Range    Troponin T <0.010 0.000 - 0.030 ng/mL   Green Top (Gel)    Collection Time: 08/03/22 11:04 PM   Result Value Ref Range    Extra Tube Hold for add-ons.    Lavender Top    Collection Time: 08/03/22 11:04 PM   Result Value Ref Range    Extra Tube hold for add-on    Gold Top - SST    Collection Time: 08/03/22 11:04 PM   Result Value Ref Range    Extra Tube Hold for add-ons.    Light Blue Top    Collection Time: 08/03/22 11:04 PM   Result Value Ref Range    Extra Tube Hold for add-ons.    CBC Auto Differential    Collection Time: 08/03/22 11:04 PM    Specimen: Blood   Result Value Ref Range    WBC 12.99 (H) 3.40 - 10.80 10*3/mm3    RBC 3.77 (L) 4.14 - 5.80 10*6/mm3    Hemoglobin 11.9 (L) 13.0 - 17.7 g/dL    Hematocrit 33.9 (L) 37.5 - 51.0 %    MCV 89.9 79.0 - 97.0 fL    MCH 31.6 26.6 - 33.0 pg    MCHC 35.1 31.5 - 35.7 g/dL    RDW 13.1 12.3 - 15.4 %    RDW-SD 42.3 37.0 - 54.0 fl    MPV 8.7 6.0 - 12.0 fL    Platelets 353 140 - 450 10*3/mm3    Neutrophil % 78.8 (H) 42.7 - 76.0 %    Lymphocyte % 8.5 (L) 19.6 - 45.3 %    Monocyte % 5.7 5.0 - 12.0 %    Eosinophil % 2.5 0.3 - 6.2 %    Basophil % 0.3 0.0 - 1.5 %    Immature Grans % 4.2 (H) 0.0 - 0.5 %    Neutrophils, Absolute 10.24 (H) 1.70 - 7.00 10*3/mm3    Lymphocytes, Absolute 1.11 0.70 - 3.10 10*3/mm3    Monocytes, Absolute 0.74 0.10 - 0.90 10*3/mm3    Eosinophils, Absolute 0.32 0.00 - 0.40 10*3/mm3    Basophils, Absolute 0.04 0.00 - 0.20 10*3/mm3    Immature Grans, Absolute 0.54 (H) 0.00 - 0.05 10*3/mm3    nRBC 0.1 0.0 - 0.2 /100 WBC   D-dimer, Quantitative    Collection Time: 08/03/22 11:04 PM    Specimen: Blood   Result Value Ref Range    D-Dimer, Quantitative 2.44 (H) 0.00 - 0.49 MCGFEU/mL   Troponin    Collection Time: 08/04/22  2:12 AM    Specimen: Arm, Left; Blood   Result Value Ref Range    Troponin T <0.010 0.000 - 0.030 ng/mL   Lipase    Collection Time: 08/04/22  2:12  AM    Specimen: Arm, Left; Blood   Result Value Ref Range    Lipase 20 13 - 60 U/L       Ordered the above labs and independently reviewed the results.        RADIOLOGY  XR Chest 2 View    Result Date: 8/3/2022  TWO-VIEW CHEST  HISTORY: Chest pain. Recent knee surgery.  FINDINGS: The lungs are moderately well-expanded and clear and the heart size is normal. There is no acute disease or change from 12/20/2021.  This report was finalized on 8/3/2022 10:05 PM by Dr. Gurvinder Yeung M.D.      CT Abdomen Pelvis With Contrast    Addendum Date: 8/4/2022    ADDENDUM: 08 04 22 04:41 Call Doctor Regarding Active Bleeding in any site, called Dr. Zaidi on 08 04 04:41 (-04:00)     Result Date: 8/4/2022  CR Patient: ZOEY KRISHNAN  Time Out: 04:38 Exam(s): CT ABDOMEN + PELVIS With Contrast EXAM:   CT Abdomen and Pelvis With Intravenous Contrast CLINICAL HISTORY:    Reason for exam: Patient is right knee postop. He is complaining of chest discomfort, shortness of breath, nausea, vomiting, left lower quadrant pain. Rule out intra-abdominal pathology. TECHNIQUE:   Axial computed tomography images of the abdomen and pelvis with intravenous contrast.  CTDI is 30.4 mGy and DLP is 1750.0 mGy-cm.  This CT exam was performed according to the principle of ALARA (As Low As Reasonably Achievable) by using one or more of the following dose reduction techniques: automated exposure control, adjustment of the mA and or kV according to patient size, and or use of iterative reconstruction technique. COMPARISON:   None FINDINGS:   Lung bases:  Unremarkable.  No mass.  No consolidation.  ABDOMEN:   Liver:  Unremarkable.  No mass.   Gallbladder and bile ducts:  Unremarkable.  No calcified stones.  No ductal dilation.   Pancreas:  Unremarkable.  No mass.  No ductal dilation.   Spleen:  Unremarkable.  No splenomegaly.   Adrenals:  Unremarkable.  No mass.   Kidneys and ureters:  Bilateral renal cysts.  No hydronephrosis or obstructing stone.   Stomach  and bowel:  Wall thickening of the gastric antrum, duodenal bulb, and first and second portions of the duodenum, concerning for gastritis and duodenitis.  Small 6 mm focus of hyperdensity in the duodenal bulb, concerning for focus of bleeding.  Evaluation of the stomach is limited by underdistention.  Diverticulosis without evidence of diverticulitis.  No small bowel obstruction.  PELVIS:   Appendix:  Normal appendix.   Bladder:  Unremarkable.  No mass.   Reproductive:  Unremarkable as visualized.  ABDOMEN and PELVIS:   Intraperitoneal space:  Unremarkable.  No free air.  No significant fluid collection.   Bones joints:  Fixation of the right SI joint.  Post surgical changes at L4-5.  Grade 1 anterolisthesis of L4 on L5.  Degenerative changes of the spine.  No acute fracture.  No dislocation.   Soft tissues:  Small fat-containing umbilical hernia.   Vasculature:  Atherosclerotic changes of the vasculature.  No aortic aneurysm or dissection.   Lymph nodes:  Unremarkable.  No enlarged lymph nodes. IMPRESSION:       Wall thickening of the gastric antrum, duodenal bulb, and first and second portions of the duodenum, concerning for gastritis and duodenitis.  Small 6 mm focus of hyperdensity in the duodenal bulb, concerning for focus of bleeding.     Communications:  Call Doctor Active Bleeding in any site Electronically signed by Kimo Weldon M.D. on 08-04-22 at 0438    CT Angiogram Chest    Result Date: 8/4/2022  Patient: ZOEY KRISHNAN  Time Out: 04:33 Exam(s): CTA CHEST EXAM:   CT Angiography Chest With Intravenous Contrast CLINICAL HISTORY:    Reason for exam: Postoperative right knee replacement. Shortness of breath and chest discomfort. Rule out PE. TECHNIQUE:   Axial computed tomographic angiography images of the chest with intravenous contrast.  CTDI is 14.8 mGy and DLP is 590.1 mGy-cm.  This CT exam was performed according to the principle of ALARA (As Low As Reasonably Achievable) by using one or more of the  following dose reduction techniques: automated exposure control, adjustment of the mA and or kV according to patient size, and or use of iterative reconstruction technique.   MIP reconstructed images were created and reviewed. COMPARISON:   None FINDINGS:   Pulmonary arteries:  No pulmonary embolus identified.   Aorta:  No aortic aneurysm or dissection.   Lungs:  Stable calcified granuloma in the left lower lobe.  No mass.   Pleural space:  Unremarkable.  No significant effusion.  No pneumothorax.   Heart:  Unremarkable.  No cardiomegaly.  No significant pericardial effusion.  No evidence of RV dysfunction.   Bones joints:  Bilateral shoulder arthroplasties.  Degenerative changes of the spine.  No acute fracture.  No dislocation.   Soft tissues:  Unremarkable.   Lymph nodes: Small calcified mediastinal and left hilar lymph nodes.  No enlarged lymph nodes. IMPRESSION:     1.  No pulmonary embolus identified. 2.  No aortic aneurysm or dissection. 3.  No acute pulmonary parenchymal abnormality identified.    Electronically signed by Kimo Weldon M.D. on 08-04-22 at 0433      I ordered the above noted radiological studies. Reviewed by me and discussed with radiologist.  See dictation for official radiology interpretation.      PROCEDURES    Procedures      MEDICATIONS GIVEN IN ER    Medications   sodium chloride 0.9 % flush 10 mL (has no administration in time range)   aspirin tablet 325 mg (has no administration in time range)   pantoprazole (PROTONIX) 40 mg in 100 mL NS (VTB) (8 mg/hr Intravenous New Bag 8/4/22 0519)   sodium chloride 0.9 % bolus 500 mL (0 mL Intravenous Stopped 8/4/22 2375)   iopamidol (ISOVUE-370) 76 % injection 100 mL (95 mL Intravenous Given 8/4/22 0308)   pantoprazole (PROTONIX) injection 80 mg (80 mg Intravenous Given 8/4/22 0510)         PROGRESS, DATA ANALYSIS, CONSULTS, AND MEDICAL DECISION MAKING    All labs have been independently reviewed by me.  All radiology studies have been reviewed  by me and discussed with radiologist dictating the report.   EKG's independently viewed and interpreted by me.  Discussion below represents my analysis of pertinent findings related to patient's condition, differential diagnosis, treatment plan and final disposition.    DDx for the chest pain includes but is not limited to: ACS, PTX, PE, PNA, atelectasis, chest wall pain, GI mediated chest pain.  In addition to the labs below will obtain a troponin, EKG, portable chest x-ray, CT angiogram of the chest to further evaluate.    DDx for the left lower quadrant includes but is not limited to: Diverticulitis, constipation, colitis, ileus, partial SBO/SBO.  Will obtain CBC, CMP, lipase, CT abdomen pelvis to further evaluate.    Of note: If the patient's work-up is negative today he may need a venous Doppler to further rule out DVT.    ED Course as of 08/04/22 0533   u Aug 04, 2022   0103 BP: 138/85 [RC]   0103 Temp: 97.8 °F (36.6 °C) [RC]   0103 Heart Rate: 84 [RC]   0103 Resp: 18 [RC]   0103 SpO2: 98 %  RA [RC]   0250 Lipase: 20 [RC]   0250 WBC(!): 12.99 [RC]   0250 RBC(!): 3.77 [RC]   0250 Hemoglobin(!): 11.9 [RC]   0250 Hematocrit(!): 33.9 [RC]   0251 Platelets: 353 [RC]   0251 Glucose(!): 113 [RC]   0251 BUN(!): 31 [RC]   0251 Creatinine(!): 1.42 [RC]   0251 Sodium: 136 [RC]   0251 Potassium: 4.7 [RC]   0251 CO2(!): 21.6 [RC]   0251 Anion Gap(!): 15.4 [RC]   0251 Troponin T: <0.010 [RC]   0251 EKG          EKG time: 2105  Rhythm/Rate: 76/Sinus  P waves and OK: Normal pr interval  QRS, axis: Normal axis  ST and T waves: No acute ST or T wave changes noted    Interpreted Contemporaneously by me, independently viewed  -no change 6/27/2022 [RC]   0254 Both the patient's first and second troponins are noted to be negative at this time [RC]   0330  HEART SCORE    History Slightly or non-suspicious (0)  ECG Normal (0)  Age > or = 65 (2)  Risk factors > or = to 3 RF for atherosclerotic dx (2)  Troponin < or = Normal limit  (0)    This patient's HEART score is 4    HEART Score Key:  Scores 0-3: 0.9-1.7% risk of adverse cardiac event. In the HEART Score study, these patients were discharged (0.99% in the retrospective study, 1.7% in the prospective study)  Scores 4-6: 12-16.6% risk of adverse cardiac event. In the HEART Score study, these patients were admitted to the hospital. (11.6% retrospective, 16.6% prospective)  Scores ?7: 50-65% risk of adverse cardiac event. In the HEART Score study, these patients were candidates for early invasive measures. (65.2% retrospective, 50.1% prospective)      [RC]   0442 CT abdomen pelvis concerning for gastritis/duodenitis may be some small bleeding.  Patient CTA of the chest was unremarkable for PE.  Patient's D-dimer is also elevated and he does have some right lower extremity swelling.  It is felt the patient would be better served by admission to the hospital at this time where the gastritis can be further evaluated and an ultrasound can be obtained to definitively rule out DVT. [RC]   0525 Discussed the patient's case with [facility]  To admit to telemetry bed under Dr. Garcia care.  Working diagnosis will be atypical chest pain, gastritis, right lower extremity swelling, elevated D-dimer [RC]   0564 Patient CTA is negative for PE.  CT reading is concerning for bleeding gastritis.  Will place in a telemetry bed but will hold on Lovenox at this time due to the possible GI bleeding. [RC]      ED Course User Index  [RC] Law Zaidi III, PA           PPE: The patient wore a surgical mask throughout the entire patient encounter. I wore an N95.    AS OF 05:33 EDT VITALS:    BP - 135/85  HR - 74  TEMP - 97.8 °F (36.6 °C)  O2 SATS - 96%        DIAGNOSIS  Final diagnoses:   Acute gastritis, presence of bleeding unspecified, unspecified gastritis type   Atypical chest pain   Localized swelling of right lower extremity   Elevated d-dimer          DISPOSITION  ADMISSION    Discussed treatment plan and reason for admission with pt/family and admitting physician.  Pt/family voiced understanding of the plan for admission for further testing/treatment as needed.              Law Zaidi III, PA  08/04/22 0539

## 2022-08-04 NOTE — ED PROVIDER NOTES
MD ATTESTATION NOTE    The GERARDO and I have discussed this patient's history, physical exam, and treatment plan.    I provided a substantive portion of the care of this patient. I personally performed the physical exam, in its entirety. The attached note describes my personal findings.      Isaac Suarez is a 76 y.o. male who presents to the ED c/o shortness of breath and chest pain.  Chest Imburgia since yesterday.  States pain is worse when he takes a deep breath.  Also complaining of some lower abdominal pain.  Patient is 1 week postop for right total knee replacement.      On exam:  GENERAL: not distressed  HENT: nares patent  EYES: no scleral icterus  CV: regular rhythm, regular rate  RESPIRATORY: normal effort, tachypneic, clear to station bilaterally  ABDOMEN: soft  MUSCULOSKELETAL: no deformity  NEURO: alert, moves all extremities, follows commands  SKIN: warm, dry, signs of erythema or infection to right knee incision site    Labs  Recent Results (from the past 24 hour(s))   ECG 12 Lead    Collection Time: 08/03/22  9:05 PM   Result Value Ref Range    QT Interval 382 ms   Comprehensive Metabolic Panel    Collection Time: 08/03/22 11:04 PM    Specimen: Blood   Result Value Ref Range    Glucose 113 (H) 65 - 99 mg/dL    BUN 31 (H) 8 - 23 mg/dL    Creatinine 1.42 (H) 0.76 - 1.27 mg/dL    Sodium 136 136 - 145 mmol/L    Potassium 4.7 3.5 - 5.2 mmol/L    Chloride 99 98 - 107 mmol/L    CO2 21.6 (L) 22.0 - 29.0 mmol/L    Calcium 9.9 8.6 - 10.5 mg/dL    Total Protein 7.5 6.0 - 8.5 g/dL    Albumin 4.60 3.50 - 5.20 g/dL    ALT (SGPT) 15 1 - 41 U/L    AST (SGOT) 21 1 - 40 U/L    Alkaline Phosphatase 75 39 - 117 U/L    Total Bilirubin 0.7 0.0 - 1.2 mg/dL    Globulin 2.9 gm/dL    A/G Ratio 1.6 g/dL    BUN/Creatinine Ratio 21.8 7.0 - 25.0    Anion Gap 15.4 (H) 5.0 - 15.0 mmol/L    eGFR 51.5 (L) >60.0 mL/min/1.73   Troponin    Collection Time: 08/03/22 11:04 PM    Specimen: Blood   Result Value Ref Range    Troponin T  <0.010 0.000 - 0.030 ng/mL   Green Top (Gel)    Collection Time: 08/03/22 11:04 PM   Result Value Ref Range    Extra Tube Hold for add-ons.    Lavender Top    Collection Time: 08/03/22 11:04 PM   Result Value Ref Range    Extra Tube hold for add-on    Gold Top - SST    Collection Time: 08/03/22 11:04 PM   Result Value Ref Range    Extra Tube Hold for add-ons.    Light Blue Top    Collection Time: 08/03/22 11:04 PM   Result Value Ref Range    Extra Tube Hold for add-ons.    CBC Auto Differential    Collection Time: 08/03/22 11:04 PM    Specimen: Blood   Result Value Ref Range    WBC 12.99 (H) 3.40 - 10.80 10*3/mm3    RBC 3.77 (L) 4.14 - 5.80 10*6/mm3    Hemoglobin 11.9 (L) 13.0 - 17.7 g/dL    Hematocrit 33.9 (L) 37.5 - 51.0 %    MCV 89.9 79.0 - 97.0 fL    MCH 31.6 26.6 - 33.0 pg    MCHC 35.1 31.5 - 35.7 g/dL    RDW 13.1 12.3 - 15.4 %    RDW-SD 42.3 37.0 - 54.0 fl    MPV 8.7 6.0 - 12.0 fL    Platelets 353 140 - 450 10*3/mm3    Neutrophil % 78.8 (H) 42.7 - 76.0 %    Lymphocyte % 8.5 (L) 19.6 - 45.3 %    Monocyte % 5.7 5.0 - 12.0 %    Eosinophil % 2.5 0.3 - 6.2 %    Basophil % 0.3 0.0 - 1.5 %    Immature Grans % 4.2 (H) 0.0 - 0.5 %    Neutrophils, Absolute 10.24 (H) 1.70 - 7.00 10*3/mm3    Lymphocytes, Absolute 1.11 0.70 - 3.10 10*3/mm3    Monocytes, Absolute 0.74 0.10 - 0.90 10*3/mm3    Eosinophils, Absolute 0.32 0.00 - 0.40 10*3/mm3    Basophils, Absolute 0.04 0.00 - 0.20 10*3/mm3    Immature Grans, Absolute 0.54 (H) 0.00 - 0.05 10*3/mm3    nRBC 0.1 0.0 - 0.2 /100 WBC   D-dimer, Quantitative    Collection Time: 08/03/22 11:04 PM    Specimen: Blood   Result Value Ref Range    D-Dimer, Quantitative 2.44 (H) 0.00 - 0.49 MCGFEU/mL   Troponin    Collection Time: 08/04/22  2:12 AM    Specimen: Arm, Left; Blood   Result Value Ref Range    Troponin T <0.010 0.000 - 0.030 ng/mL   Lipase    Collection Time: 08/04/22  2:12 AM    Specimen: Arm, Left; Blood   Result Value Ref Range    Lipase 20 13 - 60 U/L       Radiology  XR  Chest 2 View    Result Date: 8/3/2022  TWO-VIEW CHEST  HISTORY: Chest pain. Recent knee surgery.  FINDINGS: The lungs are moderately well-expanded and clear and the heart size is normal. There is no acute disease or change from 12/20/2021.  This report was finalized on 8/3/2022 10:05 PM by Dr. Gurvinder Yeung M.D.      CT Abdomen Pelvis With Contrast    Addendum Date: 8/4/2022    ADDENDUM: 08 04 22 04:41 Call Doctor Regarding Active Bleeding in any site, called Dr. Zaidi on 08 04 04:41 (-04:00)     Result Date: 8/4/2022  CR Patient: ZOEY KRISHNAN  Time Out: 04:38 Exam(s): CT ABDOMEN + PELVIS With Contrast EXAM:   CT Abdomen and Pelvis With Intravenous Contrast CLINICAL HISTORY:    Reason for exam: Patient is right knee postop. He is complaining of chest discomfort, shortness of breath, nausea, vomiting, left lower quadrant pain. Rule out intra-abdominal pathology. TECHNIQUE:   Axial computed tomography images of the abdomen and pelvis with intravenous contrast.  CTDI is 30.4 mGy and DLP is 1750.0 mGy-cm.  This CT exam was performed according to the principle of ALARA (As Low As Reasonably Achievable) by using one or more of the following dose reduction techniques: automated exposure control, adjustment of the mA and or kV according to patient size, and or use of iterative reconstruction technique. COMPARISON:   None FINDINGS:   Lung bases:  Unremarkable.  No mass.  No consolidation.  ABDOMEN:   Liver:  Unremarkable.  No mass.   Gallbladder and bile ducts:  Unremarkable.  No calcified stones.  No ductal dilation.   Pancreas:  Unremarkable.  No mass.  No ductal dilation.   Spleen:  Unremarkable.  No splenomegaly.   Adrenals:  Unremarkable.  No mass.   Kidneys and ureters:  Bilateral renal cysts.  No hydronephrosis or obstructing stone.   Stomach and bowel:  Wall thickening of the gastric antrum, duodenal bulb, and first and second portions of the duodenum, concerning for gastritis and duodenitis.  Small 6 mm focus of  hyperdensity in the duodenal bulb, concerning for focus of bleeding.  Evaluation of the stomach is limited by underdistention.  Diverticulosis without evidence of diverticulitis.  No small bowel obstruction.  PELVIS:   Appendix:  Normal appendix.   Bladder:  Unremarkable.  No mass.   Reproductive:  Unremarkable as visualized.  ABDOMEN and PELVIS:   Intraperitoneal space:  Unremarkable.  No free air.  No significant fluid collection.   Bones joints:  Fixation of the right SI joint.  Post surgical changes at L4-5.  Grade 1 anterolisthesis of L4 on L5.  Degenerative changes of the spine.  No acute fracture.  No dislocation.   Soft tissues:  Small fat-containing umbilical hernia.   Vasculature:  Atherosclerotic changes of the vasculature.  No aortic aneurysm or dissection.   Lymph nodes:  Unremarkable.  No enlarged lymph nodes. IMPRESSION:       Wall thickening of the gastric antrum, duodenal bulb, and first and second portions of the duodenum, concerning for gastritis and duodenitis.  Small 6 mm focus of hyperdensity in the duodenal bulb, concerning for focus of bleeding.     Communications:  Call Doctor Active Bleeding in any site Electronically signed by Kimo Weldon M.D. on 08-04-22 at 0438    CT Angiogram Chest    Result Date: 8/4/2022  Patient: ZOEY KRISHNAN  Time Out: 04:33 Exam(s): CTA CHEST EXAM:   CT Angiography Chest With Intravenous Contrast CLINICAL HISTORY:    Reason for exam: Postoperative right knee replacement. Shortness of breath and chest discomfort. Rule out PE. TECHNIQUE:   Axial computed tomographic angiography images of the chest with intravenous contrast.  CTDI is 14.8 mGy and DLP is 590.1 mGy-cm.  This CT exam was performed according to the principle of ALARA (As Low As Reasonably Achievable) by using one or more of the following dose reduction techniques: automated exposure control, adjustment of the mA and or kV according to patient size, and or use of iterative reconstruction technique.    MIP reconstructed images were created and reviewed. COMPARISON:   None FINDINGS:   Pulmonary arteries:  No pulmonary embolus identified.   Aorta:  No aortic aneurysm or dissection.   Lungs:  Stable calcified granuloma in the left lower lobe.  No mass.   Pleural space:  Unremarkable.  No significant effusion.  No pneumothorax.   Heart:  Unremarkable.  No cardiomegaly.  No significant pericardial effusion.  No evidence of RV dysfunction.   Bones joints:  Bilateral shoulder arthroplasties.  Degenerative changes of the spine.  No acute fracture.  No dislocation.   Soft tissues:  Unremarkable.   Lymph nodes: Small calcified mediastinal and left hilar lymph nodes.  No enlarged lymph nodes. IMPRESSION:     1.  No pulmonary embolus identified. 2.  No aortic aneurysm or dissection. 3.  No acute pulmonary parenchymal abnormality identified.    Electronically signed by Kimo Weldon M.D. on 08-04-22 at 0433      Medical Decision Making:  ED Course as of 08/04/22 0603   u Aug 04, 2022   0103 BP: 138/85 [RC]   0103 Temp: 97.8 °F (36.6 °C) [RC]   0103 Heart Rate: 84 [RC]   0103 Resp: 18 [RC]   0103 SpO2: 98 %  RA [RC]   0250 Lipase: 20 [RC]   0250 WBC(!): 12.99 [RC]   0250 RBC(!): 3.77 [RC]   0250 Hemoglobin(!): 11.9 [RC]   0250 Hematocrit(!): 33.9 [RC]   0251 Platelets: 353 [RC]   0251 Glucose(!): 113 [RC]   0251 BUN(!): 31 [RC]   0251 Creatinine(!): 1.42 [RC]   0251 Sodium: 136 [RC]   0251 Potassium: 4.7 [RC]   0251 CO2(!): 21.6 [RC]   0251 Anion Gap(!): 15.4 [RC]   0251 Troponin T: <0.010 [RC]   0251 EKG          EKG time: 2105  Rhythm/Rate: 76/Sinus  P waves and OK: Normal pr interval  QRS, axis: Normal axis  ST and T waves: No acute ST or T wave changes noted    Interpreted Contemporaneously by me, independently viewed  -no change 6/27/2022 [RC]   0254 Both the patient's first and second troponins are noted to be negative at this time [RC]   0330  HEART SCORE    History Slightly or non-suspicious (0)  ECG Normal (0)  Age >  or = 65 (2)  Risk factors > or = to 3 RF for atherosclerotic dx (2)  Troponin < or = Normal limit (0)    This patient's HEART score is 4    HEART Score Key:  Scores 0-3: 0.9-1.7% risk of adverse cardiac event. In the HEART Score study, these patients were discharged (0.99% in the retrospective study, 1.7% in the prospective study)  Scores 4-6: 12-16.6% risk of adverse cardiac event. In the HEART Score study, these patients were admitted to the hospital. (11.6% retrospective, 16.6% prospective)  Scores ?7: 50-65% risk of adverse cardiac event. In the HEART Score study, these patients were candidates for early invasive measures. (65.2% retrospective, 50.1% prospective)      [RC]   0442 CT abdomen pelvis concerning for gastritis/duodenitis may be some small bleeding.  Patient CTA of the chest was unremarkable for PE.  Patient's D-dimer is also elevated and he does have some right lower extremity swelling.  It is felt the patient would be better served by admission to the hospital at this time where the gastritis can be further evaluated and an ultrasound can be obtained to definitively rule out DVT. [RC]   0525 Discussed the patient's case with ROSIO Paul with [facility].  To admit to telemetry bed under Dr. Garcia care.  Working diagnosis will be atypical chest pain, gastritis, right lower extremity swelling, elevated D-dimer [RC]   0564 Patient CTA is negative for PE.  CT reading is concerning for bleeding gastritis.  Will place in a telemetry bed but will hold on Lovenox at this time due to the possible GI bleeding. [RC]      ED Course User Index  [RC] Law Zaidi III, PA           PPE: Both the patient and I wore a surgical mask throughout the entire patient encounter. I wore protective goggles.     Diagnosis  Final diagnoses:   Acute gastritis, presence of bleeding unspecified, unspecified gastritis type   Atypical chest pain   Localized swelling of right lower extremity   Elevated d-dimer         Satish Giles MD  08/04/22 0603

## 2022-08-04 NOTE — PROGRESS NOTES
Discharge Planning Assessment  [facility]     Patient Name: Isaac Suarez  MRN: [id number]  Today's Date: 8/4/2022    Admit Date: 8/4/2022     Discharge Needs Assessment     Row Name 08/04/22 1353       Living Environment    People in Home spouse    Current Living Arrangements home    Primary Care Provided by self    Provides Primary Care For no one    Family Caregiver if Needed spouse    Family Caregiver Names Rachell Suarez [phone] spouse    Quality of Family Relationships supportive;involved;helpful    Able to Return to Prior Arrangements yes       Resource/Environmental Concerns    Resource/Environmental Concerns none    Transportation Concerns none       Transition Planning    Patient/Family Anticipates Transition to home with family    Patient/Family Anticipated Services at Transition none    Transportation Anticipated family or friend will provide       Discharge Needs Assessment    Equipment Currently Used at Home cane, straight;walker, standard    Concerns to be Addressed denies needs/concerns at this time               Discharge Plan     Row Name 08/04/22 6382       Plan    Plan Home with wife    Plan Comments Spoke wit pt at bedside to screen for DCP/needs.  Pt confirms that he does reside at [facility] address in a 2 story home with 3-4 MAYELIN.  Pt  plan will be to DC home.    Pt stated that he did have TKS about 1 1/2 weeks ago by Dr. Hamlet Sanchez at an [facility] in [place].  Pt stated that he has been doing PT at home with [facility] @ [place].  Pt reports that he plans to start oupt PT next week at [facility].  Pt stated that at baseline he does not use a walker but has been using a walker post TKA.  Pt did confirm PCP and pharmacy info as correct.  Pt stated that his wif will be avialable to transport him  home at [place]. CCP will follow to assist if any DC needs do arise.              Continued Care and Services - Admitted Since 8/4/2022    Coordination has not been started for this  encounter.       Expected Discharge Date and Time     Expected Discharge Date Expected Discharge Time    Aug 5, 2022          Demographic Summary     Row Name 08/04/22 1351       General Information    Arrived From home    Referral Source admission list    Reason for Consult discharge planning    Row Name 08/04/22 1350       General Information    Admission Type observation    Arrived From home    Referral Source admission list    Reason for Consult discharge planning    Preferred Language English               Functional Status     Row Name 08/04/22 1352       Functional Status    Usual Activity Tolerance good    Current Activity Tolerance moderate       Functional Status, IADL    Medications independent    Housekeeping independent    Laundry independent    Shopping independent       Mental Status Summary    Recent Changes in Mental Status/Cognitive Functioning no changes               Psychosocial    No documentation.                Abuse/Neglect    No documentation.                Legal    No documentation.                Substance Abuse    No documentation.                Patient Forms    No documentation.                   ELBA Griffin

## 2022-08-04 NOTE — ANESTHESIA POSTPROCEDURE EVALUATION
"Patient: Isaac Suarez    Procedure Summary     Date: 08/04/22 Room / Location:  [facility]    Anesthesia Start: 1050 Anesthesia Stop: 1108    Procedure: ESOPHAGOGASTRODUODENOSCOPY  WITH BIOPSIES (N/A Esophagus) Diagnosis:       Other acute gastritis, presence of bleeding unspecified      (Other acute gastritis, presence of bleeding unspecified [K29.00])    Surgeons: Korin Reilly MD Provider: Tyrel Duke MD    Anesthesia Type: MAC ASA Status: 3          Anesthesia Type: MAC    Vitals  Vitals Value Taken Time   /68 08/04/22 1119   Temp     Pulse 72 08/04/22 1119   Resp 17 08/04/22 1119   SpO2 98 % 08/04/22 1119           Post Anesthesia Care and Evaluation    Patient location during evaluation: bedside  Patient participation: complete - patient participated  Level of consciousness: sleepy but conscious  Pain score: 0  Pain management: adequate    Airway patency: patent  Anesthetic complications: No anesthetic complications    Cardiovascular status: acceptable  Respiratory status: acceptable  Hydration status: acceptable    Comments: /68   Pulse 72   Temp 36.8 °C (98.2 °F) (Oral)   Resp 17   Ht 182.9 cm (72.01\")   Wt 126 kg (277 lb 12.5 oz)   SpO2 98%   BMI 37.67 kg/m²         "

## 2022-08-04 NOTE — OUTREACH NOTE
Prep Survey    Flowsheet Row Responses   [facility] patient discharged from? [facility]   Is LACE score < 7 ? Yes   Emergency Room discharge w/ pulse ox? No   Eligibility [facility]   Date of Admission 08/04/22   Date of Discharge 08/04/22   Discharge Disposition Home or Self Care   Discharge diagnosis Chest Pain and Shortness of Breath  Duodenal ulcer without hemorrhage or perforation   Does the patient have one of the following disease processes/diagnoses(primary or secondary)? Other   Does the patient have Home health ordered? No   Is there a DME ordered? No   Prep survey completed? Yes          RACHEL PIEDRA - Registered Nurse

## 2022-08-04 NOTE — CONSULTS
"[facility]  Initial Inpatient Consult Note    Referring Provider: A    Reason for Consultation: GI bleed    Subjective     History of present illness:      Thank you for requesting my opinion.    76-year-old man with a past medical history as below in addition, he just had a total knee replacement 9 days ago who is here with complaints of shortness of breath and chest pain along with abdominal pain.  He was actually just recently to the ER with symptoms of fecal impaction.    He has been having mostly upper abdominal pain.  It is burning in nature.  Is associated with nausea and anorexia.  He has not been on any anticoagulation.  He has been on a baby aspirin.  He has been taking Excedrin daily because he was told ibuprofen was bad for him, he thinks he has been taking about 4-6 every day.  He has not had any diarrhea.    His hemoglobin was found to be 11.9.  On the 27th it was 12.6.  However at the end of 2021 it was 11.0.    CT imaging notable for wall thickening of the gastric antrum, duodenal bulb including the second portion of the duodenum that the radiologist notes is concerning for gastritis and duodenitis.  Note is made of a 6 mm \"hyperdensity in the duodenal bulb that is concerning for a focus of bleeding.\"  There is note made of diverticulosis.    He does not smoke cigarettes    He has never had an EGD.  He has had a colonoscopy in the past.    Past Medical History:  Past Medical History:   Diagnosis Date   • Abnormal MRI, lumbar spine    • Allergic rhinitis    • Arthritis    • Bilateral hearing loss    • Borderline blood pressure    • BPH (benign prostatic hyperplasia)    • Cancer (HCC)     PROSTATE   • Chronic back pain     when walking and lying down   • Chronic kidney disease (CKD), stage I    • Cough    • Edema    • Elevated transaminase level    • Erectile dysfunction    • External hemorrhoids    • GERD (gastroesophageal reflux disease)    • High cholesterol    • History " of colon polyps    • History of depression    • History of prostate cancer     RADIATION [place]   • Hyperlipidemia    • Hypertension    • Left knee pain    • Leg cramps    • Low back pain    • Lumbar stenosis    • Muscle spasm    • Prostate cancer (HCC)    • Renal cyst    • RLS (restless legs syndrome)    • Statin intolerance    • Vitamin D deficiency        Past Surgical History:  Past Surgical History:   Procedure Laterality Date   • CATARACT EXTRACTION Right    • COLONOSCOPY N/A 08/15/2016    Procedure: COLONOSCOPY INTO CECUM WITH COLD SNARE POLYPECTOMY;  Surgeon: Loc Lee MD;  Location: [facility] ENDOSCOPY;  Service:    • HERNIA REPAIR      bilat inguinal   • INSERTION PROSTATE RADIATION SEED     • JOINT REPLACEMENT Left 01/05/2022    left knee replacement   • LUMBAR DISCECTOMY FUSION INSTRUMENTATION N/A 09/23/2016    Procedure: L 4-5 LUMBAR DISCECTOMY FUSION WITH INSTRUMENTATION;  Surgeon: Michael Frias MD;  Location: [facility] MAIN OR;  Service:    • LUMBAR EPIDURAL INJECTION N/A 08/06/2021    Procedure: LUMBAR EPIDURAL 1ST VISIT 3-4;  Surgeon: Irasema Loco MD;  Location: [facility] MAIN OR;  Service: Pain Management;  Laterality: N/A;   • LUMBAR EPIDURAL INJECTION N/A 09/10/2021    Procedure: lumbar epidural steroid injection lumbar2-3;  Surgeon: Irasema Loco MD;  Location: [facility] MAIN OR;  Service: Pain Management;  Laterality: N/A;   • PROSTATE BIOPSY      Needle biopsy   • ROTATOR CUFF REPAIR Bilateral     metal in both   • SACROILIAC JOINT INJECTION Left 12/08/2021    Procedure: SACROILIAC INJECTION-- left;  Surgeon: Irasema Loco MD;  Location: [facility] MAIN OR;  Service: Pain Management;  Laterality: Left;   • SACROILIAC JOINT INJECTION Left 5/9/2022    Procedure: SACROILIAC INJECTION--left;  Surgeon: Irasema Loco MD;  Location: [facility] MAIN OR;  Service: Pain Management;  Laterality: Left;   • TOTAL SHOULDER ARTHROPLASTY Bilateral    • VASECTOMY          Social History:   Social History      Tobacco Use   • Smoking status: Former Smoker     Packs/day: 2.00     Years: 12.00     Pack years: 24.00     Types: Cigarettes     Quit date:      Years since quittin.6   • Smokeless tobacco: Never Used   Substance Use Topics   • Alcohol use: Yes     Alcohol/week: 2.0 standard drinks     Types: 2 Standard drinks or equivalent per week     Comment: SOCIALLY-Drinks 7 or less drinks per week        Family History:  Family History   Problem Relation Age of Onset   • Cancer Father         BRAIN   • Esophageal cancer Other        Home Meds:  Medications Prior to Admission   Medication Sig Dispense Refill Last Dose   • aspirin 81 MG EC tablet Take 81 mg by mouth 2 (Two) Times a Day.   8/3/2022 at Unknown time   • furosemide (LASIX) 20 MG tablet Take 1 tablet by mouth Every Morning. 90 tablet 1 8/3/2022 at Unknown time   • levocetirizine (XYZAL) 5 MG tablet Take 5 mg by mouth Every Evening.   8/3/2022 at Unknown time   • lisinopril (PRINIVIL,ZESTRIL) 10 MG tablet Take 1 tablet by mouth Every Morning. 90 tablet 1 8/3/2022 at Unknown time   • Lumigan 0.01 % ophthalmic drops    8/3/2022 at Unknown time   • pregabalin (LYRICA) 150 MG capsule Take 1 capsule by mouth 2 (Two) Times a Day. 180 capsule 1 8/3/2022 at Unknown time   • Repatha solution prefilled syringe injection INJECT 1 ML UNDER THE SKIN INTO THE APPROPRIATE AREA AS DIRECTED EVERY 14 DAYS 2 mL 5 Past Month at Unknown time       Current Meds:   aspirin, 325 mg, Oral, Once  sodium chloride, 10 mL, Intravenous, Q12H        Allergies:  Allergies   Allergen Reactions   • Crestor [Rosuvastatin] Myalgia   • Gabapentin Other (See Comments)     nightmares   • Penicillins Hives     HIVES   • Zetia [Ezetimibe] Myalgia   • Zocor [Simvastatin] Myalgia   • Hydrocodone-Acetaminophen Other (See Comments)     Causes nausea and vomiting, decreased appetite, couldn't stand the smell of food       Review of Systems  All systems were reviewed and negative except for:   Constitution:  positive for malaise  Gastrointestinal: positive for  nausea and pain     Objective     Vital Signs  Temp:  [97.8 °F (36.6 °C)-98.5 °F (36.9 °C)] 98.5 °F (36.9 °C)  Heart Rate:  [70-84] 70  Resp:  [18] 18  BP: (120-145)/(74-90) 135/90    Physical Exam:  Constitutional:   Alert, cooperative, in no acute distress, appears stated age   Eyes:           Lids and lashes normal, conjunctivae and sclerae normal,   no icterus   Ears, nose, mouth and throat:  Normal appearance of external ears and nose, no oral l  lesions, no thrush, oral mucosa moist   Respiratory:    Clear to auscultation, respirations regular, even and             unlabored    Cardiovascular:   Regular rhythm and normal rate, normal S1 and S2, no        murmur, no gallop, palpable distal pulses, no lower extremity edema   Gastrointestinal:    Soft, nondistended, mildly tender to palpation, no guarding, no rebound tenderness, normal bowel sounds, no palpable masses or organomegaly  Rectal exam: deferred   Musculoskeletal:  Normal station, no atrophy, no tenderness to palpation, normal digits and nails   Skin:  Normal color, no bleeding, bruising, rashes or lesions   Lymphatics:  No palpable cervical or supraclavicular adenopathy   Psychiatric:  Judgement and insight: normal   Orientation to person, place and time: normal   Mood and affect: normal       Results Review:   I reviewed the patient's new clinical results.    Results from last 7 days   Lab Units 08/03/22  2304   WBC 10*3/mm3 12.99*   HEMOGLOBIN g/dL 11.9*   HEMATOCRIT % 33.9*   PLATELETS 10*3/mm3 353       Results from last 7 days   Lab Units 08/03/22  2304   SODIUM mmol/L 136   POTASSIUM mmol/L 4.7   CHLORIDE mmol/L 99   CO2 mmol/L 21.6*   BUN mg/dL 31*   CREATININE mg/dL 1.42*   CALCIUM mg/dL 9.9   BILIRUBIN mg/dL 0.7   ALK PHOS U/L 75   ALT (SGPT) U/L 15   AST (SGOT) U/L 21   GLUCOSE mg/dL 113*             Lab Results   Lab Value Date/Time    LIPASE 20 08/04/2022 0212        Radiology:  Imaging Results (Last 72 Hours)     Procedure Component Value Units Date/Time    CT Abdomen Pelvis With Contrast [[id number]] Collected: 08/04/22 0439     Updated: 08/04/22 0442    Addenda:        ADDENDUM:  08 04 22 04:41 Call Doctor Regarding Active Bleeding in any site, called   Dr. Zaidi on 08 04 04:41 (-04:00)      Signed: 08/04/22 0438 by Kimo Weldon MD    Narrative:      CR  Patient: ZOEY KRISHNAN  Time Out: 04:38  Exam(s): CT ABDOMEN + PELVIS With Contrast     EXAM:    CT Abdomen and Pelvis With Intravenous Contrast    CLINICAL HISTORY:     Reason for exam: Patient is right knee postop. He is complaining of   chest discomfort, shortness of breath, nausea, vomiting, left lower   quadrant pain. Rule out intra-abdominal pathology.    TECHNIQUE:    Axial computed tomography images of the abdomen and pelvis with   intravenous contrast.  CTDI is 30.4 mGy and DLP is 1750.0 mGy-cm.  This   CT exam was performed according to the principle of ALARA (As Low As   Reasonably Achievable) by using one or more of the following dose   reduction techniques: automated exposure control, adjustment of the mA   and or kV according to patient size, and or use of iterative   reconstruction technique.    COMPARISON:    None    FINDINGS:    Lung bases:  Unremarkable.  No mass.  No consolidation.     ABDOMEN:    Liver:  Unremarkable.  No mass.    Gallbladder and bile ducts:  Unremarkable.  No calcified stones.  No   ductal dilation.    Pancreas:  Unremarkable.  No mass.  No ductal dilation.    Spleen:  Unremarkable.  No splenomegaly.    Adrenals:  Unremarkable.  No mass.    Kidneys and ureters:  Bilateral renal cysts.  No hydronephrosis or   obstructing stone.    Stomach and bowel:  Wall thickening of the gastric antrum, duodenal   bulb, and first and second portions of the duodenum, concerning for   gastritis and duodenitis.  Small 6 mm focus of hyperdensity in the   duodenal bulb, concerning for focus of  bleeding.  Evaluation of the   stomach is limited by underdistention.  Diverticulosis without evidence   of diverticulitis.  No small bowel obstruction.     PELVIS:    Appendix:  Normal appendix.    Bladder:  Unremarkable.  No mass.    Reproductive:  Unremarkable as visualized.     ABDOMEN and PELVIS:    Intraperitoneal space:  Unremarkable.  No free air.  No significant   fluid collection.    Bones joints:  Fixation of the right SI joint.  Post surgical changes   at L4-5.  Grade 1 anterolisthesis of L4 on L5.  Degenerative changes of   the spine.  No acute fracture.  No dislocation.    Soft tissues:  Small fat-containing umbilical hernia.    Vasculature:  Atherosclerotic changes of the vasculature.  No aortic   aneurysm or dissection.    Lymph nodes:  Unremarkable.  No enlarged lymph nodes.    IMPRESSION:         Wall thickening of the gastric antrum, duodenal bulb, and first and   second portions of the duodenum, concerning for gastritis and duodenitis.    Small 6 mm focus of hyperdensity in the duodenal bulb, concerning for   focus of bleeding.      Impression:            Communications:     Call Doctor Active Bleeding in any site    Electronically signed by Kimo Weldon M.D. on 08-04-22 at 0438    CT Angiogram Chest [[id number]] Collected: 08/04/22 0434     Updated: 08/04/22 0434    Narrative:        Patient: ZOEY KRISHNAN  Time Out: 04:33  Exam(s): CTA CHEST     EXAM:    CT Angiography Chest With Intravenous Contrast    CLINICAL HISTORY:     Reason for exam: Postoperative right knee replacement. Shortness of   breath and chest discomfort. Rule out PE.    TECHNIQUE:    Axial computed tomographic angiography images of the chest with   intravenous contrast.  CTDI is 14.8 mGy and DLP is 590.1 mGy-cm.  This CT   exam was performed according to the principle of ALARA (As Low As   Reasonably Achievable) by using one or more of the following dose   reduction techniques: automated exposure control, adjustment of the mA    and or kV according to patient size, and or use of iterative   reconstruction technique.    MIP reconstructed images were created and reviewed.    COMPARISON:    None    FINDINGS:    Pulmonary arteries:  No pulmonary embolus identified.    Aorta:  No aortic aneurysm or dissection.    Lungs:  Stable calcified granuloma in the left lower lobe.  No mass.    Pleural space:  Unremarkable.  No significant effusion.  No   pneumothorax.    Heart:  Unremarkable.  No cardiomegaly.  No significant pericardial   effusion.  No evidence of RV dysfunction.    Bones joints:  Bilateral shoulder arthroplasties.  Degenerative changes   of the spine.  No acute fracture.  No dislocation.    Soft tissues:  Unremarkable.    Lymph nodes: Small calcified mediastinal and left hilar lymph nodes.    No enlarged lymph nodes.    IMPRESSION:       1.  No pulmonary embolus identified.  2.  No aortic aneurysm or dissection.  3.  No acute pulmonary parenchymal abnormality identified.    Impression:          Electronically signed by Kimo Weldon M.D. on 08-04-22 at 0433    XR Chest 2 View [[id number]] Collected: 08/03/22 2205     Updated: 08/03/22 2208    Narrative:      TWO-VIEW CHEST     HISTORY: Chest pain. Recent knee surgery.     FINDINGS: The lungs are moderately well-expanded and clear and the heart  size is normal. There is no acute disease or change from 12/20/2021.     This report was finalized on 8/3/2022 10:05 PM by Dr. Gurvinder Yeung M.D.             Assessment & Plan       Acute gastritis      Impression  1.  Abnormal CT imaging of the stomach and small bowel    2.  Excessive NSAID use    3.  Recent right total knee replacement    4.  Recent fecal impaction    Plan  Continue PPI drip  Plan for EGD today to further assess abnormal findings on imaging  Needs to stop Excedrin use  Bowel regimen  Further recommendations after EGD.    I discussed the patients findings and my recommendations with patient    All necessary PPE, including  face mask and eye protection, were worn during this encounter.  Hand sanitization was performed both before and after the patient interaction.    Yanci Babb MD  [facility]      Dictated utilizing Dragon dictation

## 2022-08-04 NOTE — DISCHARGE SUMMARY
Patient Name: Isaac Suarez  : 1945  MRN: [id number]    Date of Admission: 2022  Date of Discharge:  2022  Primary Care Physician: Guillermo Moran MD      Chief Complaint:   Chest Pain and Shortness of Breath      Discharge Diagnoses     Active Hospital Problems    Diagnosis  POA   • **Duodenal ulcer without hemorrhage or perforation [K26.9]  Yes   • Acute gastritis [K29.00]  Yes   • Obesity (BMI 30-39.9) [E66.9]  Yes   • Acute duodenitis [K29.80]  Yes   • Status post right knee replacement [Z96.651]  Not Applicable   • NSAIDs adverse effect [T39.395A]  Unknown   • Stage 3a chronic kidney disease (HCC) [N18.31]  Yes   • Hypertension [I10]  Yes      Resolved Hospital Problems   No resolved problems to display.        Hospital Course     Mr. Suarez is a 76 y.o. male with a history of recent knee surgery who presented to [facility] initially complaining of chest tightness.  Please see the admitting history and physical for further details.  He was found to have duodenitis and gastritis on CT scan and was admitted to the hospital for further evaluation and treatment.  Cardiac enzymes are negative.  CT scan chest negative for pulmonary embolus.  Gastroenterology was consulted and recommended and performed EGD with results outlined below.  He will be discharged home today on Protonix and Carafate and follow-up with his PCP.  He was instructed to stop using Excedrin.      Day of Discharge     Subjective:  Doing well this afternoon.  Tolerated lunch.    Physical Exam:  Temp:  [97.8 °F (36.6 °C)-98.6 °F (37 °C)] 98.6 °F (37 °C)  Heart Rate:  [66-84] 77  Resp:  [11-18] 18  BP: ()/(49-90) 127/62  Body mass index is 37.67 kg/m².  Physical Exam  Vitals and nursing note reviewed.   Constitutional:       Appearance: Normal appearance.   Pulmonary:      Effort: Pulmonary effort is normal.   Neurological:      Mental Status: He is alert. Mental status is at baseline.   Psychiatric:          Mood and Affect: Mood normal.         Consultants     Consult Orders (all) (From admission, onward)     Start     Ordered    08/04/22 0537  Inpatient Gastroenterology Consult  Once        Specialty:  Gastroenterology  Provider:  Korin Reilly MD    08/04/22 0536              Procedures     ESOPHAGOGASTRODUODENOSCOPY  WITH BIOPSIES  - Normal esophagus.  - Erosive gastritis. Biopsied.  - Non-bleeding duodenal ulcer with no stigmata of bleeding.  - The examination was otherwise normal.    - Await pathology results.  - Results will be communicated to you within 14 days - please contact the office at [phone] if you have not  received results  - Avoid NSAIDs.  - Use Protonix (pantoprazole) 40 mg PO BID for 8 weeks.  - Use sucralfate tablets 1 gram PO QID.      Imaging Results (All)     Procedure Component Value Units Date/Time    CT Abdomen Pelvis With Contrast [[id number]] Collected: 08/04/22 0439     Updated: 08/04/22 0442    Addenda:        ADDENDUM:  08 04 22 04:41 Call Doctor Regarding Active Bleeding in any site, called   Dr. Zaidi on 08 04 04:41 (-04:00)      Signed: 08/04/22 0438 by Kimo Weldon MD    Narrative:      CR  Patient: ZOEY RKISHNAN  Time Out: 04:38  Exam(s): CT ABDOMEN + PELVIS With Contrast     EXAM:    CT Abdomen and Pelvis With Intravenous Contrast    CLINICAL HISTORY:     Reason for exam: Patient is right knee postop. He is complaining of   chest discomfort, shortness of breath, nausea, vomiting, left lower   quadrant pain. Rule out intra-abdominal pathology.    TECHNIQUE:    Axial computed tomography images of the abdomen and pelvis with   intravenous contrast.  CTDI is 30.4 mGy and DLP is 1750.0 mGy-cm.  This   CT exam was performed according to the principle of ALARA (As Low As   Reasonably Achievable) by using one or more of the following dose   reduction techniques: automated exposure control, adjustment of the mA   and or kV according to patient size, and or use of  iterative   reconstruction technique.    COMPARISON:    None    FINDINGS:    Lung bases:  Unremarkable.  No mass.  No consolidation.     ABDOMEN:    Liver:  Unremarkable.  No mass.    Gallbladder and bile ducts:  Unremarkable.  No calcified stones.  No   ductal dilation.    Pancreas:  Unremarkable.  No mass.  No ductal dilation.    Spleen:  Unremarkable.  No splenomegaly.    Adrenals:  Unremarkable.  No mass.    Kidneys and ureters:  Bilateral renal cysts.  No hydronephrosis or   obstructing stone.    Stomach and bowel:  Wall thickening of the gastric antrum, duodenal   bulb, and first and second portions of the duodenum, concerning for   gastritis and duodenitis.  Small 6 mm focus of hyperdensity in the   duodenal bulb, concerning for focus of bleeding.  Evaluation of the   stomach is limited by underdistention.  Diverticulosis without evidence   of diverticulitis.  No small bowel obstruction.     PELVIS:    Appendix:  Normal appendix.    Bladder:  Unremarkable.  No mass.    Reproductive:  Unremarkable as visualized.     ABDOMEN and PELVIS:    Intraperitoneal space:  Unremarkable.  No free air.  No significant   fluid collection.    Bones joints:  Fixation of the right SI joint.  Post surgical changes   at L4-5.  Grade 1 anterolisthesis of L4 on L5.  Degenerative changes of   the spine.  No acute fracture.  No dislocation.    Soft tissues:  Small fat-containing umbilical hernia.    Vasculature:  Atherosclerotic changes of the vasculature.  No aortic   aneurysm or dissection.    Lymph nodes:  Unremarkable.  No enlarged lymph nodes.    IMPRESSION:         Wall thickening of the gastric antrum, duodenal bulb, and first and   second portions of the duodenum, concerning for gastritis and duodenitis.    Small 6 mm focus of hyperdensity in the duodenal bulb, concerning for   focus of bleeding.      Impression:            Communications:     Call Doctor Active Bleeding in any site    Electronically signed by Kimo Weldon  M.D. on 08-04-22 at 0438    CT Angiogram Chest [[id number]] Collected: 08/04/22 0434     Updated: 08/04/22 0434    Narrative:        Patient: ZOEY KRISHNAN  Time Out: 04:33  Exam(s): CTA CHEST     EXAM:    CT Angiography Chest With Intravenous Contrast    CLINICAL HISTORY:     Reason for exam: Postoperative right knee replacement. Shortness of   breath and chest discomfort. Rule out PE.    TECHNIQUE:    Axial computed tomographic angiography images of the chest with   intravenous contrast.  CTDI is 14.8 mGy and DLP is 590.1 mGy-cm.  This CT   exam was performed according to the principle of ALARA (As Low As   Reasonably Achievable) by using one or more of the following dose   reduction techniques: automated exposure control, adjustment of the mA   and or kV according to patient size, and or use of iterative   reconstruction technique.    MIP reconstructed images were created and reviewed.    COMPARISON:    None    FINDINGS:    Pulmonary arteries:  No pulmonary embolus identified.    Aorta:  No aortic aneurysm or dissection.    Lungs:  Stable calcified granuloma in the left lower lobe.  No mass.    Pleural space:  Unremarkable.  No significant effusion.  No   pneumothorax.    Heart:  Unremarkable.  No cardiomegaly.  No significant pericardial   effusion.  No evidence of RV dysfunction.    Bones joints:  Bilateral shoulder arthroplasties.  Degenerative changes   of the spine.  No acute fracture.  No dislocation.    Soft tissues:  Unremarkable.    Lymph nodes: Small calcified mediastinal and left hilar lymph nodes.    No enlarged lymph nodes.    IMPRESSION:       1.  No pulmonary embolus identified.  2.  No aortic aneurysm or dissection.  3.  No acute pulmonary parenchymal abnormality identified.    Impression:          Electronically signed by Kimo Weldon M.D. on 08-04-22 at 0433    XR Chest 2 View [[id number]] Collected: 08/03/22 2205     Updated: 08/03/22 2208    Narrative:      TWO-VIEW CHEST     HISTORY: Chest  pain. Recent knee surgery.     FINDINGS: The lungs are moderately well-expanded and clear and the heart  size is normal. There is no acute disease or change from 12/20/2021.     This report was finalized on 8/3/2022 10:05 PM by Dr. Gurvinder Yeung M.D.           Results for orders placed during the hospital encounter of 07/31/22    Duplex Venous Lower Extremity - Bilateral CAR    Interpretation Summary  · Normal bilateral lower extremity venous duplex scan.      Pertinent Labs     Results from last 7 days   Lab Units 08/03/22  2304   WBC 10*3/mm3 12.99*   HEMOGLOBIN g/dL 11.9*   PLATELETS 10*3/mm3 353     Results from last 7 days   Lab Units 08/03/22  2304   SODIUM mmol/L 136   POTASSIUM mmol/L 4.7   CHLORIDE mmol/L 99   CO2 mmol/L 21.6*   BUN mg/dL 31*   CREATININE mg/dL 1.42*   GLUCOSE mg/dL 113*   EGFR mL/min/1.73 51.5*     Results from last 7 days   Lab Units 08/03/22  2304   ALBUMIN g/dL 4.60   BILIRUBIN mg/dL 0.7   ALK PHOS U/L 75   AST (SGOT) U/L 21   ALT (SGPT) U/L 15     Results from last 7 days   Lab Units 08/03/22  2304   CALCIUM mg/dL 9.9   ALBUMIN g/dL 4.60     Results from last 7 days   Lab Units 08/04/22  0212   LIPASE U/L 20     Results from last 7 days   Lab Units 08/04/22  0212 08/03/22  2304   TROPONIN T ng/mL <0.010 <0.010   D DIMER QUANT MCGFEU/mL  --  2.44*           Invalid input(s): LDLCALC      Results from last 7 days   Lab Units 08/04/22  0523   COVID19  Not Detected       Test Results Pending at Discharge     Pending Labs     Order Current Status    Tissue Pathology Exam In process          Discharge Details        Discharge Medications      New Medications      Instructions Start Date   pantoprazole 40 MG EC tablet  Commonly known as: PROTONIX   40 mg, Oral, 2 Times Daily Before Meals      sucralfate 1 g tablet  Commonly known as: CARAFATE   1 g, Oral, 4 Times Daily Before Meals & Nightly         Continue These Medications      Instructions Start Date   aspirin 81 MG EC tablet   81 mg,  Oral, 2 Times Daily      furosemide 20 MG tablet  Commonly known as: LASIX   20 mg, Oral, Every Morning      levocetirizine 5 MG tablet  Commonly known as: XYZAL   5 mg, Oral, Every Evening      lisinopril 10 MG tablet  Commonly known as: PRINIVIL,ZESTRIL   10 mg, Oral, Every Morning      Lumigan 0.01 % ophthalmic drops  Generic drug: bimatoprost   No dose, route, or frequency recorded.      pregabalin 150 MG capsule  Commonly known as: LYRICA   150 mg, Oral, 2 Times Daily      Repatha solution prefilled syringe injection  Generic drug: Evolocumab   INJECT 1 ML UNDER THE SKIN INTO THE APPROPRIATE AREA AS DIRECTED EVERY 14 DAYS             Allergies   Allergen Reactions   • Crestor [Rosuvastatin] Myalgia   • Gabapentin Other (See Comments)     nightmares   • Penicillins Hives     HIVES   • Zetia [Ezetimibe] Myalgia   • Zocor [Simvastatin] Myalgia   • Hydrocodone-Acetaminophen Other (See Comments)     Causes nausea and vomiting, decreased appetite, couldn't stand the smell of food       Discharge Disposition:        Discharge Diet:  Diet Order   Procedures   • Diet Full Liquid       Discharge Activity:   Activity Instructions     Activity as Tolerated            CODE STATUS:    Code Status and Medical Interventions:   Ordered at: 08/04/22 0536     Code Status (Patient has no pulse and is not breathing):    CPR (Attempt to Resuscitate)     Medical Interventions (Patient has pulse or is breathing):    Full Support       Future Appointments   Date Time Provider Department [place]   8/12/2022 11:00 AM Coco Ring APRN [facility]     Additional Instructions for the Follow-ups that You Need to Schedule     Discharge Follow-up with PCP   As directed       Currently Documented PCP:    Guillermo Moran MD    PCP Phone Number:    [phone]     Follow Up Details: 1 to 2 weeks (or sooner if problems)            Follow-up Information     Guillermo Moran MD .    Specialty: Internal Medicine  Why: 1 to 2 weeks (or  sooner if problems)  Contact information:  [address] [facility] [address]  [facility] [address]  [phone]                         Additional Instructions for the Follow-ups that You Need to Schedule     Discharge Follow-up with PCP   As directed       Currently Documented PCP:    Guillermo Moran MD    PCP Phone Number:    [phone]     Follow Up Details: 1 to 2 weeks (or sooner if problems)               Ja Vega MD  [facility]  08/04/22  15:13 EDT

## 2022-08-04 NOTE — ANESTHESIA PREPROCEDURE EVALUATION
Anesthesia Evaluation     Patient summary reviewed and Nursing notes reviewed   NPO Solid Status: > 8 hours  NPO Liquid Status: > 4 hours           Airway   Mallampati: II  Neck ROM: full  No difficulty expected  Dental      Comment: crowns    Pulmonary     breath sounds clear to auscultation  (+) a smoker Former,   Cardiovascular     Rhythm: regular    (+) hypertension, hyperlipidemia,       Neuro/Psych  (+) numbness,    GI/Hepatic/Renal/Endo    (+) obesity, morbid obesity, GERD,  renal disease,     Musculoskeletal     Abdominal   (+) obese,    Substance History      OB/GYN          Other   arthritis,    history of cancer                    Anesthesia Plan    ASA 3     MAC     intravenous induction     Anesthetic plan, risks, benefits, and alternatives have been provided, discussed and informed consent has been obtained with: patient.        CODE STATUS:    Code Status (Patient has no pulse and is not breathing): CPR (Attempt to Resuscitate)  Medical Interventions (Patient has pulse or is breathing): Full Support

## 2022-08-05 ENCOUNTER — TRANSITIONAL CARE MANAGEMENT TELEPHONE ENCOUNTER (OUTPATIENT)
Dept: CALL CENTER | Facility: HOSPITAL | Age: 77
End: 2022-08-05

## 2022-08-05 LAB
LAB AP CASE REPORT: NORMAL
PATH REPORT.FINAL DX SPEC: NORMAL
PATH REPORT.GROSS SPEC: NORMAL

## 2022-08-05 NOTE — OUTREACH NOTE
Call Center TCM Note    Flowsheet Row Responses   [facility] patient discharged from? [place]   Does the patient have one of the following disease processes/diagnoses(primary or secondary)? Other   TCM attempt successful? Yes   Meds reviewed with patient/caregiver? Yes   Is the patient having any side effects they believe may be caused by any medication additions or changes? No   Does the patient have all medications ordered at discharge? No   What is keeping the patient from filling the prescriptions? --  [Meds were not ready yesterday when he got to pharmacy on way home from hospital. He recieved meddage they are now ready. He will  today. ]   Nursing Interventions No intervention needed, Nurse provided patient education   Prescription comments Patient picking up meds today from pharmacy.    Is the patient taking all medications as directed (includes completed medication regime)? No   What is preventing the patient from taking all medications as directed? Other   Does the patient have a primary care provider?  Yes   Does the patient have an appointment with their PCP within 7 days of discharge? No   Comments regarding PCP Patient prefers to make own appt with Dr Moran. He has a GI followup on 8/11/2022 already scheduled.    What is preventing the patient from scheduling follow up appointments within 7 days of discharge? Haven't had time   Nursing Interventions Verified appointment date/time/provider, Educated patient on importance of making appointment, Advised patient to make appointment   Has the patient kept scheduled appointments due by today? N/A   Has home health visited the patient within 72 hours of discharge? N/A   Psychosocial issues? No   Did the patient receive a copy of their discharge instructions? Yes   Nursing interventions Reviewed instructions with patient   What is the patient's perception of their health status since discharge? Improving   Is the patient/caregiver able to teach  back signs and symptoms related to disease process for when to call PCP? Yes   Is the patient/caregiver able to teach back signs and symptoms related to disease process for when to call 911? Yes   Is the patient/caregiver able to teach back the hierarchy of who to call/visit for symptoms/problems? PCP, Specialist, Home health nurse, Urgent Care, ED, 911 Yes   If the patient is a current smoker, are they able to teach back resources for cessation? Not a smoker   TCM call completed? Yes   Wrap up additional comments Patient reports he is doing well. No issues or concerns.           Rajinder Jett RN    8/5/2022, 12:03 EDT        Rajinder Jett RN    8/5/2022, 12:01 EDT

## 2022-08-05 NOTE — CASE MANAGEMENT/SOCIAL WORK
Case Management Discharge Note      Final Note: Home via family, no additional CCP needs. Tony RN/CCP         Selected Continued Care - Discharged on 8/4/2022 Admission date: 8/4/2022 - Discharge disposition: Home or Self Care    Destination    No services have been selected for the patient.              Durable Medical Equipment    No services have been selected for the patient.              Dialysis/Infusion    No services have been selected for the patient.              Home Medical Care    No services have been selected for the patient.              Therapy    No services have been selected for the patient.              Community Resources    No services have been selected for the patient.              Community & DME    No services have been selected for the patient.                       Final Discharge Disposition Code: 01 - home or self-care

## 2022-08-11 ENCOUNTER — OFFICE VISIT (OUTPATIENT)
Dept: GASTROENTEROLOGY | Facility: CLINIC | Age: 77
End: 2022-08-11

## 2022-08-11 VITALS
SYSTOLIC BLOOD PRESSURE: 115 MMHG | WEIGHT: 267.6 LBS | BODY MASS INDEX: 36.24 KG/M2 | DIASTOLIC BLOOD PRESSURE: 73 MMHG | TEMPERATURE: 97.5 F | HEIGHT: 72 IN

## 2022-08-11 DIAGNOSIS — K26.9 DUODENAL ULCER WITHOUT HEMORRHAGE OR PERFORATION: ICD-10-CM

## 2022-08-11 DIAGNOSIS — K26.9 DUODENAL ULCER: Primary | ICD-10-CM

## 2022-08-11 DIAGNOSIS — K29.00 ACUTE SUPERFICIAL GASTRITIS WITHOUT HEMORRHAGE: ICD-10-CM

## 2022-08-11 PROCEDURE — 99214 OFFICE O/P EST MOD 30 MIN: CPT | Performed by: PHYSICIAN ASSISTANT

## 2022-08-11 RX ORDER — SUCRALFATE 1 G/1
1 TABLET ORAL
Qty: 120 TABLET | Refills: 0 | Status: SHIPPED | OUTPATIENT
Start: 2022-08-11 | End: 2022-09-15

## 2022-08-11 RX ORDER — PANTOPRAZOLE SODIUM 40 MG/1
40 TABLET, DELAYED RELEASE ORAL
Qty: 180 TABLET | Refills: 1 | Status: SHIPPED | OUTPATIENT
Start: 2022-08-11

## 2022-08-11 NOTE — PROGRESS NOTES
Chief Complaint  duodenal ulcer    Subjective        History of Present Illness  Isaac Suarez is a  76 y.o. male here for hospital follow-up.  He will follow with Dr. Babb.  During admission underwent EGD with erosive gastritis and nonbleeding cratered duodenal ulcer measuring up to 15 mm without stigmata of bleed.  Hemoglobin on 8/3/2022 11.9.     Presents today accompanied by his wife.  He has no abdominal pain, heartburn/reflux, dysphagia, nausea or vomiting, melena or hematochezia.  He states he is feeling much better.  Discontinued ibuprofen and Celebrex.  He has been avoiding NSAIDs.  He is taking pantoprazole 40 mg p.o. twice daily and Carafate 1 g 4 times daily.  He has had a return of appetite.  His bowels are moving well.      Past Medical History:   Diagnosis Date   • Abnormal MRI, lumbar spine    • Allergic rhinitis    • Arthritis    • Bilateral hearing loss    • Borderline blood pressure    • BPH (benign prostatic hyperplasia)    • Cancer (HCC)     PROSTATE   • Chronic back pain     when walking and lying down   • Chronic kidney disease (CKD), stage I    • Cough    • Edema    • Elevated transaminase level    • Erectile dysfunction    • External hemorrhoids    • GERD (gastroesophageal reflux disease)    • High cholesterol    • History of colon polyps    • History of depression    • History of prostate cancer     RADIATION TX   • Hyperlipidemia    • Hypertension    • Left knee pain    • Leg cramps    • Low back pain    • Lumbar stenosis    • Muscle spasm    • Prostate cancer (HCC)    • Renal cyst    • RLS (restless legs syndrome)    • Statin intolerance    • Vitamin D deficiency        Past Surgical History:   Procedure Laterality Date   • CATARACT EXTRACTION Right    • COLONOSCOPY N/A 08/15/2016    Procedure: COLONOSCOPY INTO CECUM WITH COLD SNARE POLYPECTOMY;  Surgeon: Loc Lee MD;  Location: [facility] ENDOSCOPY;  Service:    • ENDOSCOPY N/A 08/04/2022    Procedure: ESOPHAGOGASTRODUODENOSCOPY   WITH BIOPSIES;  Surgeon: Korin Reilly MD;  Location: [facility] ENDOSCOPY;  Service: Gastroenterology;  Laterality: N/A;  pre: abnormal imaging, anemia  post: erosive gastritis, duodenal ulcer   • HERNIA REPAIR      bilat inguinal   • INSERTION PROSTATE RADIATION SEED     • JOINT REPLACEMENT Left 2022    left knee replacement   • LUMBAR DISCECTOMY FUSION INSTRUMENTATION N/A 2016    Procedure: L 4-5 LUMBAR DISCECTOMY FUSION WITH INSTRUMENTATION;  Surgeon: Michael Frias MD;  Location:  [facility];  Service:    • LUMBAR EPIDURAL INJECTION N/A 2021    Procedure: LUMBAR EPIDURAL 1ST VISIT 3-4;  Surgeon: Irasema Loco MD;  Location: [facility];  Service: Pain Management;  Laterality: N/A;   • LUMBAR EPIDURAL INJECTION N/A 09/10/2021    Procedure: lumbar epidural steroid injection lumbar2-3;  Surgeon: Irasema Loco MD;  Location: [facility];  Service: Pain Management;  Laterality: N/A;   • PROSTATE BIOPSY      Needle biopsy   • ROTATOR CUFF REPAIR Bilateral     metal in both   • SACROILIAC JOINT INJECTION Left 2021    Procedure: SACROILIAC INJECTION-- left;  Surgeon: Irasema Loco MD;  Location: [facility];  Service: Pain Management;  Laterality: Left;   • SACROILIAC JOINT INJECTION Left 2022    Procedure: SACROILIAC INJECTION--left;  Surgeon: Irasema Loco MD;  Location: [facility];  Service: Pain Management;  Laterality: Left;   • TOTAL SHOULDER ARTHROPLASTY Bilateral    • VASECTOMY         Family History   Problem Relation Age of Onset   • Cancer Father         BRAIN   • Esophageal cancer Other        Social History     Socioeconomic History   • Marital status:    • Years of education: 12TH GRADE   Tobacco Use   • Smoking status: Former Smoker     Packs/day: 2.00     Years: 12.00     Pack years: 24.00     Types: Cigarettes     Quit date: 1976     Years since quittin.6   • Smokeless tobacco: Never Used   Vaping Use   • Vaping Use: Never used    Substance and Sexual Activity   • Alcohol use: Yes     Comment: SOCIALLY-Drinks 7 or less drinks per week   • Drug use: No   • Sexual activity: Yes     Partners: Female     Birth control/protection: None       Allergies   Allergen Reactions   • Crestor [Rosuvastatin] Myalgia   • Gabapentin Other (See Comments)     nightmares   • Penicillins Hives     HIVES   • Zetia [Ezetimibe] Myalgia   • Zocor [Simvastatin] Myalgia   • Hydrocodone-Acetaminophen Other (See Comments)     Causes nausea and vomiting, decreased appetite, couldn't stand the smell of food       Current Outpatient Medications on File Prior to Visit   Medication Sig Dispense Refill   • aspirin 81 MG EC tablet Take 81 mg by mouth 2 (Two) Times a Day.     • furosemide (LASIX) 20 MG tablet Take 1 tablet by mouth Every Morning. 90 tablet 1   • levocetirizine (XYZAL) 5 MG tablet Take 5 mg by mouth Every Evening.     • lisinopril (PRINIVIL,ZESTRIL) 10 MG tablet Take 1 tablet by mouth Every Morning. 90 tablet 1   • Lumigan 0.01 % ophthalmic drops      • pregabalin (LYRICA) 150 MG capsule Take 1 capsule by mouth 2 (Two) Times a Day. 180 capsule 1   • Repatha solution prefilled syringe injection INJECT 1 ML UNDER THE SKIN INTO THE APPROPRIATE AREA AS DIRECTED EVERY 14 DAYS 2 mL 5   • [DISCONTINUED] pantoprazole (PROTONIX) 40 MG EC tablet Take 1 tablet by mouth 2 (Two) Times a Day Before Meals. 60 tablet 0   • [DISCONTINUED] sucralfate (CARAFATE) 1 g tablet Take 1 tablet by mouth 4 (Four) Times a Day Before Meals & at Bedtime. 120 tablet 0     No current facility-administered medications on file prior to visit.       Review of Systems   Constitutional: Negative for chills and fever.   HENT: Negative for trouble swallowing.    Respiratory: Negative for cough and shortness of breath.    Cardiovascular: Negative for chest pain and palpitations.   Gastrointestinal: Negative for abdominal pain, blood in stool, constipation, diarrhea, nausea, vomiting and GERD.     "    Objective   Vital Signs:   /73   Temp 97.5 °F (36.4 °C)   Ht 182.9 cm (72\")   Wt 121 kg (267 lb 9.6 oz)   BMI 36.29 kg/m²       Physical Exam  Vitals and nursing note reviewed.   Constitutional:       General: He is not in acute distress.     Appearance: Normal appearance. He is not ill-appearing.   HENT:      Head: Normocephalic and atraumatic.      Right Ear: External ear normal.      Left Ear: External ear normal.   Eyes:      General: No scleral icterus.     Conjunctiva/sclera: Conjunctivae normal.      Pupils: Pupils are equal, round, and reactive to light.   Cardiovascular:      Rate and Rhythm: Normal rate and regular rhythm.      Heart sounds: Normal heart sounds.   Pulmonary:      Effort: Pulmonary effort is normal.      Breath sounds: Normal breath sounds.   Abdominal:      General: Abdomen is flat. Bowel sounds are normal. There is no distension.      Palpations: Abdomen is soft.      Tenderness: There is no abdominal tenderness. There is no guarding or rebound.   Musculoskeletal:      Cervical back: Normal range of motion and neck supple.      Comments: Right knee incisional scar healing nicely.  Well-healed left knee incisional scar.    Skin:     General: Skin is warm and dry.   Neurological:      Mental Status: He is alert and oriented to person, place, and time.   Psychiatric:         Mood and Affect: Mood normal.         Behavior: Behavior normal.          Result Review :       Common labs    Common Labsle 12/16/21 12/16/21 12/16/21 6/27/22 6/27/22 8/3/22 8/3/22    0833 0833 0833 1359 1359 2304 2304   Glucose  111 (A)   104 (A)  113 (A)   BUN  24   41 (A)  31 (A)   Creatinine  1.42 (A)   1.77 (A)  1.42 (A)   eGFR Non  Am  48 (A)        eGFR  Am  55 (A)        Sodium  141   136  136   Potassium  4.8   5.4 (A)  4.7   Chloride  108 (A)   104  99   Calcium  9.6   9.9  9.9   Total Protein  6.0        Albumin  3.9     4.60   Total Bilirubin  0.4     0.7   Alkaline Phosphatase  " 63     75   AST (SGOT)  17     21   ALT (SGPT)  20     15   WBC 5.1   8.04  12.99 (A)    Hemoglobin 11.0 (A)   12.6 (A)  11.9 (A)    Hematocrit 32.8 (A)   36.9 (A)  33.9 (A)    Platelets 199   241  353    Total Cholesterol   280 (A)       Triglycerides   317 (A)       HDL Cholesterol   37 (A)       LDL Cholesterol    181 (A)       (A) Abnormal value       Comments are available for some flowsheets but are not being displayed.                               Assessment and Plan    Diagnoses and all orders for this visit:    1. Duodenal ulcer (Primary)  -     Hemoglobin & Hematocrit, Blood; Future    2. Acute superficial gastritis without hemorrhage    3. Duodenal ulcer without hemorrhage or perforation    Other orders  -     pantoprazole (PROTONIX) 40 MG EC tablet; Take 1 tablet by mouth 2 (Two) Times a Day Before Meals.  Dispense: 180 tablet; Refill: 1  -     sucralfate (CARAFATE) 1 g tablet; Take 1 tablet by mouth 4 (Four) Times a Day Before Meals & at Bedtime.  Dispense: 120 tablet; Refill: 0      · Update H&H.  · Continue twice daily pantoprazole and Carafate 4 times daily for total of 8 weeks.  We will back down on the pantoprazole and begin to wean off the Carafate.  · Follow-up in 2 months as they will be traveling to [place] for the winter.  They know to contact sooner if they have any questions or concerns.      Follow Up   Return in about 8 weeks (around 10/6/2022).    Dragon dictation used throughout this note.     CADEN Kumari

## 2022-08-12 ENCOUNTER — OFFICE VISIT (OUTPATIENT)
Dept: PAIN MEDICINE | Facility: CLINIC | Age: 77
End: 2022-08-12

## 2022-08-12 VITALS
HEIGHT: 72 IN | BODY MASS INDEX: 36.16 KG/M2 | OXYGEN SATURATION: 95 % | DIASTOLIC BLOOD PRESSURE: 65 MMHG | RESPIRATION RATE: 16 BRPM | WEIGHT: 267 LBS | HEART RATE: 92 BPM | TEMPERATURE: 96.5 F | SYSTOLIC BLOOD PRESSURE: 102 MMHG

## 2022-08-12 DIAGNOSIS — M96.1 POSTLAMINECTOMY SYNDROME OF LUMBAR REGION: ICD-10-CM

## 2022-08-12 DIAGNOSIS — M47.816 OSTEOARTHRITIS OF LUMBAR SPINE, UNSPECIFIED SPINAL OSTEOARTHRITIS COMPLICATION STATUS: ICD-10-CM

## 2022-08-12 DIAGNOSIS — M48.062 SPINAL STENOSIS OF LUMBAR REGION WITH NEUROGENIC CLAUDICATION: Primary | ICD-10-CM

## 2022-08-12 DIAGNOSIS — M51.26 LUMBAR DISC HERNIATION: ICD-10-CM

## 2022-08-12 DIAGNOSIS — K26.9 DUODENAL ULCER: Primary | ICD-10-CM

## 2022-08-12 LAB
HCT VFR BLD AUTO: 31.8 % (ref 37.5–51)
HGB BLD-MCNC: 10.5 G/DL (ref 13–17.7)

## 2022-08-12 PROCEDURE — 99213 OFFICE O/P EST LOW 20 MIN: CPT | Performed by: NURSE PRACTITIONER

## 2022-08-12 NOTE — PROGRESS NOTES
CHIEF COMPLAINT:  Follow up for back pain, pt states pain is the same since last visit.  Pt reports having knee replacement 7/26/22 (right)    Subjective   Isaac Suarez is a 76 y.o. male  who presents for follow-up.  He has a history of back pain.  Today his pain is 0/10VAS in severity.  He states that currently his back and right lower extremity pain is currently stable.     Completed Right TKA on 7/26/2022, was admitted with a bowel obstruction likely due to opioids. He stopped his opioids at that time.     Procedure list:  5/9/2022-left SI joint injection-50% temporary relief  12/8/2021-left SI joint injection-60% relief x1 month  9/10/2021-LESI at L2/3-50% relief  8/6/2021-LESI at L2/3-80 to 90% initial relief with ongoing 30% relief    Back Pain  This is a chronic problem. The current episode started more than 1 year ago. The problem occurs constantly. Progression since onset: currently improved. The pain is present in the lumbar spine and sacro-iliac. The quality of the pain is described as aching. The pain radiates to the right thigh. The pain is at a severity of 0/10. The pain is worse during the day. The symptoms are aggravated by bending, position, standing and twisting. Pertinent negatives include no bladder incontinence, bowel incontinence, chest pain, dysuria, fever, headaches, numbness or weakness. He has tried ice (Lyrica) for the symptoms.      PEG Assessment   What number best describes your pain on average in the past week?4  What number best describes how, during the past week, pain has interfered with your enjoyment of life?0  What number best describes how, during the past week, pain has interfered with your general activity?  8    Review of Pertinent Medical Data ---  Office visit from 6/15/2022 with ROSIO Gale reviewed.  Patient has a history of back pain with pain radiating into his right low back, and hip.  Right is worse than the left side the pain radiating into his posterior  "right leg generally stopping around the knee.  Medrol Dosepak prescribed.  Update MRI of lumbar spine and consider neurosurgical evaluation.  Follow-up in 6 weeks or sooner if needed.          The following portions of the patient's history were reviewed and updated as appropriate: allergies, current medications, past family history, past medical history, past social history, past surgical history and problem list.    Review of Systems   Constitutional: Negative for fever.   HENT: Negative for congestion.    Eyes: Negative for visual disturbance.   Respiratory: Negative for shortness of breath.    Cardiovascular: Negative for chest pain.   Gastrointestinal: Negative for bowel incontinence, constipation and diarrhea.   Genitourinary: Negative for bladder incontinence, difficulty urinating and dysuria.   Musculoskeletal: Positive for back pain and joint swelling.   Neurological: Negative for weakness, numbness and headaches.   Psychiatric/Behavioral: Negative for sleep disturbance and suicidal ideas.     --  The aforementioned information the Chief Complaint section and above subjective data including any HPI data, and also the Review of Systems data, has been personally reviewed and affirmed.  --    Vitals:    08/12/22 1104   BP: 102/65   Pulse: 92   Resp: 16   Temp: 96.5 °F (35.8 °C)   SpO2: 95%   Weight: 121 kg (267 lb)   Height: 182.9 cm (72.01\")   PainSc: 0-No pain   PainLoc: Back     Objective   Physical Exam  Vitals and nursing note reviewed.   Constitutional:       Appearance: Normal appearance. He is well-developed.   Eyes:      General: Lids are normal.   Cardiovascular:      Rate and Rhythm: Normal rate.   Pulmonary:      Effort: Pulmonary effort is normal.   Musculoskeletal:      Lumbar back: Decreased range of motion.        Legs:    Neurological:      Mental Status: He is alert and oriented to person, place, and time.      Gait: Gait abnormal (cane).   Psychiatric:         Attention and Perception: " Attention normal.         Mood and Affect: Mood normal.         Speech: Speech normal.         Behavior: Behavior normal.         Judgment: Judgment normal.       Assessment & Plan   Diagnoses and all orders for this visit:    1. Spinal stenosis of lumbar region with neurogenic claudication (Primary)    2. Osteoarthritis of lumbar spine, unspecified spinal osteoarthritis complication status    3. Postlaminectomy syndrome of lumbar region    4. Lumbar disc herniation      --- Reviewed Lumbar MRI results with patient.   --- Could consider LESI at L3-4 right of midline if pain worsens, at this point his pain is currently well controlled.    --- Educated that if he experiences and bowel/bladder incontinence, lower extremity weakness, and saddle paresthesia he needs to go to the ED. Patient states understanding.   --- Follow-up if pain fails to improve or worsens.      NEHA REPORT    As the clinician, I personally reviewed the NEHA from 8/12/2022 while the patient was in the office today.    Dictated utilizing Dragon dictation.     This document is intended for medical expert use only. Reading of this document by patients and/or patient's family without participating medical staff guidance may result in misinterpretation and unintended morbidity.   Any interpretation of such data is the responsibility of the patient and/or family member responsible for the patient in concert with their primary or specialist providers, not to be left for sources of online searches such as [employer], [employer] or similar queries. Relying on these approaches to knowledge may result in misinterpretation, misguided goals of care and even death should patients or family members try recommendations outside of the realm of professional medical care in a supervised way.    Patient remained masked during entire encounter. No cough present. I donned a mask and eye protection throughout entire visit. Prior to donning mask and eye protection, hand  hygiene was performed, as well as when it was doffed.  I was closer than 6 feet, but not for an extended period of time. No obvious exposure to any bodily fluids.

## 2022-08-17 ENCOUNTER — TELEPHONE (OUTPATIENT)
Dept: GASTROENTEROLOGY | Facility: CLINIC | Age: 77
End: 2022-08-17

## 2022-08-29 ENCOUNTER — TELEPHONE (OUTPATIENT)
Dept: GASTROENTEROLOGY | Facility: CLINIC | Age: 77
End: 2022-08-29

## 2022-08-31 ENCOUNTER — LAB (OUTPATIENT)
Dept: GASTROENTEROLOGY | Facility: CLINIC | Age: 77
End: 2022-08-31

## 2022-09-08 ENCOUNTER — TELEPHONE (OUTPATIENT)
Dept: GASTROENTEROLOGY | Facility: CLINIC | Age: 77
End: 2022-09-08

## 2022-09-08 RX ORDER — FERROUS SULFATE 325(65) MG
325 TABLET ORAL
Qty: 30 TABLET | Refills: 1 | Status: SHIPPED | OUTPATIENT
Start: 2022-09-08

## 2022-09-08 NOTE — TELEPHONE ENCOUNTER
----- Message from CADEN Kumari sent at 9/8/2022  1:15 PM EDT -----  Hemoglobin stable but low.  His iron stores are a little low.  Would recommend beginning iron supplementation; I have sent a prescription to his pharmacy. I will recheck his levels at his follow up visit.

## 2022-09-09 DIAGNOSIS — E78.5 HYPERLIPIDEMIA, UNSPECIFIED HYPERLIPIDEMIA TYPE: ICD-10-CM

## 2022-09-09 NOTE — TELEPHONE ENCOUNTER
Caller: Isaac Suarez    Relationship: Self    Best call back number:[phone]    Requested Prescriptions:   Requested Prescriptions     Pending Prescriptions Disp Refills   • Evolocumab (Repatha) solution prefilled syringe injection 2 mL 5        Pharmacy where request should be sent: [id number] [place], [place] - [address] AT [facility] [phone] [facility] [phone] FX     Additional details provided by patient: PATIENT STATES HE IS COMPLETELY OUT OF THIS MEDICATION     Does the patient have less than a 3 day supply:  [x] Yes  [] No    Dustin Sutherland Rep   09/09/22 13:39 EDT

## 2022-09-12 RX ORDER — EVOLOCUMAB 140 MG/ML
INJECTION, SOLUTION SUBCUTANEOUS
Qty: 2 ML | Refills: 5 | Status: SHIPPED | OUTPATIENT
Start: 2022-09-12 | End: 2022-12-12

## 2022-09-12 NOTE — TELEPHONE ENCOUNTER
Rx Refill Note  Requested Prescriptions     Pending Prescriptions Disp Refills   • Evolocumab (Repatha) solution prefilled syringe injection 2 mL 5      Last office visit with prescribing clinician: 7/5/2022      Next office visit with prescribing clinician: 9/15/2022            Judy Plascencia MA  09/12/22, 12:29 EDT

## 2022-09-15 ENCOUNTER — OFFICE VISIT (OUTPATIENT)
Dept: FAMILY MEDICINE CLINIC | Facility: CLINIC | Age: 77
End: 2022-09-15

## 2022-09-15 VITALS
OXYGEN SATURATION: 98 % | TEMPERATURE: 97.8 F | HEIGHT: 72 IN | SYSTOLIC BLOOD PRESSURE: 108 MMHG | WEIGHT: 276 LBS | HEART RATE: 80 BPM | BODY MASS INDEX: 37.38 KG/M2 | DIASTOLIC BLOOD PRESSURE: 72 MMHG

## 2022-09-15 DIAGNOSIS — E78.5 HYPERLIPIDEMIA, UNSPECIFIED HYPERLIPIDEMIA TYPE: ICD-10-CM

## 2022-09-15 DIAGNOSIS — I10 PRIMARY HYPERTENSION: ICD-10-CM

## 2022-09-15 DIAGNOSIS — K29.80 ACUTE DUODENITIS: ICD-10-CM

## 2022-09-15 DIAGNOSIS — K26.9 DUODENAL ULCER WITHOUT HEMORRHAGE OR PERFORATION: ICD-10-CM

## 2022-09-15 DIAGNOSIS — K29.00 ACUTE SUPERFICIAL GASTRITIS WITHOUT HEMORRHAGE: ICD-10-CM

## 2022-09-15 DIAGNOSIS — D50.0 IRON DEFICIENCY ANEMIA DUE TO CHRONIC BLOOD LOSS: Primary | ICD-10-CM

## 2022-09-15 PROCEDURE — 99214 OFFICE O/P EST MOD 30 MIN: CPT | Performed by: INTERNAL MEDICINE

## 2022-09-15 NOTE — PROGRESS NOTES
Subjective   Isaac Suarez is a 76 y.o. male.     Chief Complaint   Patient presents with   • iron deficiency       History of Present Illness   Answers for HPI/ROS submitted by the patient on 9/13/2022  What is the primary reason for your visit?: Other  Please describe your symptoms.: Review blood test that showed low iron.  Have you had these symptoms before?: No  How long have you been having these symptoms?: 1-2 weeks  Please list any medications you are currently taking for this condition.: Iron supplements    Patient with history of being hospitalized 8/4/2022 with duodenal ulcer and acute gastritis with acute duodenitis.  This happened after the TKR and was discharged on aspirin, celebrex and hydrocodone and was not eating when taking these.  Gastroenterology was consulted and he underwent an EGD and was discharged home with Protonix and Carafate.  He was noted to have some mild anemia post hospitalization with hemoglobin down to 10.5 and a repeat performed 2 weeks ago was up to 11.1.  Iron panel demonstrated iron saturations of only 14 with a low normal iron level.  He is currently taking ferrous sulfate 325 mg daily.  He self stopped the sucralfate and pantoprazole a week ago.  No pain or blood in stool.    Follow-up for hypertension.  Currently, has been feeling well and asymptomatic without any headaches, vision changes, cough, chest pain, shortness of breath, swelling, focal neurologic deficit, memory loss or syncope.  Has been taking the medications regularly and adherent with the regimen of furosemide 20 mg in the morning and lisinopril 10 mg daily.  Denies medication side effects and no significant interval events.       Follow-up for cholesterol.  Currently, has been feeling well without any myalgias, muscle aches, weakness, numbness, chest pain, short of breath or other issues.  Currently, is not on any regimen secondary to intolerance to zetia and statins and insurance denied the Nexletol 180 mg  daily and Repatha every 14 days saying will only cover if cardiology prescribed.    Is trying the Repatha again.    History of low back pain that radiates down the right leg with weakness in the right leg in the past and intermittent numbness.  Had been seen by Dr. Frias 6 years ago and has had EMG with Dr. Gomez showing abnormal study slowing severe peripheral neuropathy with significant axonal loss.  MRI on 10/9/2017 showed degenerative disc disease L4-L5 fusion.  Having difficulty walking secondary to pain and can walk only approximately 50 yards.  Was seen by pain management at [facility] pain management in distant past and underwent radiofrequency ablation which did help some.  Has not been seen for 6 years.  MRI was ordered that showed protruding disc at the L3-L4 level with a moderate central canal stenosis and some arthritis with swelling on both sides of the disc causing narrowing of the space that the nerve exits the spine on both sides.  Was then referred back to Dr. Loco pain management and [facility] medical [facility]    The following portions of the patient's history were reviewed and updated as appropriate: allergies, current medications, past family history, past medical history, past social history, past surgical history and problem list.    Depression Screen:  PHQ-2/PHQ-9 Depression Screening 4/25/2022   Retired PHQ-9 Total Score -   Retired Total Score -   Little Interest or Pleasure in Doing Things 0-->not at all   Feeling Down, Depressed or Hopeless 0-->not at all   PHQ-9: Brief Depression Severity Measure Score 0       Past Medical History:   Diagnosis Date   • Abnormal MRI, lumbar spine    • Allergic rhinitis    • Arthritis    • Bilateral hearing loss    • Borderline blood pressure    • BPH (benign prostatic hyperplasia)    • Cancer (HCC)     PROSTATE   • Chronic back pain     when walking and lying down   • Chronic kidney disease (CKD), stage I    • Cough    • Edema    • Elevated  transaminase level    • Erectile dysfunction    • External hemorrhoids    • GERD (gastroesophageal reflux disease)    • High cholesterol    • History of colon polyps    • History of depression    • History of prostate cancer     RADIATION TX   • Hyperlipidemia    • Hypertension    • Iron deficiency anemia due to chronic blood loss 9/15/2022   • Left knee pain    • Leg cramps    • Low back pain    • Lumbar stenosis    • Muscle spasm    • Prostate cancer (HCC)    • Renal cyst    • RLS (restless legs syndrome)    • Statin intolerance    • Ulcer, stomach peptic, chronic    • Vitamin D deficiency        Past Surgical History:   Procedure Laterality Date   • CATARACT EXTRACTION Right    • COLONOSCOPY N/A 08/15/2016    Procedure: COLONOSCOPY INTO CECUM WITH COLD SNARE POLYPECTOMY;  Surgeon: Loc Lee MD;  Location: [facility] ENDOSCOPY;  Service:    • ENDOSCOPY N/A 08/04/2022    Procedure: ESOPHAGOGASTRODUODENOSCOPY  WITH BIOPSIES;  Surgeon: Korin Reilly MD;  Location: [facility] ENDOSCOPY;  Service: Gastroenterology;  Laterality: N/A;  pre: abnormal imaging, anemia  post: erosive gastritis, duodenal ulcer   • HERNIA REPAIR      bilat inguinal   • INSERTION PROSTATE RADIATION SEED     • JOINT REPLACEMENT Left 01/05/2022    left knee replacement   • LUMBAR DISCECTOMY FUSION INSTRUMENTATION N/A 09/23/2016    Procedure: L 4-5 LUMBAR DISCECTOMY FUSION WITH INSTRUMENTATION;  Surgeon: Michael Frias MD;  Location: [facility] MAIN OR;  Service:    • LUMBAR EPIDURAL INJECTION N/A 08/06/2021    Procedure: LUMBAR EPIDURAL 1ST VISIT 3-4;  Surgeon: Irasema Loco MD;  Location: [facility] MAIN OR;  Service: Pain Management;  Laterality: N/A;   • LUMBAR EPIDURAL INJECTION N/A 09/10/2021    Procedure: lumbar epidural steroid injection lumbar2-3;  Surgeon: Irasema Loco MD;  Location: [facility] MAIN OR;  Service: Pain Management;  Laterality: N/A;   • PROSTATE BIOPSY      Needle biopsy   • REPLACEMENT TOTAL KNEE Right 07/26/2022      Jose G Belle   • ROTATOR CUFF REPAIR Bilateral     metal in both   • SACROILIAC JOINT INJECTION Left 2021    Procedure: SACROILIAC INJECTION-- left;  Surgeon: Irasema Loco MD;  Location: [facility] MAIN OR;  Service: Pain Management;  Laterality: Left;   • SACROILIAC JOINT INJECTION Left 2022    Procedure: SACROILIAC INJECTION--left;  Surgeon: Irasema Loco MD;  Location: [facility] MAIN OR;  Service: Pain Management;  Laterality: Left;   • TOTAL SHOULDER ARTHROPLASTY Bilateral    • VASECTOMY         Family History   Problem Relation Age of Onset   • Cancer Father         BRAIN   • Esophageal cancer Other        Social History     Socioeconomic History   • Marital status:    • Years of education: 12TH GRADE   Tobacco Use   • Smoking status: Former Smoker     Packs/day: 2.00     Years: 12.00     Pack years: 24.00     Types: Cigarettes     Quit date: 1976     Years since quittin.7   • Smokeless tobacco: Never Used   Vaping Use   • Vaping Use: Never used   Substance and Sexual Activity   • Alcohol use: Yes     Comment: SOCIALLY-Drinks 7 or less drinks per week   • Drug use: No   • Sexual activity: Yes     Partners: Female     Birth control/protection: None       Current Outpatient Medications   Medication Sig Dispense Refill   • Evolocumab (Repatha) solution prefilled syringe injection INJECT 1 ML UNDER THE SKIN INTO THE APPROPRIATE AREA AS DIRECTED EVERY 14 DAYS 2 mL 5   • ferrous sulfate (FerrouSul) 325 (65 FE) MG tablet Take 1 tablet by mouth Daily With Breakfast. 30 tablet 1   • furosemide (LASIX) 20 MG tablet Take 1 tablet by mouth Every Morning. 90 tablet 1   • levocetirizine (XYZAL) 5 MG tablet Take 5 mg by mouth Every Evening.     • lisinopril (PRINIVIL,ZESTRIL) 10 MG tablet Take 1 tablet by mouth Every Morning. 90 tablet 1   • Lumigan 0.01 % ophthalmic drops      • pregabalin (LYRICA) 150 MG capsule Take 1 capsule by mouth 2 (Two) Times a Day. 180 capsule 1   • pantoprazole (PROTONIX)  "40 MG EC tablet Take 1 tablet by mouth 2 (Two) Times a Day Before Meals. 180 tablet 1     No current facility-administered medications for this visit.       Review of Systems   Constitutional: Negative for activity change, appetite change, fatigue, fever, unexpected weight gain and unexpected weight loss.   HENT: Negative for nosebleeds, rhinorrhea, trouble swallowing and voice change.    Eyes: Negative for visual disturbance.   Respiratory: Negative for cough, chest tightness, shortness of breath and wheezing.    Cardiovascular: Negative for chest pain, palpitations and leg swelling.   Gastrointestinal: Negative for abdominal pain, blood in stool, constipation, diarrhea, nausea, vomiting, GERD and indigestion.   Genitourinary: Negative for dysuria, frequency and hematuria.   Musculoskeletal: Negative for arthralgias, back pain and myalgias.   Skin: Negative for rash and wound.   Neurological: Negative for dizziness, tremors, weakness, light-headedness, numbness, headache and memory problem.   Hematological: Negative for adenopathy. Does not bruise/bleed easily.   Psychiatric/Behavioral: Negative for sleep disturbance and depressed mood. The patient is not nervous/anxious.        Objective   /72   Pulse 80   Temp 97.8 °F (36.6 °C) (Infrared)   Ht 182.9 cm (72\")   Wt 125 kg (276 lb)   SpO2 98%   BMI 37.43 kg/m²     Physical Exam  Vitals and nursing note reviewed.   Constitutional:       General: He is not in acute distress.     Appearance: He is well-developed. He is not diaphoretic.   HENT:      Head: Normocephalic and atraumatic.      Right Ear: External ear normal.      Left Ear: External ear normal.      Nose: Nose normal.   Eyes:      Conjunctiva/sclera: Conjunctivae normal.      Pupils: Pupils are equal, round, and reactive to light.   Neck:      Thyroid: No thyromegaly.      Trachea: No tracheal deviation.   Cardiovascular:      Rate and Rhythm: Normal rate and regular rhythm.      Heart sounds: " Normal heart sounds. No murmur heard.    No friction rub. No gallop.   Pulmonary:      Effort: Pulmonary effort is normal. No respiratory distress.      Breath sounds: Normal breath sounds.   Abdominal:      General: Bowel sounds are normal.      Palpations: Abdomen is soft. There is no mass.      Tenderness: There is no abdominal tenderness. There is no guarding.   Musculoskeletal:         General: Normal range of motion.      Cervical back: Normal range of motion and neck supple.   Lymphadenopathy:      Cervical: No cervical adenopathy.   Skin:     General: Skin is warm and dry.      Capillary Refill: Capillary refill takes less than 2 seconds.      Findings: No rash.   Neurological:      Mental Status: He is alert and oriented to person, place, and time.      Motor: No abnormal muscle tone.      Deep Tendon Reflexes: Reflexes normal.   Psychiatric:         Behavior: Behavior normal.         Thought Content: Thought content normal.         Judgment: Judgment normal.         Recent Results (from the past 2016 hour(s))   Basic Metabolic Panel    Collection Time: 06/27/22  1:59 PM    Specimen: Blood   Result Value Ref Range    Glucose 104 (H) 65 - 99 mg/dL    BUN 41 (H) 8 - 23 mg/dL    Creatinine 1.77 (H) 0.76 - 1.27 mg/dL    Sodium 136 136 - 145 mmol/L    Potassium 5.4 (H) 3.5 - 5.2 mmol/L    Chloride 104 98 - 107 mmol/L    CO2 22.9 22.0 - 29.0 mmol/L    Calcium 9.9 8.6 - 10.5 mg/dL    BUN/Creatinine Ratio 23.2 7.0 - 25.0    Anion Gap 9.1 5.0 - 15.0 mmol/L    eGFR 39.6 (L) >60.0 mL/min/1.73   CBC Auto Differential    Collection Time: 06/27/22  1:59 PM    Specimen: Blood   Result Value Ref Range    WBC 8.04 3.40 - 10.80 10*3/mm3    RBC 3.96 (L) 4.14 - 5.80 10*6/mm3    Hemoglobin 12.6 (L) 13.0 - 17.7 g/dL    Hematocrit 36.9 (L) 37.5 - 51.0 %    MCV 93.2 79.0 - 97.0 fL    MCH 31.8 26.6 - 33.0 pg    MCHC 34.1 31.5 - 35.7 g/dL    RDW 13.2 12.3 - 15.4 %    RDW-SD 44.9 37.0 - 54.0 fl    MPV 8.8 6.0 - 12.0 fL    Platelets  241 140 - 450 10*3/mm3    Neutrophil % 70.3 42.7 - 76.0 %    Lymphocyte % 15.3 (L) 19.6 - 45.3 %    Monocyte % 9.3 5.0 - 12.0 %    Eosinophil % 3.2 0.3 - 6.2 %    Basophil % 0.5 0.0 - 1.5 %    Immature Grans % 1.4 (H) 0.0 - 0.5 %    Neutrophils, Absolute 5.65 1.70 - 7.00 10*3/mm3    Lymphocytes, Absolute 1.23 0.70 - 3.10 10*3/mm3    Monocytes, Absolute 0.75 0.10 - 0.90 10*3/mm3    Eosinophils, Absolute 0.26 0.00 - 0.40 10*3/mm3    Basophils, Absolute 0.04 0.00 - 0.20 10*3/mm3    Immature Grans, Absolute 0.11 (H) 0.00 - 0.05 10*3/mm3    nRBC 0.0 0.0 - 0.2 /100 WBC   ECG 12 Lead    Collection Time: 06/27/22  2:08 PM   Result Value Ref Range    QT Interval 359 ms   Duplex Venous Lower Extremity - Bilateral CAR    Collection Time: 07/31/22 11:37 AM   Result Value Ref Range    Target HR (85%) 123 bpm    Max. Pred. HR (100%) 145 bpm    Right Common Femoral Spont Y     Right Common Femoral Phasic Y     Right Common Femoral Augment Y     Right Common Femoral Competent Y     Right Common Femoral Compress C     Right Saphenofemoral Junction Compress C     Right Profunda Femoral Compress C     Right Proximal Femoral Compress C     Right Mid Femoral Spont Y     Right Mid Femoral Phasic Y     Right Mid Femoral Augment Y     Right Mid Femoral Competent Y     Right Mid Femoral Compress C     Right Distal Femoral Compress C     Right Popliteal Spont Y     Right Popliteal Phasic Y     Right Popliteal Augment Y     Right Popliteal Competent Y     Right Popliteal Compress C     Right Posterior Tibial Compress C     Right Peroneal Compress C     Right Gastronemius Compress C     Right Greater Saph AK Compress C     Right Greater Saph BK Compress C     Right Lesser Saph Compress C     Left Common Femoral Spont Y     Left Common Femoral Phasic Y     Left Common Femoral Augment Y     Left Common Femoral Competent Y     Left Common Femoral Compress C     Left Saphenofemoral Junction Compress C     Left Profunda Femoral Compress C     Left  Proximal Femoral Compress C     Left Mid Femoral Spont Y     Left Mid Femoral Phasic Y     Left Mid Femoral Augment Y     Left Mid Femoral Competent Y     Left Mid Femoral Compress C     Left Distal Femoral Compress C     Left Popliteal Spont Y     Left Popliteal Phasic Y     Left Popliteal Augment Y     Left Popliteal Competent Y     Left Popliteal Compress C     Left Posterior Tibial Compress C     Left Peroneal Compress C     Left Gastronemius Compress C     Left Greater Saph AK Compress C     Left Greater Saph BK Compress C     Left Lesser Saph Compress C    ECG 12 Lead    Collection Time: 08/03/22  9:05 PM   Result Value Ref Range    QT Interval 382 ms   Comprehensive Metabolic Panel    Collection Time: 08/03/22 11:04 PM    Specimen: Blood   Result Value Ref Range    Glucose 113 (H) 65 - 99 mg/dL    BUN 31 (H) 8 - 23 mg/dL    Creatinine 1.42 (H) 0.76 - 1.27 mg/dL    Sodium 136 136 - 145 mmol/L    Potassium 4.7 3.5 - 5.2 mmol/L    Chloride 99 98 - 107 mmol/L    CO2 21.6 (L) 22.0 - 29.0 mmol/L    Calcium 9.9 8.6 - 10.5 mg/dL    Total Protein 7.5 6.0 - 8.5 g/dL    Albumin 4.60 3.50 - 5.20 g/dL    ALT (SGPT) 15 1 - 41 U/L    AST (SGOT) 21 1 - 40 U/L    Alkaline Phosphatase 75 39 - 117 U/L    Total Bilirubin 0.7 0.0 - 1.2 mg/dL    Globulin 2.9 gm/dL    A/G Ratio 1.6 g/dL    BUN/Creatinine Ratio 21.8 7.0 - 25.0    Anion Gap 15.4 (H) 5.0 - 15.0 mmol/L    eGFR 51.5 (L) >60.0 mL/min/1.73   Troponin    Collection Time: 08/03/22 11:04 PM    Specimen: Blood   Result Value Ref Range    Troponin T <0.010 0.000 - 0.030 ng/mL   Green Top (Gel)    Collection Time: 08/03/22 11:04 PM   Result Value Ref Range    Extra Tube Hold for add-ons.    Lavender Top    Collection Time: 08/03/22 11:04 PM   Result Value Ref Range    Extra Tube hold for add-on    Gold Top - SST    Collection Time: 08/03/22 11:04 PM   Result Value Ref Range    Extra Tube Hold for add-ons.    Light Blue Top    Collection Time: 08/03/22 11:04 PM   Result Value  Ref Range    Extra Tube Hold for add-ons.    CBC Auto Differential    Collection Time: 08/03/22 11:04 PM    Specimen: Blood   Result Value Ref Range    WBC 12.99 (H) 3.40 - 10.80 10*3/mm3    RBC 3.77 (L) 4.14 - 5.80 10*6/mm3    Hemoglobin 11.9 (L) 13.0 - 17.7 g/dL    Hematocrit 33.9 (L) 37.5 - 51.0 %    MCV 89.9 79.0 - 97.0 fL    MCH 31.6 26.6 - 33.0 pg    MCHC 35.1 31.5 - 35.7 g/dL    RDW 13.1 12.3 - 15.4 %    RDW-SD 42.3 37.0 - 54.0 fl    MPV 8.7 6.0 - 12.0 fL    Platelets 353 140 - 450 10*3/mm3    Neutrophil % 78.8 (H) 42.7 - 76.0 %    Lymphocyte % 8.5 (L) 19.6 - 45.3 %    Monocyte % 5.7 5.0 - 12.0 %    Eosinophil % 2.5 0.3 - 6.2 %    Basophil % 0.3 0.0 - 1.5 %    Immature Grans % 4.2 (H) 0.0 - 0.5 %    Neutrophils, Absolute 10.24 (H) 1.70 - 7.00 10*3/mm3    Lymphocytes, Absolute 1.11 0.70 - 3.10 10*3/mm3    Monocytes, Absolute 0.74 0.10 - 0.90 10*3/mm3    Eosinophils, Absolute 0.32 0.00 - 0.40 10*3/mm3    Basophils, Absolute 0.04 0.00 - 0.20 10*3/mm3    Immature Grans, Absolute 0.54 (H) 0.00 - 0.05 10*3/mm3    nRBC 0.1 0.0 - 0.2 /100 WBC   D-dimer, Quantitative    Collection Time: 08/03/22 11:04 PM    Specimen: Blood   Result Value Ref Range    D-Dimer, Quantitative 2.44 (H) 0.00 - 0.49 MCGFEU/mL   Troponin    Collection Time: 08/04/22  2:12 AM    Specimen: Arm, Left; Blood   Result Value Ref Range    Troponin T <0.010 0.000 - 0.030 ng/mL   Lipase    Collection Time: 08/04/22  2:12 AM    Specimen: Arm, Left; Blood   Result Value Ref Range    Lipase 20 13 - 60 U/L   COVID-19,[facility] IN-HOUSE CEPHEID/NICK NP SWAB IN TRANSPORT MEDIA 8-12 HR TAT - Swab, Nasopharynx    Collection Time: 08/04/22  5:23 AM    Specimen: Nasopharynx; Swab   Result Value Ref Range    COVID19 Not Detected Not Detected - Ref. Range   Tissue Pathology Exam    Collection Time: 08/04/22 11:06 AM    Specimen: Stomach; Tissue   Result Value Ref Range    Case Report       Surgical Pathology Report                         Case: [id number]                                   Authorizing Provider:  Korin Reilly MD      Collected:           08/04/2022 11:06 AM          Ordering Location:     [facility]  Received:            08/04/2022 12:58 PM                                 ENDO SUITES                                                                  Pathologist:           Claribel Davis MD                                                    Specimen:    Stomach, random gastric bxs                                                                Final Diagnosis       1. Stomach, Random Biopsies:   A. Benign gastric mucosa with mild, nonspecific chronic inflammation.   B. Negative for Helicobacter on routine staining.   C. No metaplasia, dysplasia nor malignancy identified.    swm/jse       Gross Description       1. Stomach.  The specimen is received in formalin labeled with the patient's name and further designated 'random gastric biopsy' are multiple small fragments of gray-tan tissue. The specimen is submitted for embedding as received.         Hemoglobin & Hematocrit, Blood    Collection Time: 08/11/22  1:46 PM   Result Value Ref Range    Hemoglobin 10.5 (L) 13.0 - 17.7 g/dL    Hematocrit 31.8 (L) 37.5 - 51.0 %   CBC & Differential    Collection Time: 08/31/22  9:05 AM    Specimen: Blood   Result Value Ref Range    WBC 5.58 3.40 - 10.80 10*3/mm3    RBC 3.59 (L) 4.14 - 5.80 10*6/mm3    Hemoglobin 11.1 (L) 13.0 - 17.7 g/dL    Hematocrit 32.7 (L) 37.5 - 51.0 %    MCV 91.1 79.0 - 97.0 fL    MCH 30.9 26.6 - 33.0 pg    MCHC 33.9 31.5 - 35.7 g/dL    RDW 13.3 12.3 - 15.4 %    Platelets 236 140 - 450 10*3/mm3    Neutrophil Rel % 65.5 42.7 - 76.0 %    Lymphocyte Rel % 15.8 (L) 19.6 - 45.3 %    Monocyte Rel % 9.9 5.0 - 12.0 %    Eosinophil Rel % 6.3 (H) 0.3 - 6.2 %    Basophil Rel % 0.5 0.0 - 1.5 %    Neutrophils Absolute 3.66 1.70 - 7.00 10*3/mm3    Lymphocytes Absolute 0.88 0.70 - 3.10 10*3/mm3    Monocytes Absolute 0.55 0.10 - 0.90 10*3/mm3     Eosinophils Absolute 0.35 0.00 - 0.40 10*3/mm3    Basophils Absolute 0.03 0.00 - 0.20 10*3/mm3    Immature Granulocyte Rel % 2.0 (H) 0.0 - 0.5 %    Immature Grans Absolute 0.11 (H) 0.00 - 0.05 10*3/mm3    nRBC 0.0 0.0 - 0.2 /100 WBC   Iron Profile    Collection Time: 08/31/22  9:05 AM    Specimen: Blood   Result Value Ref Range    TIBC 478 mcg/dL    UIBC 409 (H) 112 - 346 mcg/dL    Iron 69 59 - 158 mcg/dL    Iron Saturation 14 (L) 20 - 50 %     Assessment & Plan   Diagnoses and all orders for this visit:    1. Iron deficiency anemia due to chronic blood loss (Primary)  -     CBC & Differential  -     Iron and TIBC  -     Ferritin    2. Duodenal ulcer without hemorrhage or perforation  -     CBC & Differential    3. Acute superficial gastritis without hemorrhage    4. Acute duodenitis    5. Primary hypertension  -     Comprehensive Metabolic Panel  -     Lipid Panel    6. Hyperlipidemia, unspecified hyperlipidemia type  -     Comprehensive Metabolic Panel  -     Lipid Panel    Reviewed past labs on and evaluations including EGD.  Iron deficiency anemia due to the duodenal ulcer and gastritis.  I did recommend that he restart the pantoprozole and follow up with gastroenterology in 1 month.  Avoid all NSAIDs (motirn, advil or aleve or aspirin) Will check the labs today.  Follow up with gastroenterology as scheduled.  Hypertension is controlled.  Will check all labs as will be out of town till April in [place].       · COVID-19 Precautions - Patient was compliant in wearing a mask. When I saw the patient, I used appropriate personal protective equipment (PPE) including mask and eye shield (standard procedure).  Additionally, I used gown and gloves if indicated.  Hand hygiene was completed before and after seeing the patient.  · Dictated utilizing Dragon Dictation

## 2022-09-16 LAB
ALBUMIN SERPL-MCNC: 4.3 G/DL (ref 3.5–5.2)
ALBUMIN/GLOB SERPL: 1.8 G/DL
ALP SERPL-CCNC: 75 U/L (ref 39–117)
ALT SERPL-CCNC: 23 U/L (ref 1–41)
AST SERPL-CCNC: 24 U/L (ref 1–40)
BASOPHILS # BLD AUTO: 0.03 10*3/MM3 (ref 0–0.2)
BASOPHILS NFR BLD AUTO: 0.4 % (ref 0–1.5)
BILIRUB SERPL-MCNC: 0.6 MG/DL (ref 0–1.2)
BUN SERPL-MCNC: 21 MG/DL (ref 8–23)
BUN/CREAT SERPL: 15.8 (ref 7–25)
CALCIUM SERPL-MCNC: 9.9 MG/DL (ref 8.6–10.5)
CHLORIDE SERPL-SCNC: 102 MMOL/L (ref 98–107)
CHOLEST SERPL-MCNC: 159 MG/DL (ref 0–200)
CO2 SERPL-SCNC: 26.3 MMOL/L (ref 22–29)
CREAT SERPL-MCNC: 1.33 MG/DL (ref 0.76–1.27)
EGFRCR-CYS SERPLBLD CKD-EPI 2021: 55.7 ML/MIN/1.73
EOSINOPHIL # BLD AUTO: 0.41 10*3/MM3 (ref 0–0.4)
EOSINOPHIL NFR BLD AUTO: 5.9 % (ref 0.3–6.2)
ERYTHROCYTE [DISTWIDTH] IN BLOOD BY AUTOMATED COUNT: 13.3 % (ref 12.3–15.4)
FERRITIN SERPL-MCNC: 213 NG/ML (ref 30–400)
GLOBULIN SER CALC-MCNC: 2.4 GM/DL
GLUCOSE SERPL-MCNC: 105 MG/DL (ref 65–99)
HCT VFR BLD AUTO: 34.8 % (ref 37.5–51)
HDLC SERPL-MCNC: 45 MG/DL (ref 40–60)
HGB BLD-MCNC: 11.3 G/DL (ref 13–17.7)
IMM GRANULOCYTES # BLD AUTO: 0.14 10*3/MM3 (ref 0–0.05)
IMM GRANULOCYTES NFR BLD AUTO: 2 % (ref 0–0.5)
IRON SATN MFR SERPL: 16 % (ref 20–50)
IRON SERPL-MCNC: 77 MCG/DL (ref 59–158)
LDLC SERPL CALC-MCNC: 80 MG/DL (ref 0–100)
LYMPHOCYTES # BLD AUTO: 0.95 10*3/MM3 (ref 0.7–3.1)
LYMPHOCYTES NFR BLD AUTO: 13.6 % (ref 19.6–45.3)
MCH RBC QN AUTO: 30.5 PG (ref 26.6–33)
MCHC RBC AUTO-ENTMCNC: 32.5 G/DL (ref 31.5–35.7)
MCV RBC AUTO: 94.1 FL (ref 79–97)
MONOCYTES # BLD AUTO: 0.52 10*3/MM3 (ref 0.1–0.9)
MONOCYTES NFR BLD AUTO: 7.4 % (ref 5–12)
NEUTROPHILS # BLD AUTO: 4.93 10*3/MM3 (ref 1.7–7)
NEUTROPHILS NFR BLD AUTO: 70.7 % (ref 42.7–76)
NRBC BLD AUTO-RTO: 0 /100 WBC (ref 0–0.2)
PLATELET # BLD AUTO: 271 10*3/MM3 (ref 140–450)
POTASSIUM SERPL-SCNC: 4.9 MMOL/L (ref 3.5–5.2)
PROT SERPL-MCNC: 6.7 G/DL (ref 6–8.5)
RBC # BLD AUTO: 3.7 10*6/MM3 (ref 4.14–5.8)
SODIUM SERPL-SCNC: 139 MMOL/L (ref 136–145)
TIBC SERPL-MCNC: 484 MCG/DL
TRIGL SERPL-MCNC: 201 MG/DL (ref 0–150)
UIBC SERPL-MCNC: 407 MCG/DL (ref 112–346)
VLDLC SERPL CALC-MCNC: 34 MG/DL (ref 5–40)
WBC # BLD AUTO: 6.98 10*3/MM3 (ref 3.4–10.8)

## 2022-10-06 ENCOUNTER — OFFICE VISIT (OUTPATIENT)
Dept: GASTROENTEROLOGY | Facility: CLINIC | Age: 77
End: 2022-10-06

## 2022-10-06 VITALS
HEART RATE: 81 BPM | SYSTOLIC BLOOD PRESSURE: 120 MMHG | WEIGHT: 279 LBS | DIASTOLIC BLOOD PRESSURE: 71 MMHG | BODY MASS INDEX: 37.79 KG/M2 | TEMPERATURE: 97.9 F | HEIGHT: 72 IN

## 2022-10-06 DIAGNOSIS — D50.0 IRON DEFICIENCY ANEMIA DUE TO CHRONIC BLOOD LOSS: ICD-10-CM

## 2022-10-06 DIAGNOSIS — K26.9 DUODENAL ULCER WITHOUT HEMORRHAGE OR PERFORATION: Primary | ICD-10-CM

## 2022-10-06 PROCEDURE — 99214 OFFICE O/P EST MOD 30 MIN: CPT | Performed by: PHYSICIAN ASSISTANT

## 2022-10-06 NOTE — PROGRESS NOTES
Chief Complaint  Heartburn and Duodenal ulcer    Subjective        History of Present Illness  Isaac Suarez is a  77 y.o. male here for follow-up for GERD and duodenal ulcer.  He follows with Dr. Babb and myself.  He was last seen in clinic on 8/11/2022.    His heartburn is currently well controlled with twice daily PPI.  He has been maintained on iron supplementation.  His bowels are moving well and without difficulty.  He notes they are dark in nature but not tarry secondary to the iron supplementation.  He discontinued the Carafate.  He does note a repeated injury to his right knee.  He also has chronic back issues and is taking 4 Excedrin daily.    Most recent hemoglobin 11.3 and improvement in iron studies.  Iron supplementation being managed by Dr. Moran    He will be leaving for [place] where he will stay through the winter and return in April.      Past Medical History:   Diagnosis Date   • Abnormal MRI, lumbar spine    • Allergic rhinitis    • Arthritis    • Bilateral hearing loss    • Borderline blood pressure    • BPH (benign prostatic hyperplasia)    • Cancer (HCC)     PROSTATE   • Chronic back pain     when walking and lying down   • Chronic kidney disease (CKD), stage I    • Cough    • Edema    • Elevated transaminase level    • Erectile dysfunction    • External hemorrhoids    • GERD (gastroesophageal reflux disease)    • High cholesterol    • History of colon polyps    • History of depression    • History of prostate cancer     RADIATION TX   • Hyperlipidemia    • Hypertension    • Iron deficiency anemia due to chronic blood loss 09/15/2022   • Left knee pain    • Leg cramps    • Low back pain    • Lumbar stenosis    • Muscle spasm    • Prostate cancer (HCC)    • Renal cyst    • RLS (restless legs syndrome)    • Statin intolerance    • Ulcer    • Ulcer, stomach peptic, chronic    • Vitamin D deficiency        Past Surgical History:   Procedure Laterality Date   • CATARACT EXTRACTION Right    •  COLONOSCOPY N/A 08/15/2016    Procedure: COLONOSCOPY INTO CECUM WITH COLD SNARE POLYPECTOMY;  Surgeon: Loc Lee MD;  Location: [facility] ENDOSCOPY;  Service:    • ENDOSCOPY N/A 08/04/2022    Procedure: ESOPHAGOGASTRODUODENOSCOPY  WITH BIOPSIES;  Surgeon: Korin Reilly MD;  Location: [facility] ENDOSCOPY;  Service: Gastroenterology;  Laterality: N/A;  pre: abnormal imaging, anemia  post: erosive gastritis, duodenal ulcer   • HERNIA REPAIR      bilat inguinal   • INSERTION PROSTATE RADIATION SEED     • JOINT REPLACEMENT Left 01/05/2022    left knee replacement   • LUMBAR DISCECTOMY FUSION INSTRUMENTATION N/A 09/23/2016    Procedure: L 4-5 LUMBAR DISCECTOMY FUSION WITH INSTRUMENTATION;  Surgeon: Michael Frias MD;  Location: [facility] MAIN OR;  Service:    • LUMBAR EPIDURAL INJECTION N/A 08/06/2021    Procedure: LUMBAR EPIDURAL 1ST VISIT 3-4;  Surgeon: Irasema Loco MD;  Location[facility];  Service: Pain Management;  Laterality: N/A;   • LUMBAR EPIDURAL INJECTION N/A 09/10/2021    Procedure: lumbar epidural steroid injection lumbar2-3;  Surgeon: Irasema Looc MD;  Location: [facility];  Service: Pain Management;  Laterality: N/A;   • PROSTATE BIOPSY      Needle biopsy   • REPLACEMENT TOTAL KNEE Right 07/26/2022    Dr. Jose G Belle   • ROTATOR CUFF REPAIR Bilateral     metal in both   • SACROILIAC JOINT INJECTION Left 12/08/2021    Procedure: SACROILIAC INJECTION-- left;  Surgeon: Irasema Loco MD;  Location: [facility];  Service: Pain Management;  Laterality: Left;   • SACROILIAC JOINT INJECTION Left 05/09/2022    Procedure: SACROILIAC INJECTION--left;  Surgeon: Irasema Loco MD;  Location: [facility];  Service: Pain Management;  Laterality: Left;   • TOTAL SHOULDER ARTHROPLASTY Bilateral    • VASECTOMY         Family History   Problem Relation Age of Onset   • Cancer Father         BRAIN   • Esophageal cancer Other        Social History     Socioeconomic History   • Marital status:  "   • Years of education: 12TH GRADE   Tobacco Use   • Smoking status: Former Smoker     Packs/day: 2.00     Years: 12.00     Pack years: 24.00     Types: Cigarettes     Quit date: 1976     Years since quittin.7   • Smokeless tobacco: Never Used   Vaping Use   • Vaping Use: Never used   Substance and Sexual Activity   • Alcohol use: Yes     Comment: SOCIALLY-Drinks 7 or less drinks per week   • Drug use: No   • Sexual activity: Yes     Partners: Female     Birth control/protection: None       Allergies   Allergen Reactions   • Crestor [Rosuvastatin] Myalgia   • Gabapentin Other (See Comments)     nightmares   • Penicillins Hives     HIVES   • Zetia [Ezetimibe] Myalgia   • Zocor [Simvastatin] Myalgia   • Hydrocodone-Acetaminophen Other (See Comments)     Causes nausea and vomiting, decreased appetite, couldn't stand the smell of food       Current Outpatient Medications on File Prior to Visit   Medication Sig Dispense Refill   • Evolocumab (Repatha) solution prefilled syringe injection INJECT 1 ML UNDER THE SKIN INTO THE APPROPRIATE AREA AS DIRECTED EVERY 14 DAYS 2 mL 5   • ferrous sulfate (FerrouSul) 325 (65 FE) MG tablet Take 1 tablet by mouth Daily With Breakfast. 30 tablet 1   • furosemide (LASIX) 20 MG tablet Take 1 tablet by mouth Every Morning. 90 tablet 1   • levocetirizine (XYZAL) 5 MG tablet Take 5 mg by mouth Every Evening.     • lisinopril (PRINIVIL,ZESTRIL) 10 MG tablet Take 1 tablet by mouth Every Morning. 90 tablet 1   • Lumigan 0.01 % ophthalmic drops      • pantoprazole (PROTONIX) 40 MG EC tablet Take 1 tablet by mouth 2 (Two) Times a Day Before Meals. 180 tablet 1   • pregabalin (LYRICA) 150 MG capsule Take 1 capsule by mouth 2 (Two) Times a Day. 180 capsule 1     No current facility-administered medications on file prior to visit.       Review of Systems     Objective   Vital Signs:   /71   Pulse 81   Temp 97.9 °F (36.6 °C)   Ht 182.9 cm (72\")   Wt 127 kg (279 lb)   BMI " 37.84 kg/m²       Physical Exam  Vitals and nursing note reviewed.   Constitutional:       General: He is not in acute distress.     Appearance: Normal appearance. He is obese. He is not ill-appearing.   HENT:      Head: Normocephalic and atraumatic.      Right Ear: External ear normal.      Left Ear: External ear normal.   Eyes:      General: No scleral icterus.     Conjunctiva/sclera: Conjunctivae normal.      Pupils: Pupils are equal, round, and reactive to light.   Cardiovascular:      Rate and Rhythm: Normal rate and regular rhythm.      Heart sounds: Normal heart sounds.   Pulmonary:      Effort: Pulmonary effort is normal.      Breath sounds: Normal breath sounds.   Abdominal:      General: Bowel sounds are normal. There is no distension.      Palpations: Abdomen is soft.      Tenderness: There is no abdominal tenderness. There is no guarding or rebound.   Musculoskeletal:      Cervical back: Normal range of motion and neck supple.      Comments: Right knee in soft brace  Incisional scar noted on left knee   Skin:     General: Skin is warm and dry.   Neurological:      Mental Status: He is alert and oriented to person, place, and time.   Psychiatric:         Mood and Affect: Mood normal.         Behavior: Behavior normal.          Result Review :       Common labs    Common Labs 8/11/22 8/31/22 9/15/22 9/15/22 9/15/22      0914 0914 0914   Glucose   105 (A)     BUN   21     Creatinine   1.33 (A)     Sodium   139     Potassium   4.9     Chloride   102     Calcium   9.9     Total Protein   6.7     Albumin   4.30     Total Bilirubin   0.6     Alkaline Phosphatase   75     AST (SGOT)   24     ALT (SGPT)   23     WBC  5.58   6.98   Hemoglobin 10.5 (A) 11.1 (A)   11.3 (A)   Hematocrit 31.8 (A) 32.7 (A)   34.8 (A)   Platelets  236   271   Total Cholesterol    159    Triglycerides    201 (A)    HDL Cholesterol    45    LDL Cholesterol     80    (A) Abnormal value       Comments are available for some flowsheets but  are not being displayed.                               Assessment and Plan    Diagnoses and all orders for this visit:    1. Duodenal ulcer without hemorrhage or perforation (Primary)    2. Iron deficiency anemia due to chronic blood loss      · Plan to continue 40 mg pantoprazole twice daily given his inability to come off of NSAIDs.  · Recommend keep his appointment for follow up labs with Dr. Moran.   · He will return to clinic for follow up upon his return from [place] in April. He knows to contact sooner should any concerns arise.       Follow Up   Return in about 6 months (around 4/6/2023).    Dragon dictation used throughout this note.     CADEN Kumari

## 2022-11-17 DIAGNOSIS — I10 PRIMARY HYPERTENSION: ICD-10-CM

## 2022-11-17 DIAGNOSIS — R60.9 EDEMA, UNSPECIFIED TYPE: ICD-10-CM

## 2022-11-17 DIAGNOSIS — I10 ESSENTIAL HYPERTENSION: ICD-10-CM

## 2022-11-17 RX ORDER — LISINOPRIL 10 MG/1
10 TABLET ORAL EVERY MORNING
Qty: 90 TABLET | Refills: 1 | Status: SHIPPED | OUTPATIENT
Start: 2022-11-17 | End: 2022-11-21

## 2022-11-17 RX ORDER — FUROSEMIDE 20 MG/1
20 TABLET ORAL EVERY MORNING
Qty: 90 TABLET | Refills: 1 | Status: SHIPPED | OUTPATIENT
Start: 2022-11-17

## 2022-11-19 DIAGNOSIS — I10 ESSENTIAL HYPERTENSION: ICD-10-CM

## 2022-11-19 DIAGNOSIS — I10 PRIMARY HYPERTENSION: ICD-10-CM

## 2022-11-21 RX ORDER — LISINOPRIL 10 MG/1
10 TABLET ORAL EVERY MORNING
Qty: 90 TABLET | Refills: 1 | Status: SHIPPED | OUTPATIENT
Start: 2022-11-21

## 2022-12-09 DIAGNOSIS — E78.5 HYPERLIPIDEMIA, UNSPECIFIED HYPERLIPIDEMIA TYPE: ICD-10-CM

## 2022-12-09 NOTE — TELEPHONE ENCOUNTER
Rx Refill Note  Requested Prescriptions     Pending Prescriptions Disp Refills   • Evolocumab (Repatha) solution prefilled syringe injection [Pharmacy Med Name: REPATHA 140MG/ML INJ, 1ML] 2 mL 5     Sig: INJECT 1ML UNDER THE SKIN INTO THE APPROPRIATE AREA AS DIRECTED EVERY 14 DAYS      Last office visit with prescribing clinician: 9/15/2022   Last telemedicine visit with prescribing clinician: Visit date not found   Next office visit with prescribing clinician: Visit date not found

## 2022-12-12 RX ORDER — EVOLOCUMAB 140 MG/ML
INJECTION, SOLUTION SUBCUTANEOUS
Qty: 2 ML | Refills: 5 | Status: SHIPPED | OUTPATIENT
Start: 2022-12-12

## 2022-12-21 ENCOUNTER — TELEPHONE (OUTPATIENT)
Dept: FAMILY MEDICINE CLINIC | Facility: CLINIC | Age: 77
End: 2022-12-21

## 2022-12-21 DIAGNOSIS — G60.9 IDIOPATHIC PERIPHERAL NEUROPATHY: ICD-10-CM

## 2022-12-21 RX ORDER — PREGABALIN 150 MG/1
150 CAPSULE ORAL 2 TIMES DAILY
Qty: 180 CAPSULE | Refills: 1 | Status: CANCELLED | OUTPATIENT
Start: 2022-12-21

## 2022-12-21 RX ORDER — PREGABALIN 150 MG/1
150 CAPSULE ORAL 2 TIMES DAILY
Qty: 180 CAPSULE | Refills: 1 | Status: SHIPPED | OUTPATIENT
Start: 2022-12-21 | End: 2022-12-30 | Stop reason: SDUPTHER

## 2022-12-21 NOTE — TELEPHONE ENCOUNTER
Rx Refill Note  Requested Prescriptions     Pending Prescriptions Disp Refills   • pregabalin (LYRICA) 150 MG capsule 180 capsule 1     Sig: Take 1 capsule by mouth 2 (Two) Times a Day.      Last office visit with prescribing clinician: 9/15/2022   Last telemedicine visit with prescribing clinician: Visit date not found   Next office visit with prescribing clinician: Visit date not found  LAST REFILL 07/05/2022    PATIENT NEEDS A 3 MONTH FOLLOW UP

## 2022-12-21 NOTE — TELEPHONE ENCOUNTER
Caller: Isaac Suarez    Relationship: Self    Best call back number: [phone]    Requested Prescriptions:   Requested Prescriptions     Pending Prescriptions Disp Refills   • pregabalin (LYRICA) 150 MG capsule 180 capsule 1     Sig: Take 1 capsule by mouth 2 (Two) Times a Day.        Pharmacy where request should be sent: [facility] #[id number] [facility] [address] AT [place] [address] & [facility] [phone] [facility] [phone] FX     Additional details provided by patient:     Does the patient have less than a 3 day supply:  [] Yes  [x] No    Would you like a call back once the refill request has been completed: [x] Yes [] No    If the office needs to give you a call back, can they leave a voicemail: [x] Yes [] No    Dustin Kim Rep   12/21/22 10:14 EST

## 2022-12-27 DIAGNOSIS — G60.9 IDIOPATHIC PERIPHERAL NEUROPATHY: ICD-10-CM

## 2022-12-27 RX ORDER — PREGABALIN 150 MG/1
150 CAPSULE ORAL 2 TIMES DAILY
Qty: 180 CAPSULE | Refills: 1 | Status: CANCELLED | OUTPATIENT
Start: 2022-12-27

## 2022-12-27 NOTE — TELEPHONE ENCOUNTER
Caller: Daniela Isaac W    Relationship: Self    Best call back number: [phone]    Requested Prescriptions:   Requested Prescriptions     Pending Prescriptions Disp Refills   • pregabalin (LYRICA) 150 MG capsule 180 capsule 1     Sig: Take 1 capsule by mouth 2 (Two) Times a Day.        Pharmacy where request should be sent:   [facility] #[id number] - [facility] [address] AT [place] [address] & [facility] [phone] [facility] [phone] FX     Additional details provided by patient:   A REFILL WAS SENT TO THE [facility] IN [place] INSTEAD OF THE ONE IN [place]. WOULD LIKE A PRESCRIPTION SENT TO THE [facility] IN [place].     Does the patient have less than a 3 day supply:  [] Yes  [x] No    Would you like a call back once the refill request has been completed: [x] Yes [] No    If the office needs to give you a call back, can they leave a voicemail: [x] Yes [] No    Dustin Kelley Rep   12/27/22 11:51 EST

## 2022-12-30 DIAGNOSIS — G60.9 IDIOPATHIC PERIPHERAL NEUROPATHY: ICD-10-CM

## 2022-12-30 NOTE — TELEPHONE ENCOUNTER
Caller: Isaac Suarez ANABEL    Relationship: Self    Best call back number: [phone]    Requested Prescriptions:   Requested Prescriptions     Pending Prescriptions Disp Refills   • pregabalin (LYRICA) 150 MG capsule 180 capsule 1     Sig: Take 1 capsule by mouth 2 (Two) Times a Day.        Pharmacy where request should be sent: [facility] #[id number] [address] AT [facility] OF  [address] & [facility] [phone] [facility] [phone] FX     Additional details provided by patient: RECENTLY ORDERED FROM [facility] IN [place], [place] BUT PATIENT IS IN [place].      PLEASE RESEND TO PHARMACY ABOVE AS [facility] CANNOT TRANSFER CONTROLLED MEDICATION PRESCRIPTION TO [place] PER PATIENT.    Does the patient have less than a 3 day supply:  [x] Yes  [x] No    Would you like a call back once the refill request has been completed: [x] Yes [] No    If the office needs to give you a call back, can they leave a voicemail: [x] Yes [] No    Dustin Willis Rep   12/30/22 13:38 EST

## 2022-12-30 NOTE — TELEPHONE ENCOUNTER
Rx Refill Note  Requested Prescriptions     Pending Prescriptions Disp Refills   • pregabalin (LYRICA) 150 MG capsule 180 capsule 1     Sig: Take 1 capsule by mouth 2 (Two) Times a Day.      Last office visit with prescribing clinician: 9/15/2022   Last telemedicine visit with prescribing clinician: Visit date not found   Next office visit with prescribing clinician: Visit date not found                         Would you like a call back once the refill request has been completed: [] Yes [] No    If the office needs to give you a call back, can they leave a voicemail: [] Yes [] No    Jasiel Vang, MARGUERITE/LMR  12/30/22, 14:08 EST

## 2023-01-03 RX ORDER — PREGABALIN 150 MG/1
150 CAPSULE ORAL 2 TIMES DAILY
Qty: 180 CAPSULE | Refills: 0 | Status: SHIPPED | OUTPATIENT
Start: 2023-01-03

## 2023-01-26 ENCOUNTER — TELEPHONE (OUTPATIENT)
Dept: FAMILY MEDICINE CLINIC | Facility: CLINIC | Age: 78
End: 2023-01-26
Payer: MEDICARE

## 2023-01-26 NOTE — TELEPHONE ENCOUNTER
Called patient about surgery clearance and patient is in [place] and will do his clearance at [facility] in [place]. They will be contacting him to get that scheduled.

## 2023-02-09 ENCOUNTER — TELEPHONE (OUTPATIENT)
Dept: FAMILY MEDICINE CLINIC | Facility: CLINIC | Age: 78
End: 2023-02-09
Payer: MEDICARE

## 2023-02-09 NOTE — TELEPHONE ENCOUNTER
Patient had all his pre-op testing at [facility] on 1/31/2023, he is needing you to call [phone] to get his records & surgery clearance form so that Dr Moran can sigh off on his knee surgery which is on 3/3/23

## 2023-02-09 NOTE — TELEPHONE ENCOUNTER
They called to see if you had received the paperwork yet.  Please call [phone] and let them know.

## 2023-04-05 DIAGNOSIS — G60.9 IDIOPATHIC PERIPHERAL NEUROPATHY: ICD-10-CM

## 2023-04-05 RX ORDER — PREGABALIN 150 MG/1
150 CAPSULE ORAL 2 TIMES DAILY
Qty: 180 CAPSULE | Refills: 0 | OUTPATIENT
Start: 2023-04-05

## 2023-04-05 NOTE — TELEPHONE ENCOUNTER
Caller: Yoni Suarezapryl LITTLE    Relationship: Self    Best call back number: [phone]    Requested Prescriptions:   Requested Prescriptions     Pending Prescriptions Disp Refills   • pregabalin (LYRICA) 150 MG capsule 180 capsule 0     Sig: Take 1 capsule by mouth 2 (Two) Times a Day.        Pharmacy where request should be sent: [facility] - [facility] [address] [facility] [phone] [facility] [phone]      Last office visit with prescribing clinician: 9/15/2022   Last telemedicine visit with prescribing clinician: Visit date not found   Next office visit with prescribing clinician: Visit date not found     Additional details provided by patient: WILL NEED NEW PRESCRIPTION    Does the patient have less than a 3 day supply:  [] Yes  [x] No    Would you like a call back once the refill request has been completed: [] Yes [x] No    If the office needs to give you a call back, can they leave a voicemail: [] Yes [x] No    Dustin Rivas Rep   04/05/23 09:37 EDT

## 2023-05-14 DIAGNOSIS — R60.9 EDEMA, UNSPECIFIED TYPE: ICD-10-CM

## 2023-05-14 DIAGNOSIS — I10 ESSENTIAL HYPERTENSION: ICD-10-CM

## 2023-05-14 DIAGNOSIS — I10 PRIMARY HYPERTENSION: ICD-10-CM

## 2023-05-15 RX ORDER — FUROSEMIDE 20 MG/1
20 TABLET ORAL EVERY MORNING
Qty: 90 TABLET | Refills: 1 | Status: SHIPPED | OUTPATIENT
Start: 2023-05-15

## 2023-06-12 DIAGNOSIS — E78.5 HYPERLIPIDEMIA, UNSPECIFIED HYPERLIPIDEMIA TYPE: ICD-10-CM

## 2023-06-12 RX ORDER — EVOLOCUMAB 140 MG/ML
INJECTION, SOLUTION SUBCUTANEOUS
Qty: 2 ML | Refills: 5 | Status: SHIPPED | OUTPATIENT
Start: 2023-06-12

## 2023-07-24 DIAGNOSIS — G60.9 IDIOPATHIC PERIPHERAL NEUROPATHY: ICD-10-CM

## 2023-07-24 RX ORDER — PREGABALIN 150 MG/1
150 CAPSULE ORAL 2 TIMES DAILY
Qty: 60 CAPSULE | Refills: 2 | Status: SHIPPED | OUTPATIENT
Start: 2023-07-24

## 2023-07-24 NOTE — TELEPHONE ENCOUNTER
PATIENT CALLED IN REGARDS TO MEDICATION    pregabalin (LYRICA) 150 MG capsule   A PRESCRIPTION WAS SENT TO PHARMACY FOR 30 TABLETS HE TAKES 2 A DAY, TWO REFILLS.  THIS WILL ONLY HOLD HIM OVER FOR 15 EACH PRESCRIPTION. HE HAS AN APPOINTMENT WITH DR. LAMAS ON 9/25/23 . HE IS REQUESTING A REFILL TO HOLD HIM OVER UNTIL HIS APPOINTMENT.       [facility] ([facility] - [place], [place] - [address] [phone] [facility] [phone]  [phone]     CALL BACK NUMBER [phone]

## 2023-07-24 NOTE — TELEPHONE ENCOUNTER
LOV-7/20/23  LF-7/20/23    Quantity on prescription was fixed for a 30 day with refills. Please advise.

## 2023-09-25 ENCOUNTER — OFFICE VISIT (OUTPATIENT)
Dept: FAMILY MEDICINE CLINIC | Facility: CLINIC | Age: 78
End: 2023-09-25

## 2023-09-25 VITALS
DIASTOLIC BLOOD PRESSURE: 66 MMHG | HEIGHT: 72 IN | HEART RATE: 71 BPM | TEMPERATURE: 97.7 F | OXYGEN SATURATION: 96 % | BODY MASS INDEX: 36.44 KG/M2 | SYSTOLIC BLOOD PRESSURE: 108 MMHG | WEIGHT: 269 LBS

## 2023-09-25 DIAGNOSIS — I10 ESSENTIAL HYPERTENSION: ICD-10-CM

## 2023-09-25 DIAGNOSIS — E55.9 VITAMIN D DEFICIENCY: ICD-10-CM

## 2023-09-25 DIAGNOSIS — N18.31 STAGE 3A CHRONIC KIDNEY DISEASE: ICD-10-CM

## 2023-09-25 DIAGNOSIS — G60.9 IDIOPATHIC PERIPHERAL NEUROPATHY: ICD-10-CM

## 2023-09-25 DIAGNOSIS — D50.0 IRON DEFICIENCY ANEMIA DUE TO CHRONIC BLOOD LOSS: ICD-10-CM

## 2023-09-25 DIAGNOSIS — I10 PRIMARY HYPERTENSION: Primary | ICD-10-CM

## 2023-09-25 DIAGNOSIS — E78.5 HYPERLIPIDEMIA, UNSPECIFIED HYPERLIPIDEMIA TYPE: ICD-10-CM

## 2023-09-25 RX ORDER — EVOLOCUMAB 140 MG/ML
INJECTION, SOLUTION SUBCUTANEOUS
Qty: 2 ML | Refills: 5 | Status: SHIPPED | OUTPATIENT
Start: 2023-09-25

## 2023-09-25 RX ORDER — LISINOPRIL 10 MG/1
10 TABLET ORAL EVERY MORNING
Qty: 90 TABLET | Refills: 1 | Status: SHIPPED | OUTPATIENT
Start: 2023-09-25

## 2023-09-25 RX ORDER — GUSELKUMAB 100 MG/ML
INJECTION SUBCUTANEOUS
COMMUNITY
Start: 2023-09-13

## 2023-09-25 RX ORDER — PREGABALIN 150 MG/1
150 CAPSULE ORAL 2 TIMES DAILY
Qty: 180 CAPSULE | Refills: 1 | Status: SHIPPED | OUTPATIENT
Start: 2023-09-25

## 2023-09-25 RX ORDER — FUROSEMIDE 20 MG/1
20 TABLET ORAL EVERY MORNING
Qty: 90 TABLET | Refills: 1 | Status: SHIPPED | OUTPATIENT
Start: 2023-09-25

## 2023-09-25 NOTE — PROGRESS NOTES
Subjective   Isaac Suarez is a 78 y.o. male.     Chief Complaint   Patient presents with    Hypertension    Hyperlipidemia       History of Present Illness   Follow-up for hypertension.  Currently, has been feeling well and asymptomatic without any headaches, vision changes, cough, chest pain, shortness of breath, swelling, focal neurologic deficit, memory loss or syncope.  Has been taking the medications regularly and adherent with the regimen of furosemide 20 mg in the morning and lisinopril 10 mg daily.  Denies medication side effects and no significant interval events.       Follow-up for cholesterol.  Currently, has been feeling well without any myalgias, muscle aches, weakness, numbness, chest pain, short of breath or other issues.  Currently, is not on any regimen secondary to intolerance to zetia and statins and insurance denied the Nexletol 180 mg daily and Repatha every 14 days saying will only cover if cardiology prescribed.    Is trying the Repatha again.     History of low back pain that radiates down the right leg with weakness in the right leg in the past and intermittent numbness.  Had been seen by Dr. Frias 6 years ago and has had EMG with Dr. Gomez showing abnormal study slowing severe peripheral neuropathy with significant axonal loss.  MRI on 10/9/2017 showed degenerative disc disease L4-L5 fusion.  Having difficulty walking secondary to pain and can walk only approximately 50 yards.  Was seen by pain management at [facility] pain management in distant past and underwent radiofrequency ablation which did help some.  Has not been seen for 6 years.  MRI was ordered that showed protruding disc at the L3-L4 level with a moderate central canal stenosis and some arthritis with swelling on both sides of the disc causing narrowing of the space that the nerve exits the spine on both sides.  Was then referred back to Dr. Loco pain management and [facility] medical group     Patient with history of  anemia secondary to acute gastritis and acute duodenitis on 8/4/2022.  Currently is off all stomach medicines.    The following portions of the patient's history were reviewed and updated as appropriate: allergies, current medications, past family history, past medical history, past social history, past surgical history and problem list.    Depression Screen:      7/20/2023     8:15 AM   PHQ-2/PHQ-9 Depression Screening   Little Interest or Pleasure in Doing Things 0-->not at all   Feeling Down, Depressed or Hopeless 0-->not at all   PHQ-9: Brief Depression Severity Measure Score 0       Past Medical History:   Diagnosis Date    Abnormal MRI, lumbar spine     Allergic rhinitis     Arthritis     Bilateral hearing loss     Borderline blood pressure     BPH (benign prostatic hyperplasia)     Cancer     PROSTATE    Chronic back pain     when walking and lying down    Chronic kidney disease (CKD), stage I     Cough     Edema     Elevated transaminase level     Erectile dysfunction     External hemorrhoids     GERD (gastroesophageal reflux disease)     High cholesterol     History of colon polyps     History of depression     History of prostate cancer     RADIATION TX    Hyperlipidemia     Hypertension     Iron deficiency anemia due to chronic blood loss 09/15/2022    Left knee pain     Leg cramps     Low back pain     Lumbar stenosis     Muscle spasm     Prostate cancer     Renal cyst     RLS (restless legs syndrome)     Statin intolerance     Ulcer     Ulcer, stomach peptic, chronic     Vitamin D deficiency        Past Surgical History:   Procedure Laterality Date    CATARACT EXTRACTION Right     COLONOSCOPY N/A 08/15/2016    Procedure: COLONOSCOPY INTO CECUM WITH COLD SNARE POLYPECTOMY;  Surgeon: Loc Lee MD;  Location: [facility] ENDOSCOPY;  Service:     ENDOSCOPY N/A 08/04/2022    Procedure: ESOPHAGOGASTRODUODENOSCOPY  WITH BIOPSIES;  Surgeon: Korin Reilly MD;  Location: [facility] ENDOSCOPY;  Service:  Gastroenterology;  Laterality: N/A;  pre: abnormal imaging, anemia  post: erosive gastritis, duodenal ulcer    HERNIA REPAIR      bilat inguinal    INSERTION PROSTATE RADIATION SEED      JOINT REPLACEMENT Left 2022    left knee replacement    LUMBAR DISCECTOMY FUSION INSTRUMENTATION N/A 2016    Procedure: L 4-5 LUMBAR DISCECTOMY FUSION WITH INSTRUMENTATION;  Surgeon: Michael Frias MD;  Location: [facility] MAIN OR;  Service:     LUMBAR EPIDURAL INJECTION N/A 2021    Procedure: LUMBAR EPIDURAL 1ST VISIT 3-4;  Surgeon: Irasema Loco MD;  [place] [facility] [place];  Service: Pain Management;  Laterality: N/A;    LUMBAR EPIDURAL INJECTION N/A 09/10/2021    Procedure: lumbar epidural steroid injection lumbar2-3;  Surgeon: Irasema Loco MD;  Location: [facility] OR;  Service: Pain Management;  Laterality: N/A;    PROSTATE BIOPSY      Needle biopsy    REPLACEMENT TOTAL KNEE Right 2022    Dr. Jose G Belle    ROTATOR CUFF REPAIR Bilateral     metal in both    SACROILIAC JOINT INJECTION Left 2021    Procedure: SACROILIAC INJECTION-- left;  Surgeon: Irasema Loco MD;  Location: [facility]IN OR;  Service: Pain Management;  Laterality: Left;    SACROILIAC JOINT INJECTION Left 2022    Procedure: SACROILIAC INJECTION--left;  Surgeon: Irasema Loco MD;  Location: [facility]IN OR;  Service: Pain Management;  Laterality: Left;    TOTAL SHOULDER ARTHROPLASTY Bilateral     VASECTOMY         Family History   Problem Relation Age of Onset    Cancer Father         BRAIN    Esophageal cancer Other        Social History     Socioeconomic History    Marital status:     Years of education: 12TH GRADE   Tobacco Use    Smoking status: Former     Packs/day: 2.00     Years: 12.00     Pack years: 24.00     Types: Cigarettes     Quit date: 1976     Years since quittin.7    Smokeless tobacco: Never   Vaping Use    Vaping Use: Never used   Substance and Sexual Activity    Alcohol use: Yes      Comment: SOCIALLY-Drinks 7 or less drinks per week    Drug use: No    Sexual activity: Yes     Partners: Female     Birth control/protection: None       Current Outpatient Medications   Medication Sig Dispense Refill    clobetasol (TEMOVATE) 0.05 % cream       Evolocumab (Repatha) solution prefilled syringe injection INJECT 1 ML UNDER THE SKIN INTO THE APPROPRIATE AREA AS DIRECTED EVERY 14 DAYS 2 mL 5    furosemide (LASIX) 20 MG tablet Take 1 tablet by mouth Every Morning. 90 tablet 1    levocetirizine (XYZAL) 5 MG tablet Take 1 tablet by mouth Every Evening.      lisinopril (PRINIVIL,ZESTRIL) 10 MG tablet Take 1 tablet by mouth Every Morning. 90 tablet 1    Lumigan 0.01 % ophthalmic drops       meloxicam (MOBIC) 15 MG tablet Take 1 tablet by mouth Daily. 30 tablet 0    pregabalin (LYRICA) 150 MG capsule Take 1 capsule by mouth 2 (Two) Times a Day. 180 capsule 1    Tremfya 100 MG/ML solution pen-injector        No current facility-administered medications for this visit.       Review of Systems   Constitutional:  Negative for activity change, appetite change, fatigue, fever, unexpected weight gain and unexpected weight loss.   HENT:  Negative for nosebleeds, rhinorrhea, trouble swallowing and voice change.    Eyes:  Negative for visual disturbance.   Respiratory:  Negative for cough, chest tightness, shortness of breath and wheezing.    Cardiovascular:  Negative for chest pain, palpitations and leg swelling.   Gastrointestinal:  Negative for abdominal pain, blood in stool, constipation, diarrhea, nausea, vomiting, GERD and indigestion.   Genitourinary:  Negative for dysuria, frequency and hematuria.   Musculoskeletal:  Negative for arthralgias, back pain and myalgias.   Skin:  Negative for rash and wound.   Neurological:  Negative for dizziness, tremors, weakness, light-headedness, numbness, headache and memory problem.   Hematological:  Negative for adenopathy. Does not bruise/bleed easily.  "  Psychiatric/Behavioral:  Negative for sleep disturbance and depressed mood. The patient is not nervous/anxious.      Objective   /66 (BP Location: Left arm, Patient Position: Sitting, Cuff Size: Large Adult)   Pulse 71   Temp 97.7 °F (36.5 °C) (Temporal)   Ht 182.9 cm (72.01\")   Wt 122 kg (269 lb)   SpO2 96%   BMI 36.47 kg/m²     Physical Exam  Vitals and nursing note reviewed.   Constitutional:       General: He is not in acute distress.     Appearance: He is well-developed. He is obese. He is not diaphoretic.   HENT:      Head: Normocephalic and atraumatic.      Right Ear: External ear normal.      Left Ear: External ear normal.      Nose: Nose normal.   Eyes:      Conjunctiva/sclera: Conjunctivae normal.      Pupils: Pupils are equal, round, and reactive to light.   Neck:      Thyroid: No thyromegaly.      Trachea: No tracheal deviation.   Cardiovascular:      Rate and Rhythm: Normal rate and regular rhythm.      Heart sounds: Normal heart sounds. No murmur heard.    No friction rub. No gallop.   Pulmonary:      Effort: Pulmonary effort is normal. No respiratory distress.      Breath sounds: Normal breath sounds.   Abdominal:      General: Bowel sounds are normal.      Palpations: Abdomen is soft. There is no mass.      Tenderness: There is no abdominal tenderness. There is no guarding.   Musculoskeletal:         General: Normal range of motion.      Cervical back: Normal range of motion and neck supple.   Lymphadenopathy:      Cervical: No cervical adenopathy.   Skin:     General: Skin is warm and dry.      Capillary Refill: Capillary refill takes less than 2 seconds.      Findings: No rash.   Neurological:      Mental Status: He is alert and oriented to person, place, and time.      Motor: No abnormal muscle tone.      Deep Tendon Reflexes: Reflexes normal.   Psychiatric:         Behavior: Behavior normal.         Thought Content: Thought content normal.         Judgment: Judgment normal. "       Recent Results (from the past 2016 hour(s))   CBC w AUTO Differential    Collection Time: 07/20/23  8:43 AM    Specimen: Blood   Result Value Ref Range    WBC 6.03 3.40 - 10.80 10*3/mm3    RBC 4.11 (L) 4.14 - 5.80 10*6/mm3    Hemoglobin 12.4 (L) 13.0 - 17.7 g/dL    Hematocrit 36.8 (L) 37.5 - 51.0 %    MCV 89.5 79.0 - 97.0 fL    MCH 30.2 26.6 - 33.0 pg    MCHC 33.7 31.5 - 35.7 g/dL    RDW 14.2 12.3 - 15.4 %    Platelets 228 140 - 450 10*3/mm3    Neutrophil Rel % 66.0 42.7 - 76.0 %    Lymphocyte Rel % 18.2 (L) 19.6 - 45.3 %    Monocyte Rel % 8.5 5.0 - 12.0 %    Eosinophil Rel % 5.6 0.3 - 6.2 %    Basophil Rel % 0.5 0.0 - 1.5 %    Neutrophils Absolute 3.98 1.70 - 7.00 10*3/mm3    Lymphocytes Absolute 1.10 0.70 - 3.10 10*3/mm3    Monocytes Absolute 0.51 0.10 - 0.90 10*3/mm3    Eosinophils Absolute 0.34 0.00 - 0.40 10*3/mm3    Basophils Absolute 0.03 0.00 - 0.20 10*3/mm3    Immature Granulocyte Rel % 1.2 (H) 0.0 - 0.5 %    Immature Grans Absolute 0.07 (H) 0.00 - 0.05 10*3/mm3    nRBC 0.0 0.0 - 0.2 /100 WBC   Comprehensive metabolic panel    Collection Time: 07/20/23  8:43 AM    Specimen: Blood   Result Value Ref Range    Glucose 112 (H) 65 - 99 mg/dL    BUN 35 (H) 8 - 23 mg/dL    Creatinine 1.67 (H) 0.76 - 1.27 mg/dL    EGFR Result 42.2 (L) >60.0 mL/min/1.73    BUN/Creatinine Ratio 21.0 7.0 - 25.0    Sodium 138 136 - 145 mmol/L    Potassium 5.0 3.5 - 5.2 mmol/L    Chloride 105 98 - 107 mmol/L    Total CO2 22.2 22.0 - 29.0 mmol/L    Calcium 10.3 8.6 - 10.5 mg/dL    Total Protein 6.8 6.0 - 8.5 g/dL    Albumin 4.6 3.5 - 5.2 g/dL    Globulin 2.2 gm/dL    A/G Ratio 2.1 g/dL    Total Bilirubin 0.4 0.0 - 1.2 mg/dL    Alkaline Phosphatase 69 39 - 117 U/L    AST (SGOT) 33 1 - 40 U/L    ALT (SGPT) 43 (H) 1 - 41 U/L     Assessment & Plan   Diagnoses and all orders for this visit:    1. Primary hypertension (Primary)  -     CBC & Differential  -     lisinopril (PRINIVIL,ZESTRIL) 10 MG tablet; Take 1 tablet by mouth Every  Morning.  Dispense: 90 tablet; Refill: 1    2. Hyperlipidemia, unspecified hyperlipidemia type  -     Lipid Panel  -     Evolocumab (Repatha) solution prefilled syringe injection; INJECT 1 ML UNDER THE SKIN INTO THE APPROPRIATE AREA AS DIRECTED EVERY 14 DAYS  Dispense: 2 mL; Refill: 5    3. Stage 3a chronic kidney disease  -     Basic Metabolic Panel    4. Iron deficiency anemia due to chronic blood loss  -     Lipid Panel    5. Vitamin D deficiency  -     Vitamin D,25-Hydroxy    6. Essential hypertension  -     lisinopril (PRINIVIL,ZESTRIL) 10 MG tablet; Take 1 tablet by mouth Every Morning.  Dispense: 90 tablet; Refill: 1    7. Idiopathic peripheral neuropathy  -     pregabalin (LYRICA) 150 MG capsule; Take 1 capsule by mouth 2 (Two) Times a Day.  Dispense: 180 capsule; Refill: 1    Other orders  -     furosemide (LASIX) 20 MG tablet; Take 1 tablet by mouth Every Morning.  Dispense: 90 tablet; Refill: 1      Continue the current medications and no changes at this time.  Check the labs as ordered and adjust the medications if needed based on the results.         COVID-19 Precautions - Patient was compliant in wearing a mask. When I saw the patient, I used appropriate personal protective equipment (PPE) including mask and eye shield (standard procedure).  Additionally, I used gown and gloves if indicated.  Hand hygiene was completed before and after seeing the patient.  Dictated utilizing Dragon Dictation

## 2023-09-26 DIAGNOSIS — N18.32 STAGE 3B CHRONIC KIDNEY DISEASE: Primary | ICD-10-CM

## 2023-09-26 LAB
25(OH)D3+25(OH)D2 SERPL-MCNC: 39.4 NG/ML (ref 30–100)
BASOPHILS # BLD AUTO: 0.05 10*3/MM3 (ref 0–0.2)
BASOPHILS NFR BLD AUTO: 0.8 % (ref 0–1.5)
BUN SERPL-MCNC: 42 MG/DL (ref 8–23)
BUN/CREAT SERPL: 23.3 (ref 7–25)
CALCIUM SERPL-MCNC: 10.1 MG/DL (ref 8.6–10.5)
CHLORIDE SERPL-SCNC: 102 MMOL/L (ref 98–107)
CHOLEST SERPL-MCNC: 180 MG/DL (ref 0–200)
CO2 SERPL-SCNC: 22.4 MMOL/L (ref 22–29)
CREAT SERPL-MCNC: 1.8 MG/DL (ref 0.76–1.27)
EGFRCR SERPLBLD CKD-EPI 2021: 38.3 ML/MIN/1.73
EOSINOPHIL # BLD AUTO: 0.31 10*3/MM3 (ref 0–0.4)
EOSINOPHIL NFR BLD AUTO: 5.1 % (ref 0.3–6.2)
ERYTHROCYTE [DISTWIDTH] IN BLOOD BY AUTOMATED COUNT: 13.5 % (ref 12.3–15.4)
GLUCOSE SERPL-MCNC: 121 MG/DL (ref 65–99)
HCT VFR BLD AUTO: 36.1 % (ref 37.5–51)
HDLC SERPL-MCNC: 49 MG/DL (ref 40–60)
HGB BLD-MCNC: 12 G/DL (ref 13–17.7)
IMM GRANULOCYTES # BLD AUTO: 0.16 10*3/MM3 (ref 0–0.05)
IMM GRANULOCYTES NFR BLD AUTO: 2.6 % (ref 0–0.5)
LDLC SERPL CALC-MCNC: 111 MG/DL (ref 0–100)
LYMPHOCYTES # BLD AUTO: 0.93 10*3/MM3 (ref 0.7–3.1)
LYMPHOCYTES NFR BLD AUTO: 15.3 % (ref 19.6–45.3)
MCH RBC QN AUTO: 30.7 PG (ref 26.6–33)
MCHC RBC AUTO-ENTMCNC: 33.2 G/DL (ref 31.5–35.7)
MCV RBC AUTO: 92.3 FL (ref 79–97)
MONOCYTES # BLD AUTO: 0.7 10*3/MM3 (ref 0.1–0.9)
MONOCYTES NFR BLD AUTO: 11.5 % (ref 5–12)
NEUTROPHILS # BLD AUTO: 3.93 10*3/MM3 (ref 1.7–7)
NEUTROPHILS NFR BLD AUTO: 64.7 % (ref 42.7–76)
NRBC BLD AUTO-RTO: 0 /100 WBC (ref 0–0.2)
PLATELET # BLD AUTO: 271 10*3/MM3 (ref 140–450)
POTASSIUM SERPL-SCNC: 4.8 MMOL/L (ref 3.5–5.2)
RBC # BLD AUTO: 3.91 10*6/MM3 (ref 4.14–5.8)
SODIUM SERPL-SCNC: 137 MMOL/L (ref 136–145)
TRIGL SERPL-MCNC: 110 MG/DL (ref 0–150)
VLDLC SERPL CALC-MCNC: 20 MG/DL (ref 5–40)
WBC # BLD AUTO: 6.08 10*3/MM3 (ref 3.4–10.8)

## 2023-10-06 ENCOUNTER — TELEPHONE (OUTPATIENT)
Dept: FAMILY MEDICINE CLINIC | Facility: CLINIC | Age: 78
End: 2023-10-06

## 2023-10-06 NOTE — TELEPHONE ENCOUNTER
Caller: Isaac Suarez    Relationship: Self    Best call back number: [phone]     What is the medical concern/diagnosis: KIDNEY FUNCTION    What specialty or service is being requested: KIDNEY SPECIALIST     What is the provider, practice or medical service name: [facility]    What is the office location: [address] [place] [address] [address], [place] [address]    What is the office phone number: [phone]    Any additional details: PATIENT IS ABOUT TO LEAVE FOR [place] AND WILL BE GONE FOR 6 MONTHS. PATIENT STATES THAT [facility] IS NEEDING A REFERRAL & OFFICE NOTES. PLEASE FAX: [phone]

## 2023-10-09 DIAGNOSIS — N18.32 STAGE 3B CHRONIC KIDNEY DISEASE: Primary | ICD-10-CM

## 2024-02-11 DIAGNOSIS — E78.5 HYPERLIPIDEMIA, UNSPECIFIED HYPERLIPIDEMIA TYPE: ICD-10-CM

## 2024-02-12 RX ORDER — EVOLOCUMAB 140 MG/ML
INJECTION, SOLUTION SUBCUTANEOUS
Qty: 6 ML | Refills: 3 | Status: SHIPPED | OUTPATIENT
Start: 2024-02-12

## 2024-02-23 DIAGNOSIS — I10 ESSENTIAL HYPERTENSION: ICD-10-CM

## 2024-02-23 DIAGNOSIS — I10 PRIMARY HYPERTENSION: ICD-10-CM

## 2024-02-26 RX ORDER — LISINOPRIL 10 MG/1
10 TABLET ORAL EVERY MORNING
Qty: 90 TABLET | Refills: 1 | Status: SHIPPED | OUTPATIENT
Start: 2024-02-26

## 2024-02-26 NOTE — TELEPHONE ENCOUNTER
LOV             9/25/2023   NOV            5/30/2024   Last RF       9/25/23  Protocol       Met    JENNIFER Davidson/ISMAEL

## 2024-02-29 DIAGNOSIS — E78.5 HYPERLIPIDEMIA, UNSPECIFIED HYPERLIPIDEMIA TYPE: ICD-10-CM

## 2024-02-29 RX ORDER — EVOLOCUMAB 140 MG/ML
INJECTION, SOLUTION SUBCUTANEOUS
Qty: 6 ML | Refills: 3 | Status: SHIPPED | OUTPATIENT
Start: 2024-02-29

## 2024-02-29 NOTE — TELEPHONE ENCOUNTER
PATIENT CALLED FOR MEDICATION REFILL FOR     Evolocumab (Repatha) solution prefilled syringe injection   HE HAS ONE SHOT LEFT NO REFILLS LEFT  HE IS IN [place] UNTIL APRIL  3 MONTH SUPPLY    \[facility]\"" Home Delivery - [address] - [phone] [facility] [phone]  [phone]     NEXT APPOINTMENT 5/30/24    CALL BACK NUMBER [phone]

## 2024-03-14 RX ORDER — SODIUM ZIRCONIUM CYCLOSILICATE 5 G/5G
POWDER, FOR SUSPENSION ORAL
COMMUNITY
Start: 2024-03-11

## 2024-03-14 RX ORDER — FUROSEMIDE 20 MG/1
20 TABLET ORAL EVERY MORNING
Qty: 90 TABLET | Refills: 3 | Status: SHIPPED | OUTPATIENT
Start: 2024-03-14

## 2024-04-01 DIAGNOSIS — G60.9 IDIOPATHIC PERIPHERAL NEUROPATHY: ICD-10-CM

## 2024-04-01 RX ORDER — PREGABALIN 150 MG/1
150 CAPSULE ORAL 2 TIMES DAILY
Qty: 180 CAPSULE | Refills: 0 | Status: SHIPPED | OUTPATIENT
Start: 2024-04-01

## 2024-05-30 ENCOUNTER — OFFICE VISIT (OUTPATIENT)
Dept: FAMILY MEDICINE CLINIC | Facility: CLINIC | Age: 79
End: 2024-05-30
Payer: MEDICARE

## 2024-05-30 VITALS
TEMPERATURE: 97.7 F | SYSTOLIC BLOOD PRESSURE: 110 MMHG | HEART RATE: 44 BPM | WEIGHT: 286 LBS | OXYGEN SATURATION: 98 % | HEIGHT: 72 IN | BODY MASS INDEX: 38.74 KG/M2 | DIASTOLIC BLOOD PRESSURE: 64 MMHG

## 2024-05-30 DIAGNOSIS — D50.0 IRON DEFICIENCY ANEMIA DUE TO CHRONIC BLOOD LOSS: ICD-10-CM

## 2024-05-30 DIAGNOSIS — E78.5 HYPERLIPIDEMIA, UNSPECIFIED HYPERLIPIDEMIA TYPE: ICD-10-CM

## 2024-05-30 DIAGNOSIS — Z51.81 ENCOUNTER FOR MEDICATION MONITORING: ICD-10-CM

## 2024-05-30 DIAGNOSIS — N18.31 STAGE 3A CHRONIC KIDNEY DISEASE: ICD-10-CM

## 2024-05-30 DIAGNOSIS — I10 PRIMARY HYPERTENSION: Primary | ICD-10-CM

## 2024-05-30 DIAGNOSIS — G60.9 IDIOPATHIC PERIPHERAL NEUROPATHY: ICD-10-CM

## 2024-05-30 PROBLEM — M51.26 DISPLACEMENT OF LUMBAR INTERVERTEBRAL DISC WITHOUT MYELOPATHY: Status: ACTIVE | Noted: 2024-01-10

## 2024-05-30 PROBLEM — M96.1 LUMBAR POST-LAMINECTOMY SYNDROME: Status: ACTIVE | Noted: 2024-01-10

## 2024-05-30 PROBLEM — M48.061 DEGENERATIVE LUMBAR SPINAL STENOSIS: Status: ACTIVE | Noted: 2024-01-10

## 2024-05-30 PROBLEM — Z96.89 S/P INSERTION OF SPINAL CORD STIMULATOR: Status: ACTIVE | Noted: 2024-05-30

## 2024-05-30 PROCEDURE — 3078F DIAST BP <80 MM HG: CPT | Performed by: INTERNAL MEDICINE

## 2024-05-30 PROCEDURE — 1160F RVW MEDS BY RX/DR IN RCRD: CPT | Performed by: INTERNAL MEDICINE

## 2024-05-30 PROCEDURE — 1126F AMNT PAIN NOTED NONE PRSNT: CPT | Performed by: INTERNAL MEDICINE

## 2024-05-30 PROCEDURE — 99214 OFFICE O/P EST MOD 30 MIN: CPT | Performed by: INTERNAL MEDICINE

## 2024-05-30 PROCEDURE — 3074F SYST BP LT 130 MM HG: CPT | Performed by: INTERNAL MEDICINE

## 2024-05-30 PROCEDURE — 1159F MED LIST DOCD IN RCRD: CPT | Performed by: INTERNAL MEDICINE

## 2024-05-30 NOTE — PROGRESS NOTES
Subjective   Isaac Suarez is a 78 y.o. male.     Chief Complaint   Patient presents with    Hypertension    Hyperlipidemia       History of Present Illness   Follow-up for hypertension.  Currently, has been feeling well and asymptomatic without any headaches, vision changes, cough, chest pain, shortness of breath, swelling, focal neurologic deficit, memory loss or syncope.  Has been taking the medications regularly and adherent with the regimen of furosemide 20 mg in the morning and lisinopril 10 mg daily.  Denies medication side effects and no significant interval events.       Follow-up for cholesterol.  Currently, has been feeling well without any myalgias, muscle aches, weakness, numbness, chest pain, short of breath or other issues.  Currently, is not on any regimen secondary to intolerance to zetia and statins and insurance denied the Nexletol 180 mg daily and Repatha every 14 days saying will only cover if cardiology prescribed.    Is trying the Repatha again.     History of low back pain that radiates down the right leg with weakness in the right leg in the past and intermittent numbness.  Had been seen by Dr. Frias 6 years ago and has had EMG with Dr. Gomez showing abnormal study slowing severe peripheral neuropathy with significant axonal loss.  MRI on 10/9/2017 showed degenerative disc disease L4-L5 fusion.  Having difficulty walking secondary to pain and can walk only approximately 50 yards.  Was seen by pain management at [facility] pain management in distant past and underwent radiofrequency ablation which did help some.  Has not been seen for 6 years.  MRI was ordered that showed protruding disc at the L3-L4 level with a moderate central canal stenosis and some arthritis with swelling on both sides of the disc causing narrowing of the space that the nerve exits the spine on both sides.  Had spine stimulator placed 4/2023 and has had significant improvement.     Patient with history of anemia  secondary to acute gastritis and acute duodenitis on 8/4/2022.  Currently is off all stomach medicines.  Last CBC on 9/25/2023 demonstrated hemoglobin of 12.0 and hematocrit of 36.1       The following portions of the patient's history were reviewed and updated as appropriate: allergies, current medications, past family history, past medical history, past social history, past surgical history and problem list.    Depression Screen:      7/20/2023     8:15 AM   PHQ-2/PHQ-9 Depression Screening   Little Interest or Pleasure in Doing Things 0-->not at all   Feeling Down, Depressed or Hopeless 0-->not at all   PHQ-9: Brief Depression Severity Measure Score 0       Past Medical History:   Diagnosis Date    Abnormal MRI, lumbar spine     Allergic rhinitis     Arthritis     Bilateral hearing loss     Borderline blood pressure     BPH (benign prostatic hyperplasia)     Cancer     PROSTATE    Chronic back pain     when walking and lying down    Chronic kidney disease (CKD), stage I     Cough     Edema     Elevated transaminase level     Erectile dysfunction     External hemorrhoids     GERD (gastroesophageal reflux disease)     High cholesterol     History of colon polyps     History of depression     History of prostate cancer     RADIATION TX    Hyperlipidemia     Hypertension     Iron deficiency anemia due to chronic blood loss 09/15/2022    Left knee pain     Leg cramps     Low back pain     Lumbar stenosis     Muscle spasm     Prostate cancer     Renal cyst     RLS (restless legs syndrome)     Statin intolerance     Ulcer     Ulcer, stomach peptic, chronic     Vitamin D deficiency        Past Surgical History:   Procedure Laterality Date    CATARACT EXTRACTION Right     COLONOSCOPY N/A 08/15/2016    Procedure: COLONOSCOPY INTO CECUM WITH COLD SNARE POLYPECTOMY;  Surgeon: Loc Lee MD;  Location: [facility] ENDOSCOPY;  Service:     ENDOSCOPY N/A 08/04/2022    Procedure: ESOPHAGOGASTRODUODENOSCOPY  WITH BIOPSIES;   Surgeon: Korin Reilly MD;  Location: [facility] ENDOSCOPY;  Service: Gastroenterology;  Laterality: N/A;  pre: abnormal imaging, anemia  post: erosive gastritis, duodenal ulcer    HERNIA REPAIR      bilat inguinal    INSERTION PROSTATE RADIATION SEED      JOINT REPLACEMENT Left 01/05/2022    left knee replacement    LUMBAR DISCECTOMY FUSION INSTRUMENTATION N/A 09/23/2016    Procedure: L 4-5 LUMBAR DISCECTOMY FUSION WITH INSTRUMENTATION;  Surgeon: Michael Frias MD;  Location: [facility] MAIN OR;  Service:     LUMBAR EPIDURAL INJECTION N/A 08/06/2021    Procedure: LUMBAR EPIDURAL 1ST VISIT 3-4;  Surgeon: Irasema Loco MD;  Location: [facility] MAIN [facility];  Service: Pain Management;  Laterality: N/A;    LUMBAR EPIDURAL INJECTION N/A 09/10/2021    Procedure: lumbar epidural steroid injection lumbar2-3;  Surgeon: Irasema Loco MD;  Location: [facility]IN [facility];  Service: Pain Management;  Laterality: N/A;    PROSTATE BIOPSY      Needle biopsy    REPLACEMENT TOTAL KNEE Right 07/26/2022    Dr. Jose G Belle    ROTATOR CUFF REPAIR Bilateral     metal in both    SACROILIAC JOINT INJECTION Left 12/08/2021    Procedure: SACROILIAC INJECTION-- left;  Surgeon: Irasema Loco MD;  Location: [facility];  Service: Pain Management;  Laterality: Left;    SACROILIAC JOINT INJECTION Left 05/09/2022    Procedure: SACROILIAC INJECTION--left;  Surgeon: Irasema Loco MD;  Location: [facility];  Service: Pain Management;  Laterality: Left;    SPINAL CORD STIMULATOR IMPLANT  04/2024    NERVO-HFX SENSA spinal cord stimulator    TOTAL SHOULDER ARTHROPLASTY Bilateral     VASECTOMY         Family History   Problem Relation Age of Onset    Cancer Father         BRAIN    Esophageal cancer Other        Social History     Socioeconomic History    Marital status:     Years of education: 12TH GRADE   Tobacco Use    Smoking status: Former     Current packs/day: 0.00     Average packs/day: 2.0 packs/day for 12.0 years (24.0 ttl pk-yrs)      Types: Cigarettes     Start date: 1964     Quit date: 1976     Years since quittin.4    Smokeless tobacco: Never   Vaping Use    Vaping status: Never Used   Substance and Sexual Activity    Alcohol use: Yes     Comment: SOCIALLY-Drinks 7 or less drinks per week    Drug use: No    Sexual activity: Yes     Partners: Female     Birth control/protection: None       Current Outpatient Medications   Medication Sig Dispense Refill    Evolocumab (Repatha) solution prefilled syringe injection INJECT 140MG SUBCUTANEOUSLY  EVERY 2 WEEKS AS DIRECTED 6 mL 3    furosemide (LASIX) 20 MG tablet TAKE 1 TABLET BY MOUTH EVERY  MORNING 90 tablet 3    levocetirizine (XYZAL) 5 MG tablet Take 1 tablet by mouth Every Evening.      lisinopril (PRINIVIL,ZESTRIL) 10 MG tablet TAKE 1 TABLET BY MOUTH EVERY  MORNING 90 tablet 1    Lokelma 5 g pack       Lumigan 0.01 % ophthalmic drops       pregabalin (LYRICA) 150 MG capsule TAKE 1 CAPSULE BY MOUTH TWICE  DAILY 180 capsule 0    Tremfya 100 MG/ML solution pen-injector        No current facility-administered medications for this visit.       Review of Systems   Constitutional:  Negative for activity change, appetite change, fatigue, fever, unexpected weight gain and unexpected weight loss.   HENT:  Negative for nosebleeds, rhinorrhea, trouble swallowing and voice change.    Eyes:  Negative for visual disturbance.   Respiratory:  Negative for cough, chest tightness, shortness of breath and wheezing.    Cardiovascular:  Positive for leg swelling. Negative for chest pain and palpitations.   Gastrointestinal:  Negative for abdominal pain, blood in stool, constipation, diarrhea, nausea, vomiting, GERD and indigestion.   Genitourinary:  Negative for dysuria, frequency and hematuria.   Musculoskeletal:  Positive for back pain. Negative for arthralgias and myalgias.   Skin:  Negative for rash and wound.   Neurological:  Negative for dizziness, tremors, weakness, light-headedness, numbness,  "headache and memory problem.   Hematological:  Negative for adenopathy. Does not bruise/bleed easily.   Psychiatric/Behavioral:  Negative for sleep disturbance and depressed mood. The patient is not nervous/anxious.        Objective   /64 (BP Location: Left arm, Patient Position: Sitting, Cuff Size: Large Adult)   Pulse (!) 44   Temp 97.7 °F (36.5 °C) (Temporal)   Ht 182.9 cm (72.01\")   Wt 130 kg (286 lb)   SpO2 98%   BMI 38.78 kg/m²     Physical Exam  Vitals and nursing note reviewed.   Constitutional:       General: He is not in acute distress.     Appearance: He is well-developed. He is not diaphoretic.   HENT:      Head: Normocephalic and atraumatic.      Right Ear: External ear normal.      Left Ear: External ear normal.      Nose: Nose normal.   Eyes:      Conjunctiva/sclera: Conjunctivae normal.      Pupils: Pupils are equal, round, and reactive to light.   Neck:      Thyroid: No thyromegaly.      Trachea: No tracheal deviation.   Cardiovascular:      Rate and Rhythm: Normal rate and regular rhythm.      Heart sounds: Normal heart sounds. No murmur heard.     No friction rub. No gallop.   Pulmonary:      Effort: Pulmonary effort is normal. No respiratory distress.      Breath sounds: Normal breath sounds.   Abdominal:      General: Bowel sounds are normal.      Palpations: Abdomen is soft. There is no mass.      Tenderness: There is no abdominal tenderness. There is no guarding.   Musculoskeletal:         General: Normal range of motion.      Cervical back: Normal range of motion and neck supple.      Right lower leg: Edema present.      Left lower leg: Edema present.      Comments: Chronic left >right leg edema   Lymphadenopathy:      Cervical: No cervical adenopathy.   Skin:     General: Skin is warm and dry.      Capillary Refill: Capillary refill takes less than 2 seconds.      Findings: No rash.   Neurological:      Mental Status: He is alert and oriented to person, place, and time.      " Motor: No abnormal muscle tone.      Deep Tendon Reflexes: Reflexes normal.   Psychiatric:         Behavior: Behavior normal.         Thought Content: Thought content normal.         Judgment: Judgment normal.         No results found for this or any previous visit (from the past 2016 hour(s)).  Assessment & Plan   Diagnoses and all orders for this visit:    1. Primary hypertension (Primary)  -     Comprehensive Metabolic Panel  -     Lipid Panel    2. Hyperlipidemia, unspecified hyperlipidemia type  -     Comprehensive Metabolic Panel  -     Lipid Panel    3. Encounter for medication monitoring  -     Compliance Drug Analysis, Ur - Urine, Clean Catch    4. Stage 3a chronic kidney disease  -     Comprehensive Metabolic Panel    5. Iron deficiency anemia due to chronic blood loss  -     CBC & Differential    6. Idiopathic peripheral neuropathy  -     Compliance Drug Analysis, Ur - Urine, Clean Catch      Hypertension is well-controlled.  Continue current medication check labs as noted above noted to have a stage IIIa chronic kidney disease we will continue to monitor with the blood test.  He does have history of peripheral neuropathy and is currently utilizing The pregabalin.  He is due for compliance drug screen analysis which was ordered today.  Patient to follow-up in 6 months or sooner problems.         COVID-19 Precautions - Patient was compliant in wearing a mask. When I saw the patient, I used appropriate personal protective equipment (PPE) including mask and eye shield (standard procedure).  Additionally, I used gown and gloves if indicated.  Hand hygiene was completed before and after seeing the patient.  Dictated utilizing Dragon Dictation

## 2024-05-31 LAB
ALBUMIN SERPL-MCNC: 4.5 G/DL (ref 3.8–4.8)
ALBUMIN/GLOB SERPL: 2 {RATIO} (ref 1.2–2.2)
ALP SERPL-CCNC: 67 IU/L (ref 44–121)
ALT SERPL-CCNC: 47 IU/L (ref 0–44)
AST SERPL-CCNC: 47 IU/L (ref 0–40)
BASOPHILS # BLD AUTO: 0 X10E3/UL (ref 0–0.2)
BASOPHILS NFR BLD AUTO: 1 %
BILIRUB SERPL-MCNC: 0.6 MG/DL (ref 0–1.2)
BUN SERPL-MCNC: 31 MG/DL (ref 8–27)
BUN/CREAT SERPL: 16 (ref 10–24)
CALCIUM SERPL-MCNC: 9.8 MG/DL (ref 8.6–10.2)
CHLORIDE SERPL-SCNC: 102 MMOL/L (ref 96–106)
CHOLEST SERPL-MCNC: 171 MG/DL (ref 100–199)
CO2 SERPL-SCNC: 22 MMOL/L (ref 20–29)
CREAT SERPL-MCNC: 1.97 MG/DL (ref 0.76–1.27)
EGFRCR SERPLBLD CKD-EPI 2021: 34 ML/MIN/1.73
EOSINOPHIL # BLD AUTO: 0.3 X10E3/UL (ref 0–0.4)
EOSINOPHIL NFR BLD AUTO: 7 %
ERYTHROCYTE [DISTWIDTH] IN BLOOD BY AUTOMATED COUNT: 13 % (ref 11.6–15.4)
GLOBULIN SER CALC-MCNC: 2.3 G/DL (ref 1.5–4.5)
GLUCOSE SERPL-MCNC: 96 MG/DL (ref 70–99)
HCT VFR BLD AUTO: 37.5 % (ref 37.5–51)
HDLC SERPL-MCNC: 42 MG/DL
HGB BLD-MCNC: 12.3 G/DL (ref 13–17.7)
IMM GRANULOCYTES # BLD AUTO: 0 X10E3/UL (ref 0–0.1)
IMM GRANULOCYTES NFR BLD AUTO: 1 %
LDLC SERPL CALC-MCNC: 88 MG/DL (ref 0–99)
LYMPHOCYTES # BLD AUTO: 1.1 X10E3/UL (ref 0.7–3.1)
LYMPHOCYTES NFR BLD AUTO: 22 %
MCH RBC QN AUTO: 30.9 PG (ref 26.6–33)
MCHC RBC AUTO-ENTMCNC: 32.8 G/DL (ref 31.5–35.7)
MCV RBC AUTO: 94 FL (ref 79–97)
MONOCYTES # BLD AUTO: 0.5 X10E3/UL (ref 0.1–0.9)
MONOCYTES NFR BLD AUTO: 10 %
NEUTROPHILS # BLD AUTO: 2.8 X10E3/UL (ref 1.4–7)
NEUTROPHILS NFR BLD AUTO: 59 %
PLATELET # BLD AUTO: 233 X10E3/UL (ref 150–450)
POTASSIUM SERPL-SCNC: 4.9 MMOL/L (ref 3.5–5.2)
PROT SERPL-MCNC: 6.8 G/DL (ref 6–8.5)
RBC # BLD AUTO: 3.98 X10E6/UL (ref 4.14–5.8)
SODIUM SERPL-SCNC: 138 MMOL/L (ref 134–144)
TRIGL SERPL-MCNC: 243 MG/DL (ref 0–149)
VLDLC SERPL CALC-MCNC: 41 MG/DL (ref 5–40)
WBC # BLD AUTO: 4.8 X10E3/UL (ref 3.4–10.8)

## 2024-06-08 LAB — DRUGS UR: NORMAL

## 2024-07-08 ENCOUNTER — TELEPHONE (OUTPATIENT)
Dept: FAMILY MEDICINE CLINIC | Facility: CLINIC | Age: 79
End: 2024-07-08
Payer: MEDICARE

## 2024-07-08 DIAGNOSIS — G60.9 IDIOPATHIC PERIPHERAL NEUROPATHY: ICD-10-CM

## 2024-07-08 DIAGNOSIS — N18.31 STAGE 3A CHRONIC KIDNEY DISEASE: Primary | ICD-10-CM

## 2024-07-08 RX ORDER — PREGABALIN 150 MG/1
150 CAPSULE ORAL 2 TIMES DAILY
Qty: 180 CAPSULE | Refills: 0 | Status: SHIPPED | OUTPATIENT
Start: 2024-07-08 | End: 2024-07-08 | Stop reason: SDUPTHER

## 2024-07-08 RX ORDER — PREGABALIN 150 MG/1
150 CAPSULE ORAL 2 TIMES DAILY
Qty: 180 CAPSULE | Refills: 0 | Status: SHIPPED | OUTPATIENT
Start: 2024-07-08

## 2024-07-08 NOTE — TELEPHONE ENCOUNTER
Caller: Isaac Suarez    Relationship: Self    Best call back number: [phone]     What is the medical concern/diagnosis: FROM PREVIOUS LAB WORK 05/30/24    What specialty or service is being requested: NEPHROLOGIST    Any additional details: PATIENT STATES HE WOULD LIKE TO BE REFERRED TO SOMEONE IN THE [facility]

## 2024-07-08 NOTE — TELEPHONE ENCOUNTER
Caller: Isaac Suarez    Relationship: Self    Best call back number: [phone]     Requested Prescriptions:   Requested Prescriptions     Pending Prescriptions Disp Refills    pregabalin (LYRICA) 150 MG capsule 180 capsule 0     Sig: Take 1 capsule by mouth 2 (Two) Times a Day.        Pharmacy where request should be sent: [address] [phone] [facility] [phone]      Last office visit with prescribing clinician: 5/30/2024   Last telemedicine visit with prescribing clinician: Visit date not found   Next office visit with prescribing clinician: Visit date not found         Does the patient have less than a 3 day supply:  [] Yes  [x] No

## 2024-07-08 NOTE — TELEPHONE ENCOUNTER
I called and spoke with the patient and let him know that a referral for nephrology was placed. I let him know to give it about a week and a half and if he had not hear from anyone then he should give us a call to get scheduled. He verbalized understanding and said that he has a nephrologist in [place] but thought he needed one here. I let him know I was unsure of how that worked but that If there were any problems someone should give him a call. He verbalized understanding and will call back if needed.

## 2024-07-11 DIAGNOSIS — I10 PRIMARY HYPERTENSION: ICD-10-CM

## 2024-07-11 DIAGNOSIS — I10 ESSENTIAL HYPERTENSION: ICD-10-CM

## 2024-07-12 RX ORDER — LISINOPRIL 10 MG/1
10 TABLET ORAL EVERY MORNING
Qty: 90 TABLET | Refills: 1 | Status: SHIPPED | OUTPATIENT
Start: 2024-07-12

## 2024-09-03 ENCOUNTER — PATIENT ROUNDING (BHMG ONLY) (OUTPATIENT)
Dept: URGENT CARE | Facility: CLINIC | Age: 79
End: 2024-09-03
Payer: MEDICARE

## 2024-09-03 NOTE — ED NOTES
Patient withdrawn this morning  Did not eat his breakfast  He is minimal in conversation  Rates his depression and anxiety both 10/10  Denies pain  Needed prompting to take medications  Will continue monitoring  Thank you for letting us care for you during your recent visit to [facility]. We would love to hear about your experience with us. Was this the first time you have visited our location?    We’re always looking for ways to make our patients’ experiences even better. Do you have any recommendations on ways we may improve?    I appreciate you taking the time to respond. Please be on the lookout for a survey about your recent visit from [employer] via text or email. We would greatly appreciate if you could fill that out and turn it back in. We want your voice to be heard and we value your feedback.     Thank you for choosing [facility] for your healthcare needs.

## 2024-09-25 ENCOUNTER — TELEPHONE (OUTPATIENT)
Dept: FAMILY MEDICINE CLINIC | Facility: CLINIC | Age: 79
End: 2024-09-25
Payer: MEDICARE

## 2024-09-25 DIAGNOSIS — N18.31 STAGE 3A CHRONIC KIDNEY DISEASE: Primary | ICD-10-CM

## 2024-10-07 ENCOUNTER — OFFICE VISIT (OUTPATIENT)
Dept: FAMILY MEDICINE CLINIC | Facility: CLINIC | Age: 79
End: 2024-10-07
Payer: MEDICARE

## 2024-10-07 VITALS
HEART RATE: 63 BPM | SYSTOLIC BLOOD PRESSURE: 110 MMHG | BODY MASS INDEX: 40.09 KG/M2 | HEIGHT: 72 IN | DIASTOLIC BLOOD PRESSURE: 60 MMHG | OXYGEN SATURATION: 96 % | WEIGHT: 296 LBS | TEMPERATURE: 97.1 F

## 2024-10-07 DIAGNOSIS — Z23 IMMUNIZATION DUE: ICD-10-CM

## 2024-10-07 DIAGNOSIS — Z00.00 MEDICARE ANNUAL WELLNESS VISIT, SUBSEQUENT: Primary | ICD-10-CM

## 2024-10-07 DIAGNOSIS — G60.9 IDIOPATHIC PERIPHERAL NEUROPATHY: ICD-10-CM

## 2024-10-07 DIAGNOSIS — E78.5 HYPERLIPIDEMIA, UNSPECIFIED HYPERLIPIDEMIA TYPE: ICD-10-CM

## 2024-10-07 DIAGNOSIS — N18.32 STAGE 3B CHRONIC KIDNEY DISEASE: ICD-10-CM

## 2024-10-07 PROBLEM — N18.9 ANEMIA IN CHRONIC KIDNEY DISEASE: Chronic | Status: ACTIVE | Noted: 2024-08-22

## 2024-10-07 PROBLEM — D63.1 ANEMIA IN CHRONIC KIDNEY DISEASE: Chronic | Status: ACTIVE | Noted: 2024-08-22

## 2024-10-07 PROBLEM — E79.0 HYPERURICEMIA: Status: ACTIVE | Noted: 2024-10-03

## 2024-10-07 PROCEDURE — 1159F MED LIST DOCD IN RCRD: CPT | Performed by: INTERNAL MEDICINE

## 2024-10-07 PROCEDURE — 1170F FXNL STATUS ASSESSED: CPT | Performed by: INTERNAL MEDICINE

## 2024-10-07 PROCEDURE — 1126F AMNT PAIN NOTED NONE PRSNT: CPT | Performed by: INTERNAL MEDICINE

## 2024-10-07 PROCEDURE — G0008 ADMIN INFLUENZA VIRUS VAC: HCPCS | Performed by: INTERNAL MEDICINE

## 2024-10-07 PROCEDURE — 3074F SYST BP LT 130 MM HG: CPT | Performed by: INTERNAL MEDICINE

## 2024-10-07 PROCEDURE — 3078F DIAST BP <80 MM HG: CPT | Performed by: INTERNAL MEDICINE

## 2024-10-07 PROCEDURE — 90662 IIV NO PRSV INCREASED AG IM: CPT | Performed by: INTERNAL MEDICINE

## 2024-10-07 PROCEDURE — 1160F RVW MEDS BY RX/DR IN RCRD: CPT | Performed by: INTERNAL MEDICINE

## 2024-10-07 PROCEDURE — G0439 PPPS, SUBSEQ VISIT: HCPCS | Performed by: INTERNAL MEDICINE

## 2024-10-07 RX ORDER — EVOLOCUMAB 140 MG/ML
INJECTION, SOLUTION SUBCUTANEOUS
Qty: 6 ML | Refills: 3 | Status: SHIPPED | OUTPATIENT
Start: 2024-10-07

## 2024-10-07 RX ORDER — ALLOPURINOL 100 MG/1
1 TABLET ORAL DAILY
COMMUNITY
Start: 2024-10-03 | End: 2025-10-03

## 2024-10-07 RX ORDER — PREGABALIN 150 MG/1
150 CAPSULE ORAL 2 TIMES DAILY
Qty: 180 CAPSULE | Refills: 1 | Status: SHIPPED | OUTPATIENT
Start: 2024-10-07

## 2024-10-07 NOTE — PATIENT INSTRUCTIONS
Medicare Wellness  Personal Prevention Plan of Service     Date of Office Visit:    Encounter Provider:  Guillermo Moran MD  Place of Service:  [facility] PRIMARY CARE  Patient Name: Isaac Suarez  :  1945    As part of the Medicare Wellness portion of your visit today, we are providing you with this personalized preventive plan of services (PPPS). This plan is based upon recommendations of the [place] Preventive Services Task Force (USPSTF) and the Advisory Committee on Immunization Practices (ACIP).    This lists the preventive care services that should be considered, and provides dates of when you are due. Items listed as completed are up-to-date and do not require any further intervention.    Health Maintenance   Topic Date Due    TDAP/TD VACCINES (1 - Tdap) Never done    ZOSTER VACCINE (1 of 2) 1996    RSV Vaccine - Adults (1 - 1-dose 60+ series) Never done    INFLUENZA VACCINE  2024    COVID-19 Vaccine (5 - -24 season) 10/07/2025 (Originally 2024)    ANNUAL WELLNESS VISIT  10/07/2025    BMI FOLLOWUP  10/07/2025    COLORECTAL CANCER SCREENING  08/15/2026    HEPATITIS C SCREENING  Completed    Pneumococcal Vaccine 65+  Completed    LIPID PANEL  Discontinued    LUNG CANCER SCREENING  Discontinued       Orders Placed This Encounter   Procedures    Fluzone High-Dose 65+yrs ([id number])       Return in about 6 months (around 2025) for Next scheduled follow up.

## 2024-10-07 NOTE — PROGRESS NOTES
Subjective   The ABCs of the Annual Wellness Visit  Medicare Wellness Visit      Isaac Suarez is a 79 y.o. patient who presents for a Medicare Wellness Visit.  Follow-up for hypertension.  Currently, has been feeling well and asymptomatic without any headaches, vision changes, cough, chest pain, shortness of breath, swelling, focal neurologic deficit, memory loss or syncope.  Has been taking the medications regularly and adherent with the regimen of furosemide 20 mg in the morning and lisinopril 10 mg daily.  Denies medication side effects and no significant interval events.       Follow-up for cholesterol.  Currently, has been feeling well without any myalgias, muscle aches, weakness, numbness, chest pain, short of breath or other issues.  Currently, is not on any regimen secondary to intolerance to zetia and statins and insurance denied the Nexletol 180 mg daily and Repatha every 14 days saying will only cover if cardiology prescribed.    Is trying the Repatha again.     History of low back pain that radiates down the right leg with weakness in the right leg in the past and intermittent numbness.  Had been seen by Dr. Frias 6 years ago and has had EMG with Dr. Gomez showing abnormal study slowing severe peripheral neuropathy with significant axonal loss.  MRI on 10/9/2017 showed degenerative disc disease L4-L5 fusion.  Having difficulty walking secondary to pain and can walk only approximately 50 yards.  [facility] and underwent radiofrequency ablation which did help some.  Has not been seen for 6 years.  MRI was ordered that showed protruding disc at the L3-L4 level with a moderate central canal stenosis and some arthritis with swelling on both sides of the disc causing narrowing of the space that the nerve exits the spine on both sides.  Had spine stimulator placed 4/2023 and has had significant improvement.  Last urine compliance screen on  5/30/24 and continues the lyrica does seem to be helping.     Patient with history of anemia secondary to acute gastritis and acute duodenitis on 8/4/2022.  Currently is off all stomach medicines.  Last CBC on 9/24/2024 demonstrated hemoglobin of 11.7 and hematocrit of 35.4.    History of gout flare on 9/2/24 with pain in the first toe.  Was given colchicine and improved.  Now on allopurinol prescribed by nephrology.  States is better.    The following portions of the patient's history were reviewed and updated as appropriate: allergies, current medications, past family history, past medical history, past social history, past surgical history, and problem list.  Compared to one year ago, the patient's physical health is the same.  Compared to one year ago, the patient's mental health is the same.    Recent Hospitalizations:  He was not admitted to the hospital during the last year.     Current Medical Providers:  Patient Care Team:  Guillermo Moran MD as PCP - General (Internal Medicine)  Edward Starkey MD as Surgeon (Orthopedic Surgery)  Sue Fraga DPM as Consulting Physician (Podiatry)  Adam Cunningham MD as Consulting Physician (Urology)  Irasema Loco MD as Consulting Physician (Pain Medicine)  Korin Reilly MD as Consulting Physician (Gastroenterology)  Gianna Jaramillo as Consulting Physician (Nephrology)  Herbert Palmer MD as Consulting Physician (Nephrology)    Outpatient Medications Prior to Visit   Medication Sig Dispense Refill    allopurinol (ZYLOPRIM) 100 MG tablet Take 1 tablet by mouth Daily.      furosemide (LASIX) 20 MG tablet TAKE 1 TABLET BY MOUTH EVERY  MORNING 90 tablet 3    levocetirizine (XYZAL) 5 MG tablet Take 1 tablet by mouth Every Evening.      lisinopril (PRINIVIL,ZESTRIL) 10 MG tablet TAKE 1 TABLET BY MOUTH IN THE  MORNING 90 tablet 1    Lokelma 5 g pack       Lumigan 0.01 % ophthalmic drops       Tremfya 100 MG/ML solution pen-injector        Evolocumab (Repatha) solution prefilled syringe injection INJECT 140MG SUBCUTANEOUSLY  EVERY 2 WEEKS AS DIRECTED 6 mL 3    pregabalin (LYRICA) 150 MG capsule Take 1 capsule by mouth 2 (Two) Times a Day. 180 capsule 0    colchicine 0.6 MG tablet Take 2 tablets now and 1 tablet in 1 hour.  Due to renal impairment do not repeat within 2 weeks (Patient not taking: Reported on 10/7/2024) 3 tablet 1     No facility-administered medications prior to visit.     No opioid medication identified on active medication list. I have reviewed chart for other potential  high risk medication/s and harmful drug interactions in the elderly.      Aspirin is not on active medication list.  Aspirin use is not indicated based on review of current medical condition/s. Risk of harm outweighs potential benefits.  .    Patient Active Problem List   Diagnosis    Spinal stenosis of lumbar region with neurogenic claudication    Chronic midline low back pain without sciatica    Bilateral sacroiliitis    Hypertension    Hyperlipidemia    Leg cramps    Elevated transaminase level    Medicare annual wellness visit, subsequent    Encounter for hepatitis C screening test for low risk patient    Vitamin D deficiency    Muscle spasm    RLS (restless legs syndrome)    S/P lumbar spinal fusion    S/P shoulder replacement    Idiopathic peripheral neuropathy    Other chronic pain    Osteoarthritis of lumbar spine    Lumbar radiculopathy    Right hip pain    High risk medication use    Primary osteoarthritis of left knee    Primary osteoarthritis of right hip    Acute gastritis    Obesity (BMI 30-39.9)    Acute duodenitis    Status post right knee replacement    Duodenal ulcer without hemorrhage or perforation    NSAIDs adverse effect    Iron deficiency anemia due to chronic blood loss    Degenerative lumbar spinal stenosis    Lumbar post-laminectomy syndrome    Displacement of lumbar intervertebral disc without myelopathy    S/P insertion of spinal cord  "stimulator    Hyperuricemia    Anemia in chronic kidney disease    Stage 3b chronic kidney disease     Advance Care Planning Advance Directive is on file.  ACP discussion was held with the patient during this visit. Patient has an advance directive in EMR which is still valid.       Objective   Vitals:    10/07/24 0838   BP: 110/60   BP Location: Left arm   Patient Position: Sitting   Cuff Size: Large Adult   Pulse: 63   Temp: 97.1 °F (36.2 °C)   TempSrc: Temporal   SpO2: 96%   Weight: 134 kg (296 lb)   Height: 182.9 cm (72\")       Estimated body mass index is 40.14 kg/m² as calculated from the following:    Height as of this encounter: 182.9 cm (72\").    Weight as of this encounter: 134 kg (296 lb).    Class 3 Severe Obesity (BMI >=40). Obesity-related health conditions include the following: hypertension, dyslipidemias, and osteoarthritis. Obesity is worsening. BMI is is above average; BMI management plan is completed. We discussed low calorie, low carb based diet program, portion control, and increasing exercise.     Does the patient have evidence of cognitive impairment? No, mini cog 5 out of 5 score                                                                                              Health  Risk Assessment    Smoking Status:  Social History     Tobacco Use   Smoking Status Former    Current packs/day: 0.00    Average packs/day: 2.0 packs/day for 12.0 years (24.0 ttl pk-yrs)    Types: Cigarettes    Start date: 1964    Quit date: 1976    Years since quittin.8   Smokeless Tobacco Never     Alcohol Consumption:  Social History     Substance and Sexual Activity   Alcohol Use Yes    Comment: SOCIALLY-Drinks 7 or less drinks per week       Fall Risk Screen  STEADI Fall Risk Assessment was completed, and patient is at LOW risk for falls.Assessment completed on:10/7/2024    Depression Screening:      10/7/2024     8:41 AM   PHQ-2/PHQ-9 Depression Screening   Little Interest or Pleasure in Doing " Things 0-->not at all   Feeling Down, Depressed or Hopeless 0-->not at all   PHQ-9: Brief Depression Severity Measure Score 0     Health Habits and Functional and Cognitive Screening:      10/7/2024     8:00 AM   Functional & Cognitive Status   Do you have difficulty preparing food and eating? No   Do you have difficulty bathing yourself, getting dressed or grooming yourself? No   Do you have difficulty using the toilet? No   Do you have difficulty moving around from place to place? No   Do you have trouble with steps or getting out of a bed or a chair? Yes   Current Diet Well Balanced Diet   Dental Exam Up to date   Eye Exam Up to date   Exercise (times per week) 0 times per week   Current Exercises Include No Regular Exercise   Do you need help using the phone?  No   Are you deaf or do you have serious difficulty hearing?  No   Do you need help to go to places out of walking distance? No   Do you need help shopping? No   Do you need help preparing meals?  No   Do you need help with housework?  No   Do you need help with laundry? No   Do you need help taking your medications? No   Do you need help managing money? No   Do you ever drive or ride in a car without wearing a seat belt? No   Have you felt unusual stress, anger or loneliness in the last month? No   Who do you live with? Spouse   If you need help, do you have trouble finding someone available to you? No   Have you been bothered in the last four weeks by sexual problems? No   Do you have difficulty concentrating, remembering or making decisions? No           Age-appropriate Screening Schedule:  Refer to the list below for future screening recommendations based on patient's age, sex and/or medical conditions. Orders for these recommended tests are listed in the plan section. The patient has been provided with a written plan.    Health Maintenance List  Health Maintenance   Topic Date Due    TDAP/TD VACCINES (1 - Tdap) Never done    ZOSTER VACCINE (1 of 2)  09/23/1996    RSV Vaccine - Adults (1 - 1-dose 60+ series) Never done    COVID-19 Vaccine (5 - 2023-24 season) 10/07/2025 (Originally 9/1/2024)    ANNUAL WELLNESS VISIT  10/07/2025    BMI FOLLOWUP  10/07/2025    COLORECTAL CANCER SCREENING  08/15/2026    HEPATITIS C SCREENING  Completed    INFLUENZA VACCINE  Completed    Pneumococcal Vaccine 65+  Completed    LIPID PANEL  Discontinued    LUNG CANCER SCREENING  Discontinued                                                                                                                                                CMS Preventative Services Quick Reference  Risk Factors Identified During Encounter  Immunizations Discussed/Encouraged: Tdap, Influenza, Shingrix, COVID19, and RSV (Respiratory Syncytial Virus)  Diagnoses and all orders for this visit:    1. Medicare annual wellness visit, subsequent (Primary)    2. Idiopathic peripheral neuropathy  -     pregabalin (LYRICA) 150 MG capsule; Take 1 capsule by mouth 2 (Two) Times a Day.  Dispense: 180 capsule; Refill: 1    3. Hyperlipidemia, unspecified hyperlipidemia type  Assessment & Plan:       Orders:  -     Evolocumab (Repatha) solution prefilled syringe injection; INJECT 140MG SUBCUTANEOUSLY  EVERY 2 WEEKS AS DIRECTED  Dispense: 6 mL; Refill: 3    4. Stage 3b chronic kidney disease  Assessment & Plan:        5. Immunization due  -     Fluzone High-Dose 65+yrs ([id number])      Annual wellness visit reviewed with patient.  All past history, medications, social history, and problem list were reviewed.  Discussed advanced directives and living will.  Patient has living will: Living will: Directive already scanned in chart.  Discussed fall risk and precautions encourage removing throw rugs and using grab bars within the home and bathroom.  Will check the labs as ordered above to evaluate the blood sugars, kidney, liver, cholesterol for screening.  Discussed flu shot recommended to get the high-dose influenza vaccine  annually in the fall.    Prevnar-13 and pneumovax-23 up to date and appropriate.  RSV, Tdap, Covid booster, and Shingrix vaccination series recommended.  Patient declines the covid immunization but agrees to the flu shot today.  All other vaccinations at the pharmacy.  Encourage follow-up with the eye doctor on annual basis for glaucoma evaluation.  Discussed weight and encouraged exercise as tolerated while following a healthy diet.  Colon cancer screening discussed and current status: colonoscopy last completed 8/15/2016 with repeat after 10 years recommended.  Discussed prostate screening for which he is currently seeing urology.  Follow up with current specialists as needed.    Hypertension is currently well-controlled.  Continue current medication okay.  Follow-up with his nephrologist and other specialist as scheduled.    The above risks/problems have been discussed with the patient.  Pertinent information has been shared with the patient in the After Visit Summary.  An After Visit Summary and PPPS were made available to the patient.    Follow Up:   Next Medicare Wellness visit to be scheduled in 1 year.

## 2024-11-11 RX ORDER — FUROSEMIDE 20 MG/1
20 TABLET ORAL EVERY MORNING
Qty: 90 TABLET | Refills: 3 | Status: SHIPPED | OUTPATIENT
Start: 2024-11-11

## 2025-01-16 DIAGNOSIS — I10 ESSENTIAL HYPERTENSION: ICD-10-CM

## 2025-01-16 DIAGNOSIS — I10 PRIMARY HYPERTENSION: ICD-10-CM

## 2025-01-16 RX ORDER — LISINOPRIL 10 MG/1
10 TABLET ORAL EVERY MORNING
Qty: 90 TABLET | Refills: 3 | Status: SHIPPED | OUTPATIENT
Start: 2025-01-16

## 2025-01-16 NOTE — TELEPHONE ENCOUNTER
LOV                   10/7/2024  NOV                   4/28/2025  Last refill             7/12/24  Protocol              met

## 2025-04-10 DIAGNOSIS — G60.9 IDIOPATHIC PERIPHERAL NEUROPATHY: ICD-10-CM

## 2025-04-10 RX ORDER — PREGABALIN 150 MG/1
150 CAPSULE ORAL 2 TIMES DAILY
Qty: 180 CAPSULE | Refills: 1 | Status: SHIPPED | OUTPATIENT
Start: 2025-04-10

## 2025-04-10 NOTE — TELEPHONE ENCOUNTER
PATIENT CALLED FOR MEDICATION REFILL OF    pregabalin (LYRICA) 150 MG capsule   HE HAS ABOUT A WEEK AND HALF LEFT    [facility] [address] - [phone] [place] [phone]  [phone]     HE IS IN [place] WILL BE BACK AT END OF MONTH AND REQUESTING A 30 DAY REFILL     CALL BACK NUMBER [phone]    NEXT VISIT 4/28/25

## 2025-04-10 NOTE — TELEPHONE ENCOUNTER
LOV                   10/7/2024  NOV                   4/28/2025  Last refill           10/7/24    Protocol           not met

## 2025-04-28 ENCOUNTER — OFFICE VISIT (OUTPATIENT)
Dept: FAMILY MEDICINE CLINIC | Facility: CLINIC | Age: 80
End: 2025-04-28
Payer: MEDICARE

## 2025-04-28 VITALS
DIASTOLIC BLOOD PRESSURE: 70 MMHG | HEIGHT: 72 IN | WEIGHT: 276.4 LBS | TEMPERATURE: 97.8 F | SYSTOLIC BLOOD PRESSURE: 110 MMHG | BODY MASS INDEX: 37.44 KG/M2 | OXYGEN SATURATION: 97 % | HEART RATE: 71 BPM

## 2025-04-28 DIAGNOSIS — G60.9 IDIOPATHIC PERIPHERAL NEUROPATHY: ICD-10-CM

## 2025-04-28 DIAGNOSIS — E78.5 HYPERLIPIDEMIA, UNSPECIFIED HYPERLIPIDEMIA TYPE: ICD-10-CM

## 2025-04-28 DIAGNOSIS — N48.1 BALANITIS: ICD-10-CM

## 2025-04-28 DIAGNOSIS — I10 PRIMARY HYPERTENSION: Primary | ICD-10-CM

## 2025-04-28 DIAGNOSIS — N18.32 STAGE 3B CHRONIC KIDNEY DISEASE: ICD-10-CM

## 2025-04-28 DIAGNOSIS — Z79.899 MEDICATION MANAGEMENT: ICD-10-CM

## 2025-04-28 RX ORDER — NYSTATIN 100000 U/G
1 CREAM TOPICAL 2 TIMES DAILY
Qty: 30 G | Refills: 0 | Status: SHIPPED | OUTPATIENT
Start: 2025-04-28

## 2025-04-28 RX ORDER — NYSTATIN TOPICAL POWDER 100000 U/G
POWDER TOPICAL
COMMUNITY
Start: 2025-01-29 | End: 2025-04-28

## 2025-04-28 RX ORDER — FERROUS SULFATE 325(65) MG
1 TABLET ORAL
COMMUNITY
Start: 2025-03-04

## 2025-04-28 NOTE — PROGRESS NOTES
Subjective   Isaac Suarez is a 79 y.o. male.     Chief Complaint   Patient presents with    Hypertension     F/u       History of Present Illness   Wife was present during the history-taking and subsequent discussion (and for part of the physical exam) with this patient.  Patient agrees to the presence of the individual during this visit.    Has had a few episodes of a sharp pain for just a few seconds that goes in the right hip fold.  Not related to movement or action.  No urine or stool issues.    Follow-up for hypertension.  Currently, has been feeling well and asymptomatic without any headaches, vision changes, cough, chest pain, shortness of breath, swelling, focal neurologic deficit, memory loss or syncope.  Has been taking the medications regularly and adherent with the regimen of furosemide 20 mg in the morning and lisinopril 10 mg daily.  Denies medication side effects and no significant interval events.       Follow-up for cholesterol.  Currently, has been feeling well without any myalgias, muscle aches, weakness, numbness, chest pain, short of breath or other issues.  Currently, is not on any regimen secondary to intolerance to zetia and statins and insurance denied the Repatha every 14 days saying will only cover if cardiology prescribed.    Is trying the Repatha again.     History of low back pain that radiates down the right leg with weakness in the right leg in the past and intermittent numbness.  Had been seen by Dr. Frias 6 years ago and has had EMG with Dr. Gomez showing abnormal study slowing severe peripheral neuropathy with significant axonal loss.  MRI on 10/9/2017 showed degenerative disc disease L4-L5 fusion.  Having difficulty walking secondary to pain and can walk only approximately 50 yards.  Was seen by pain management at [facility] pain management in distant past and underwent radiofrequency ablation which did help some.  Has not been seen for 6 years.  MRI was ordered that showed  protruding disc at the L3-L4 level with a moderate central canal stenosis and some arthritis with swelling on both sides of the disc causing narrowing of the space that the nerve exits the spine on both sides.  Had spine stimulator placed 4/2023 and has had significant improvement.  Last urine compliance screen on 5/30/24 and continues the lyrica 150 mg twice a day does seem to be helping.     Patient with history of anemia secondary to acute gastritis and acute duodenitis on 8/4/2022.  Currently is off all stomach medicines.  Last CBC on 9/24/2024 demonstrated hemoglobin of 11.7 and hematocrit of 35.4.     History of gout flare last on 9/2/24 with pain in the first toe.  Was given colchicine and improved.  Now on allopurinol 100 mg daily prescribed by nephrology.  States is better.       The following portions of the patient's history were reviewed and updated as appropriate: allergies, current medications, past family history, past medical history, past social history, past surgical history and problem list.    Depression Screen:      10/7/2024     8:41 AM   PHQ-2/PHQ-9 Depression Screening   Retired Little Interest or Pleasure in Doing Things 0-->not at all    Retired Feeling Down, Depressed or Hopeless 0-->not at all    Retired PHQ-9: Brief Depression Severity Measure Score 0        Data saved with a previous flowsheet row definition       Past Medical History:   Diagnosis Date    Abnormal MRI, lumbar spine     Allergic rhinitis     Arthritis     Bilateral hearing loss     Borderline blood pressure     BPH (benign prostatic hyperplasia)     Cancer     PROSTATE    Chronic back pain     when walking and lying down    Chronic kidney disease (CKD), stage I     Cough     Edema     Elevated transaminase level     Erectile dysfunction     External hemorrhoids     GERD (gastroesophageal reflux disease)     High cholesterol     History of colon polyps     History of depression     History of prostate cancer     RADIATION  [place]    Hyperlipidemia     Hypertension     Iron deficiency anemia due to chronic blood loss 09/15/2022    Left knee pain     Leg cramps     Low back pain     Lumbar stenosis     Muscle spasm     Prostate cancer     Renal cyst     RLS (restless legs syndrome)     Statin intolerance     Ulcer     Ulcer, stomach peptic, chronic     Vitamin D deficiency        Past Surgical History:   Procedure Laterality Date    CATARACT EXTRACTION Right     COLONOSCOPY N/A 08/15/2016    Procedure: COLONOSCOPY INTO CECUM WITH COLD SNARE POLYPECTOMY;  Surgeon: Loc Lee MD;  Location: [facility] ENDOSCOPY;  Service:     ENDOSCOPY N/A 08/04/2022    Procedure: ESOPHAGOGASTRODUODENOSCOPY  WITH BIOPSIES;  Surgeon: Korin Reilly MD;  Location: [facility] ENDOSCOPY;  Service: Gastroenterology;  Laterality: N/A;  pre: abnormal imaging, anemia  post: erosive gastritis, duodenal ulcer    HERNIA REPAIR      bilat inguinal    INSERTION PROSTATE RADIATION SEED      JOINT REPLACEMENT Left 01/05/2022    left knee replacement    LUMBAR DISCECTOMY FUSION INSTRUMENTATION N/A 09/23/2016    Procedure: L 4-5 LUMBAR DISCECTOMY FUSION WITH INSTRUMENTATION;  Surgeon: Michael Frias MD;  Location: [facility] MAIN OR;  Service:     LUMBAR EPIDURAL INJECTION N/A 08/06/2021    Procedure: LUMBAR EPIDURAL 1ST VISIT 3-4;  Surgeon: Irasema Loco MD;  Location: [facility] MAIN OR;  Service: Pain Management;  Laterality: N/A;    LUMBAR EPIDURAL INJECTION N/A 09/10/2021    Procedure: lumbar epidural steroid injection lumbar2-3;  Surgeon: Irasema Loco MD;  Location: [facility] MAIN OR;  Service: Pain Management;  Laterality: N/A;    PROSTATE BIOPSY      Needle biopsy    REPLACEMENT TOTAL KNEE Right 07/26/2022    Dr. Jose G Belle    ROTATOR CUFF REPAIR Bilateral     metal in both    SACROILIAC JOINT INJECTION Left 12/08/2021    Procedure: SACROILIAC INJECTION-- left;  Surgeon: Irasema Loco MD;  Location: [facility] MAIN OR;  Service: Pain Management;  Laterality: Left;     SACROILIAC JOINT INJECTION Left 2022    Procedure: SACROILIAC INJECTION--left;  Surgeon: Irasema Loco MD;  Location: [facility] MAIN OR;  Service: Pain Management;  Laterality: Left;    SPINAL CORD STIMULATOR IMPLANT  2024    NERVO-HFX SENSA spinal cord stimulator    TOTAL SHOULDER ARTHROPLASTY Bilateral     VASECTOMY         Family History   Problem Relation Age of Onset    Cancer Father         BRAIN    Esophageal cancer Other        Social History     Socioeconomic History    Marital status:     Years of education: 12TH GRADE   Tobacco Use    Smoking status: Former     Current packs/day: 0.00     Average packs/day: 2.0 packs/day for 12.0 years (24.0 ttl pk-yrs)     Types: Cigarettes     Start date: 1964     Quit date: 1976     Years since quittin.3     Passive exposure: Past    Smokeless tobacco: Never   Vaping Use    Vaping status: Never Used   Substance and Sexual Activity    Alcohol use: Yes     Comment: SOCIALLY-Drinks 7 or less drinks per week    Drug use: No    Sexual activity: Yes     Partners: Female     Birth control/protection: None       Current Outpatient Medications   Medication Sig Dispense Refill    allopurinol (ZYLOPRIM) 100 MG tablet Take 1 tablet by mouth Daily.      Evolocumab (Repatha) solution prefilled syringe injection INJECT 140MG SUBCUTANEOUSLY  EVERY 2 WEEKS AS DIRECTED 6 mL 3    FeroSul 325 (65 Fe) MG tablet Take 1 tablet by mouth Every Other Day.      furosemide (LASIX) 20 MG tablet TAKE 1 TABLET BY MOUTH IN THE  MORNING 90 tablet 3    levocetirizine (XYZAL) 5 MG tablet Take 1 tablet by mouth Every Evening.      lisinopril (PRINIVIL,ZESTRIL) 10 MG tablet TAKE 1 TABLET BY MOUTH IN THE  MORNING 90 tablet 3    Lokelma 5 g pack       Lumigan 0.01 % ophthalmic drops       pregabalin (LYRICA) 150 MG capsule Take 1 capsule by mouth 2 (Two) Times a Day. 180 capsule 1    Tremfya 100 MG/ML solution pen-injector       colchicine 0.6 MG tablet Take 2 tablets now and 1  "tablet in 1 hour.  Due to renal impairment do not repeat within 2 weeks (Patient not taking: Reported on 4/28/2025) 3 tablet 1    nystatin (MYCOSTATIN) 863068 UNIT/GM cream Apply 1 Application topically to the appropriate area as directed 2 (Two) Times a Day. 30 g 0     No current facility-administered medications for this visit.       Review of Systems   Constitutional:  Negative for activity change, appetite change, fatigue, fever, unexpected weight gain and unexpected weight loss.   HENT:  Negative for nosebleeds, rhinorrhea, trouble swallowing and voice change.    Eyes:  Negative for visual disturbance.   Respiratory:  Negative for cough, chest tightness, shortness of breath and wheezing.    Cardiovascular:  Negative for chest pain, palpitations and leg swelling.   Gastrointestinal:  Negative for abdominal pain, blood in stool, constipation, diarrhea, nausea, vomiting, GERD and indigestion.   Genitourinary:  Negative for dysuria, frequency and hematuria.   Musculoskeletal:  Negative for arthralgias, back pain and myalgias.   Skin:  Negative for rash and wound.   Neurological:  Negative for dizziness, tremors, weakness, light-headedness, numbness, headache and memory problem.   Hematological:  Negative for adenopathy. Does not bruise/bleed easily.   Psychiatric/Behavioral:  Negative for sleep disturbance and depressed mood. The patient is not nervous/anxious.        Objective   /70 (BP Location: Left arm, Patient Position: Sitting, Cuff Size: Large Adult)   Pulse 71   Temp 97.8 °F (36.6 °C) (Temporal)   Ht 182.9 cm (72.01\")   Wt 125 kg (276 lb 6.4 oz)   SpO2 97%   BMI 37.48 kg/m²     Physical Exam  Vitals and nursing note reviewed.   Constitutional:       General: He is not in acute distress.     Appearance: He is well-developed. He is obese. He is not diaphoretic.   HENT:      Head: Normocephalic and atraumatic.      Right Ear: External ear normal.      Left Ear: External ear normal.      Nose: Nose " normal.   Eyes:      Conjunctiva/sclera: Conjunctivae normal.      Pupils: Pupils are equal, round, and reactive to light.   Neck:      Thyroid: No thyromegaly.      Trachea: No tracheal deviation.   Cardiovascular:      Rate and Rhythm: Normal rate and regular rhythm.      Heart sounds: Normal heart sounds. No murmur heard.     No friction rub. No gallop.   Pulmonary:      Effort: Pulmonary effort is normal. No respiratory distress.      Breath sounds: Normal breath sounds. No wheezing or rales.   Abdominal:      General: Bowel sounds are normal. There is no distension.      Palpations: Abdomen is soft. There is no mass.      Tenderness: There is no abdominal tenderness. There is no guarding or rebound.      Hernia: No hernia is present.   Musculoskeletal:         General: Normal range of motion.      Cervical back: Normal range of motion and neck supple.   Lymphadenopathy:      Cervical: No cervical adenopathy.   Skin:     General: Skin is warm and dry.      Capillary Refill: Capillary refill takes less than 2 seconds.      Findings: No rash.   Neurological:      Mental Status: He is alert and oriented to person, place, and time.      Motor: No abnormal muscle tone.      Deep Tendon Reflexes: Reflexes normal.   Psychiatric:         Behavior: Behavior normal.         Thought Content: Thought content normal.         Judgment: Judgment normal.         No results found for this or any previous visit (from the past 12 weeks).  Assessment & Plan   Diagnoses and all orders for this visit:    1. Primary hypertension (Primary)  -     Comprehensive Metabolic Panel; Future  -     Lipid Panel; Future    2. Hyperlipidemia, unspecified hyperlipidemia type  -     Comprehensive Metabolic Panel; Future  -     Lipid Panel; Future    3. Stage 3b chronic kidney disease  -     Comprehensive Metabolic Panel; Future    4. Idiopathic peripheral neuropathy  -     ToxASSURE Select 13 (MW) - Urine, Clean Catch    5. Medication management  -      ToxASSURE Select 13 (MW) - Urine, Clean Catch    6. Balanitis  -     nystatin (MYCOSTATIN) [id number] UNIT/GM cream; Apply 1 Application topically to the appropriate area as directed 2 (Two) Times a Day.  Dispense: 30 g; Refill: 0      Continue the current medications and follow up with the nephrologist.  Discussed the medications and the lyrica.  NEHA run and reviewed.  Risks of the medication include but are not limited to fatigue, somnolence, increased risk of falls, constipation, allergic reaction, dependence, and addiction.  New controlled substance agreement reviewed and signed today.  To have labs done with nephrology and order given to patient.         Dictated utilizing Dragon Dictation

## 2025-05-07 LAB — DRUGS UR: NORMAL

## 2025-05-20 LAB
ALBUMIN SERPL-MCNC: 4.5 G/DL (ref 3.8–4.8)
ALP SERPL-CCNC: 65 IU/L (ref 44–121)
ALT SERPL-CCNC: 54 IU/L (ref 0–44)
AMBIG ABBREV BMP8 DEFAULT: NORMAL
AMBIG ABBREV LP DEFAULT: NORMAL
AST SERPL-CCNC: 47 IU/L (ref 0–40)
BILIRUB SERPL-MCNC: 0.5 MG/DL (ref 0–1.2)
BUN SERPL-MCNC: 33 MG/DL (ref 8–27)
BUN/CREAT SERPL: 19 (ref 10–24)
CALCIUM SERPL-MCNC: 10.2 MG/DL (ref 8.6–10.2)
CHLORIDE SERPL-SCNC: 102 MMOL/L (ref 96–106)
CHOLEST SERPL-MCNC: 184 MG/DL (ref 100–199)
CO2 SERPL-SCNC: 21 MMOL/L (ref 20–29)
CREAT SERPL-MCNC: 1.7 MG/DL (ref 0.76–1.27)
EGFRCR SERPLBLD CKD-EPI 2021: 41 ML/MIN/1.73
GLOBULIN SER CALC-MCNC: 2.1 G/DL (ref 1.5–4.5)
GLUCOSE SERPL-MCNC: 113 MG/DL (ref 70–99)
HDLC SERPL-MCNC: 45 MG/DL
LDLC SERPL CALC-MCNC: 103 MG/DL (ref 0–99)
POTASSIUM SERPL-SCNC: 4.5 MMOL/L (ref 3.5–5.2)
PROT SERPL-MCNC: 6.6 G/DL (ref 6–8.5)
SODIUM SERPL-SCNC: 137 MMOL/L (ref 134–144)
TRIGL SERPL-MCNC: 211 MG/DL (ref 0–149)
VLDLC SERPL CALC-MCNC: 36 MG/DL (ref 5–40)

## 2025-07-05 DIAGNOSIS — E78.5 HYPERLIPIDEMIA, UNSPECIFIED HYPERLIPIDEMIA TYPE: ICD-10-CM

## 2025-07-07 RX ORDER — EVOLOCUMAB 140 MG/ML
INJECTION, SOLUTION SUBCUTANEOUS
Qty: 6 ML | Refills: 3 | Status: SHIPPED | OUTPATIENT
Start: 2025-07-07

## 2025-08-19 RX ORDER — FUROSEMIDE 20 MG/1
20 TABLET ORAL EVERY MORNING
Qty: 90 TABLET | Refills: 3 | OUTPATIENT
Start: 2025-08-19

## 2025-08-26 RX ORDER — FUROSEMIDE 20 MG/1
20 TABLET ORAL EVERY MORNING
Qty: 90 TABLET | Refills: 0 | Status: SHIPPED | OUTPATIENT
Start: 2025-08-26

## (undated) DEVICE — KT ORCA ORCAPOD DISP STRL

## (undated) DEVICE — ADAPT CLN BIOGUARD AIR/H2O DISP

## (undated) DEVICE — FRCP BX RADJAW4 NDL 2.8 240CM LG OG BX40

## (undated) DEVICE — GLV SURG TRIUMPH PF LTX 7 STRL

## (undated) DEVICE — LN SMPL CO2 SHTRM SD STREAM W/M LUER

## (undated) DEVICE — NDL EPID TUOHY 18G 31/2IN

## (undated) DEVICE — NEEDLE, QUINCKE 22GX3.5": Brand: MEDLINE INDUSTRIES, INC.

## (undated) DEVICE — TUBING, SUCTION, 1/4" X 10', STRAIGHT: Brand: MEDLINE

## (undated) DEVICE — NDL SPINE 22G 5IN BLK

## (undated) DEVICE — EPIDURAL TRAY: Brand: MEDLINE INDUSTRIES, INC.

## (undated) DEVICE — NDL EPID PERIFIX TUOHY 18G 6IN

## (undated) DEVICE — GLV SURG TRIUMPH PF LTX 7.5 STRL

## (undated) DEVICE — BITEBLOCK OMNI BLOC

## (undated) DEVICE — APPL CHLORAPREP 3ML CLR

## (undated) DEVICE — Device: Brand: PORTEX

## (undated) DEVICE — SENSR O2 OXIMAX FNGR A/ 18IN NONSTR

## (undated) DEVICE — MSK ENDO PORT O2 POM ELITE CURAPLEX A/